# Patient Record
Sex: FEMALE | Race: WHITE | NOT HISPANIC OR LATINO | Employment: OTHER | URBAN - METROPOLITAN AREA
[De-identification: names, ages, dates, MRNs, and addresses within clinical notes are randomized per-mention and may not be internally consistent; named-entity substitution may affect disease eponyms.]

---

## 2019-08-01 ENCOUNTER — PATIENT OUTREACH (OUTPATIENT)
Dept: ADMINISTRATIVE | Facility: HOSPITAL | Age: 79
End: 2019-08-01

## 2019-08-01 NOTE — LETTER
August 1, 2019    Christa Tate  [address]  [address]  [place] [address]             [facility]  [address]  [facility] [address]  Phone: [phone] Dear Mrs. Tate:    [facility] is committed to your overall health.  To help you get the most out of each of your visits, we will review your information to make sure you are up to date on all of your recommended tests and/or procedures.      Primary Doctor No  has found that your chart shows you may be due for the following:    Health Maintenance Due   Topic    DEXA SCAN     Pneumococcal Vaccine (65+ Low/Medium Risk) (1 of 2 - PCV13)       If you have had any of the above done at another facility, please bring the records with you or FAX them to [phone] so that your record at [facility] will be complete.  If you have not had any of these tests or procedures done recently and would like to complete this testing, please call [phone] or send a message through your [facility] portal to your provider's office.    If you have an upcoming scheduled appointment for the above test and/or procedures, please disregard this letter.      If you have any questions or concerns, please don't hesitate to call.    Sincerely,        Radha Palacios MA

## 2019-08-30 ENCOUNTER — HOSPITAL ENCOUNTER (OUTPATIENT)
Dept: RADIOLOGY | Facility: HOSPITAL | Age: 79
Discharge: HOME | End: 2019-08-30
Attending: FAMILY MEDICINE
Payer: MEDICARE

## 2019-08-30 ENCOUNTER — OFFICE VISIT (OUTPATIENT)
Dept: FAMILY MEDICINE | Facility: CLINIC | Age: 79
End: 2019-08-30
Payer: MEDICARE

## 2019-08-30 VITALS
TEMPERATURE: 98 F | OXYGEN SATURATION: 97 % | SYSTOLIC BLOOD PRESSURE: 120 MMHG | HEART RATE: 65 BPM | BODY MASS INDEX: 30.69 KG/M2 | WEIGHT: 173.19 LBS | HEIGHT: 63 IN | DIASTOLIC BLOOD PRESSURE: 70 MMHG

## 2019-08-30 DIAGNOSIS — M54.50 LOW BACK PAIN, NON-SPECIFIC: ICD-10-CM

## 2019-08-30 DIAGNOSIS — R30.0 DYSURIA: Primary | ICD-10-CM

## 2019-08-30 DIAGNOSIS — J32.1 CHRONIC FRONTAL SINUSITIS: ICD-10-CM

## 2019-08-30 DIAGNOSIS — N30.00 ACUTE CYSTITIS WITHOUT HEMATURIA: ICD-10-CM

## 2019-08-30 DIAGNOSIS — K59.09 CHRONIC CONSTIPATION: ICD-10-CM

## 2019-08-30 LAB
BILIRUB SERPL-MCNC: NEGATIVE MG/DL
BLOOD URINE, POC: NEGATIVE
COLOR, POC UA: ABNORMAL
GLUCOSE UR QL STRIP: NORMAL
KETONES UR QL STRIP: NEGATIVE
LEUKOCYTE ESTERASE URINE, POC: ABNORMAL
NITRITE, POC UA: NEGATIVE
PH, POC UA: 7
PROTEIN, POC: NEGATIVE
SPECIFIC GRAVITY, POC UA: 1
UROBILINOGEN, POC UA: NORMAL

## 2019-08-30 PROCEDURE — 99999 PR PBB SHADOW E&M-EST. PATIENT-LVL III: CPT | Mod: PBBFAC,,, | Performed by: FAMILY MEDICINE

## 2019-08-30 PROCEDURE — 72110 XR LUMBAR SPINE COMPLETE 5 VIEW: ICD-10-PCS | Mod: 26,,, | Performed by: RADIOLOGY

## 2019-08-30 PROCEDURE — 99999 PR PBB SHADOW E&M-EST. PATIENT-LVL III: ICD-10-PCS | Mod: PBBFAC,,, | Performed by: FAMILY MEDICINE

## 2019-08-30 PROCEDURE — 99204 PR OFFICE/OUTPT VISIT, NEW, LEVL IV, 45-59 MIN: ICD-10-PCS | Mod: S$PBB,,, | Performed by: FAMILY MEDICINE

## 2019-08-30 PROCEDURE — 81002 URINALYSIS NONAUTO W/O SCOPE: CPT | Mod: PBBFAC,PN | Performed by: FAMILY MEDICINE

## 2019-08-30 PROCEDURE — 72110 X-RAY EXAM L-2 SPINE 4/>VWS: CPT | Mod: TC,PN

## 2019-08-30 PROCEDURE — 99213 OFFICE O/P EST LOW 20 MIN: CPT | Mod: PBBFAC,25,PN | Performed by: FAMILY MEDICINE

## 2019-08-30 PROCEDURE — 99204 OFFICE O/P NEW MOD 45 MIN: CPT | Mod: S$PBB,,, | Performed by: FAMILY MEDICINE

## 2019-08-30 PROCEDURE — 72110 X-RAY EXAM L-2 SPINE 4/>VWS: CPT | Mod: 26,,, | Performed by: RADIOLOGY

## 2019-08-30 RX ORDER — FLUTICASONE PROPIONATE 50 MCG
2 SPRAY, SUSPENSION (ML) NASAL DAILY
Qty: 1 BOTTLE | Refills: 5 | Status: SHIPPED | OUTPATIENT
Start: 2019-08-30 | End: 2020-07-09

## 2019-08-30 RX ORDER — CEPHALEXIN 500 MG/1
500 CAPSULE ORAL EVERY 12 HOURS
Qty: 14 CAPSULE | Refills: 0 | Status: SHIPPED | OUTPATIENT
Start: 2019-08-30 | End: 2020-03-06

## 2019-08-30 RX ORDER — LUBIPROSTONE 24 UG/1
24 CAPSULE ORAL 2 TIMES DAILY WITH MEALS
Qty: 60 CAPSULE | Refills: 11 | Status: SHIPPED | OUTPATIENT
Start: 2019-08-30 | End: 2020-05-26

## 2019-08-30 RX ORDER — LATANOPROST 50 UG/ML
SOLUTION/ DROPS OPHTHALMIC
Refills: 6 | COMMUNITY
Start: 2019-07-31 | End: 2020-10-22

## 2019-08-30 NOTE — PROGRESS NOTES
Assessment and Plan:    1. Dysuria  - POCT urine dipstick without microscope  - Urine culture; Future    2. Low back pain, non-specific  - X-Ray Lumbar Spine Complete 5 View; Future    3. Chronic constipation  - lubiprostone (AMITIZA) 24 MCG Cap; Take 1 capsule (24 mcg total) by mouth 2 (two) times daily with meals.  Dispense: 60 capsule; Refill: 11    4. Acute cystitis without hematuria  - cephALEXin (KEFLEX) 500 MG capsule; Take 1 capsule (500 mg total) by mouth every 12 (twelve) hours.  Dispense: 14 capsule; Refill: 0    5. Chronic frontal sinusitis  - fluticasone propionate (FLONASE) 50 mcg/actuation nasal spray; 2 sprays (100 mcg total) by Each Nostril route once daily.  Dispense: 1 Bottle; Refill: 5          ______________________________________________________________________  Subjective:    Chief Complaint:  Chief Complaint   Patient presents with    Establish Care    Urinary Tract Infection     ocurring x2 weeks, patient is having lower back pain on both sides, worse on the left side, and pain/burning upon urination        HPI:  Christa is a 79 y.o. year old     Lower back pain  Associated with lower urinary tract symptoms including dysuria.  Denies fever, nausea, diarrhea. Reports having had fall about two weeks ago and that her lower back has been hurting since then. Was seen in ED, lower back not imaged.     Chronic Sinusitis  Has post nasal drips and sinus HA. Not taking medication at this time for symptoms.     Chronic Constipation   Has tried OTC medicaiton including senna and miralax w/o relief.     Medications:  Current Outpatient Medications on File Prior to Visit   Medication Sig Dispense Refill    alprazolam (XANAX) 0.5 MG tablet Take 1 tablet (0.5 mg total) by mouth 2 (two) times daily as needed for Anxiety. 60 tablet 0    latanoprost 0.005 % ophthalmic solution INT 1 GTT INTO OU ONCE A DAY  6     No current facility-administered medications on file prior to visit.        Review of  "Systems:  Review of Systems   Constitutional: Negative for fever.   Respiratory: Negative for cough and shortness of breath.    Cardiovascular: Negative for chest pain.   Gastrointestinal: Positive for constipation. Negative for abdominal pain, diarrhea, nausea and vomiting.   Genitourinary: Positive for dysuria.   Musculoskeletal: Positive for back pain.   Skin: Negative for rash.   Psychiatric/Behavioral: Negative for dysphoric mood.       Past Medical History:  Past Medical History:   Diagnosis Date    Cataract     OU       Objective:    Vitals:  Vitals:    08/30/19 0901   BP: 120/70   Pulse: 65   Temp: 98.4 °F (36.9 °C)   TempSrc: Oral   SpO2: 97%   Weight: 78.5 kg (173 lb 2.7 oz)   Height: 5' 3" (1.6 m)   PainSc:   7   PainLoc: Back       Physical Exam   Constitutional: No distress.   HENT:   Head: Normocephalic and atraumatic.   Nose: Mucosal edema and rhinorrhea present.   Eyes: Pupils are equal, round, and reactive to light. EOM are normal.   Neck: Neck supple.   Cardiovascular: Normal rate and regular rhythm. Exam reveals no friction rub.   No murmur heard.  Pulmonary/Chest: Effort normal and breath sounds normal.   Abdominal: Soft. Bowel sounds are normal. She exhibits no distension. There is no tenderness. There is no CVA tenderness.   Musculoskeletal:        Back:    Skin: Skin is warm and dry. No rash noted.   Psychiatric: She has a normal mood and affect. Her behavior is normal.           Jonah Stone MD  Family Medicine  "

## 2019-09-03 ENCOUNTER — TELEPHONE (OUTPATIENT)
Dept: FAMILY MEDICINE | Facility: CLINIC | Age: 79
End: 2019-09-03

## 2019-09-03 NOTE — TELEPHONE ENCOUNTER
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 The fall likely aggravated that. .   Dr Stone

## 2019-09-13 ENCOUNTER — PATIENT OUTREACH (OUTPATIENT)
Dept: ADMINISTRATIVE | Facility: HOSPITAL | Age: 79
End: 2019-09-13

## 2019-09-13 DIAGNOSIS — M94.9 DISORDER OF BONE AND ARTICULAR CARTILAGE: Primary | ICD-10-CM

## 2019-09-13 DIAGNOSIS — M89.9 DISORDER OF BONE AND ARTICULAR CARTILAGE: Primary | ICD-10-CM

## 2019-10-28 ENCOUNTER — TELEPHONE (OUTPATIENT)
Dept: OPTOMETRY | Facility: CLINIC | Age: 79
End: 2019-10-28

## 2019-10-28 NOTE — TELEPHONE ENCOUNTER
Pt scheduled with Dr. Colón for 2nd opinion on cataracts.  If pt has already seen an outside optometrist, then appointment should be rescheduled to see Dr. Klein instead of Dr. Colón.   L/M for pt to call back to discuss options / appointment.

## 2020-03-06 ENCOUNTER — OFFICE VISIT (OUTPATIENT)
Dept: FAMILY MEDICINE | Facility: CLINIC | Age: 80
End: 2020-03-06
Payer: MEDICARE

## 2020-03-06 VITALS
OXYGEN SATURATION: 97 % | WEIGHT: 173.63 LBS | BODY MASS INDEX: 30.77 KG/M2 | SYSTOLIC BLOOD PRESSURE: 138 MMHG | HEART RATE: 99 BPM | TEMPERATURE: 99 F | DIASTOLIC BLOOD PRESSURE: 74 MMHG | HEIGHT: 63 IN

## 2020-03-06 DIAGNOSIS — R50.9 LOW GRADE FEVER: ICD-10-CM

## 2020-03-06 DIAGNOSIS — B96.89 ACUTE BACTERIAL BRONCHITIS: ICD-10-CM

## 2020-03-06 DIAGNOSIS — B96.89 ACUTE BACTERIAL SINUSITIS: ICD-10-CM

## 2020-03-06 DIAGNOSIS — G93.31 POST-INFLUENZA SYNDROME: ICD-10-CM

## 2020-03-06 DIAGNOSIS — B96.89 ACUTE BACTERIAL PHARYNGITIS: Primary | ICD-10-CM

## 2020-03-06 DIAGNOSIS — Z20.828 EXPOSURE TO INFLUENZA: ICD-10-CM

## 2020-03-06 DIAGNOSIS — J30.2 SEASONAL ALLERGIES: ICD-10-CM

## 2020-03-06 DIAGNOSIS — J20.8 ACUTE BACTERIAL BRONCHITIS: ICD-10-CM

## 2020-03-06 DIAGNOSIS — J02.8 ACUTE BACTERIAL PHARYNGITIS: Primary | ICD-10-CM

## 2020-03-06 DIAGNOSIS — J01.90 ACUTE BACTERIAL SINUSITIS: ICD-10-CM

## 2020-03-06 DIAGNOSIS — J98.01 BRONCHOSPASM: ICD-10-CM

## 2020-03-06 LAB
CTP QC/QA: YES
CTP QC/QA: YES
POC MOLECULAR INFLUENZA A AGN: NEGATIVE
POC MOLECULAR INFLUENZA B AGN: NEGATIVE
S PYO RRNA THROAT QL PROBE: NEGATIVE

## 2020-03-06 PROCEDURE — 99214 PR OFFICE/OUTPT VISIT, EST, LEVL IV, 30-39 MIN: ICD-10-PCS | Mod: S$PBB,,, | Performed by: INTERNAL MEDICINE

## 2020-03-06 PROCEDURE — 99999 PR PBB SHADOW E&M-EST. PATIENT-LVL III: CPT | Mod: PBBFAC,,, | Performed by: INTERNAL MEDICINE

## 2020-03-06 PROCEDURE — 99213 OFFICE O/P EST LOW 20 MIN: CPT | Mod: PBBFAC,PN | Performed by: INTERNAL MEDICINE

## 2020-03-06 PROCEDURE — 87081 CULTURE SCREEN ONLY: CPT

## 2020-03-06 PROCEDURE — 87502 INFLUENZA DNA AMP PROBE: CPT | Mod: PBBFAC,PN | Performed by: INTERNAL MEDICINE

## 2020-03-06 PROCEDURE — 96372 THER/PROPH/DIAG INJ SC/IM: CPT | Mod: PBBFAC,PN

## 2020-03-06 PROCEDURE — 87880 STREP A ASSAY W/OPTIC: CPT | Mod: PBBFAC,PN | Performed by: INTERNAL MEDICINE

## 2020-03-06 PROCEDURE — 99999 PR PBB SHADOW E&M-EST. PATIENT-LVL III: ICD-10-PCS | Mod: PBBFAC,,, | Performed by: INTERNAL MEDICINE

## 2020-03-06 PROCEDURE — 99214 OFFICE O/P EST MOD 30 MIN: CPT | Mod: S$PBB,,, | Performed by: INTERNAL MEDICINE

## 2020-03-06 RX ORDER — ALBUTEROL SULFATE 90 UG/1
2 AEROSOL, METERED RESPIRATORY (INHALATION) EVERY 6 HOURS PRN
Qty: 18 G | Refills: 1 | Status: SHIPPED | OUTPATIENT
Start: 2020-03-06 | End: 2020-04-22

## 2020-03-06 RX ORDER — BETAMETHASONE SODIUM PHOSPHATE AND BETAMETHASONE ACETATE 3; 3 MG/ML; MG/ML
6 INJECTION, SUSPENSION INTRA-ARTICULAR; INTRALESIONAL; INTRAMUSCULAR; SOFT TISSUE ONCE
Status: COMPLETED | OUTPATIENT
Start: 2020-03-06 | End: 2020-03-06

## 2020-03-06 RX ORDER — AZITHROMYCIN 250 MG/1
TABLET, FILM COATED ORAL
Qty: 6 TABLET | Refills: 0 | Status: SHIPPED | OUTPATIENT
Start: 2020-03-06 | End: 2020-05-26 | Stop reason: ALTCHOICE

## 2020-03-06 RX ADMIN — BETAMETHASONE ACETATE AND BETAMETHASONE SODIUM PHOSPHATE 6 MG: 3; 3 INJECTION, SUSPENSION INTRA-ARTICULAR; INTRALESIONAL; INTRAMUSCULAR; SOFT TISSUE at 12:03

## 2020-03-06 NOTE — PATIENT INSTRUCTIONS
Acute bacterial pharyngitis; chloraseptic spray for sore throat. Warm salt water gargle as needed.   -     POCT Influenza A/B Molecular  -     POCT Rapid Strep A  -     azithromycin (ZITHROMAX Z-JOSHUA) 250 MG tablet; 2 po today then tomorrow begin one a day for 4 additional days; to complete a 5 day course.  Dispense: 6 tablet; Refill: 0    Acute bacterial sinusitis; zyrtec 10 mg a day for congestion; flonase 1-2 sprays a day for congestion. Warm salt water gargle as needed.   Betamethasone acetate/phosphate 6 mg IM x1 dose in office.   -     POCT Influenza A/B Molecular  -     POCT Rapid Strep A  -     azithromycin (ZITHROMAX Z-JOSHUA) 250 MG tablet; 2 po today then tomorrow begin one a day for 4 additional days; to complete a 5 day course.  Dispense: 6 tablet; Refill: 0    Acute bacterial bronchitis; zyrtec 10 mg a day for congestion; flonase 1-2 sprays a day for congestion. Mucinex DM for cough and congestion  Betamethasone acetate/phosphate 6 mg IM x1 dose in office.   -     POCT Influenza A/B Molecular  -     POCT Rapid Strep A  -     azithromycin (ZITHROMAX Z-JOSHUA) 250 MG tablet; 2 po today then tomorrow begin one a day for 4 additional days; to complete a 5 day course.  Dispense: 6 tablet; Refill: 0    Bronchospasm; Betamethasone acetate/phosphate 6 mg IM x1 dose in office. Ventolin 2 puffs every 6 hrs as needed for wheezing.     Low grade fever; tylenol for fever.     Post-influenza syndrome; Betamethasone acetate/phosphate 6 mg IM x1 dose in office.  Rest w plenty of fluids.   -     POCT Influenza A/B Molecular  -     POCT Rapid Strep A    Exposure to influenza    Seasonal allergies; zyrtec 10 mg a day for congestion; flonase 1-2 sprays a day for congestion.   Betamethasone acetate/phosphate 6 mg IM x1 dose in office.

## 2020-03-06 NOTE — PROGRESS NOTES
Subjective:       Patient ID: Christa Tate is a 79 y.o. female.    Chief Complaint: Fever; Cough; Chills; Sore Throat; ear pops, left; Nasal Congestion; Fatigue; and exposed to flu 2/3 weeks ago    HPI  Seeing patient today for her PCP Dr. Jonah Stone  Upper respiratory symptoms:  Patient complains of fever that was 2 weeks ago that lasted for about 1 day and chills at that time which lasted a few days.  Both physical cleared.  Cough more than 2 weeks ago persistent was initially with colored phlegm for a few days now clear mucus.  Slight nausea 2 weeks ago has cleared.  No abdominal pain noted. Flu exposure around 2-3 weeks ago with patient's daughter.  Sore throat 2 and half weeks ago an ongoing which has been progressing lately.  Patient also with nasal congestion chronically but has been progressive lately.  Frontal headaches and facial congestion or noted. Nasal phlegm now clear roughly 2 weeks ago for 1 day was yellow- green in color.  Some audible wheezing which is intermittent for the last few years has had some intermittent the last few weeks.  Tell since syrup helps her cough.  Low-grade fever here in the office 99.4.    Review of Systems   Constitutional: Positive for fatigue. Negative for appetite change, fever and unexpected weight change.   HENT: Positive for congestion and sore throat. Negative for postnasal drip, rhinorrhea and sneezing.    Respiratory: Positive for cough and wheezing. Negative for chest tightness and shortness of breath.    Cardiovascular: Negative for chest pain, palpitations and leg swelling.   Gastrointestinal: Negative for abdominal pain, blood in stool, constipation, diarrhea, nausea and vomiting.   Genitourinary: Negative for dysuria and hematuria.   Musculoskeletal: Negative for arthralgias and myalgias.   Skin: Negative for rash.   Neurological: Positive for headaches.   Psychiatric/Behavioral: Negative for dysphoric mood. The patient is not nervous/anxious.       "  Objective:      Vitals:    03/06/20 1058   BP: 138/74   BP Location: Right arm   Patient Position: Sitting   BP Method: Medium (Manual)   Pulse: 99   Temp: 99.4 °F (37.4 °C)   TempSrc: Oral   SpO2: 97%   Weight: 78.8 kg (173 lb 9.8 oz)   Height: 5' 3" (1.6 m)     Body mass index is 30.75 kg/m².  Wt Readings from Last 3 Encounters:   03/06/20 78.8 kg (173 lb 9.8 oz)   08/30/19 78.5 kg (173 lb 2.7 oz)   08/15/19 79.4 kg (175 lb 0.7 oz)        Physical Exam   Constitutional: She is oriented to person, place, and time. She appears well-developed and well-nourished.   HENT:   Head: Normocephalic and atraumatic.   Eyes: EOM are normal.   Neck: Normal range of motion. Neck supple. No thyromegaly present.   Right TM w wax obstructing. L TM pink w partial debris wax.  Throat inflamed. NM swollen slightly inflamed w clear to yel-white mucus. Sinus tenderness to palp worse right frontal.    Cardiovascular: Normal rate, regular rhythm and normal heart sounds. Exam reveals no gallop.   No murmur heard.  Pulmonary/Chest: Effort normal and breath sounds normal. No respiratory distress. She has no wheezes. She has no rales.   CTA; post-tussive wheeze w decrease BS's.    Abdominal: Soft. Bowel sounds are normal. She exhibits no distension. There is no tenderness. There is no rebound and no guarding.   Musculoskeletal: Normal range of motion. She exhibits no edema.   Lymphadenopathy:     She has no cervical adenopathy.   Neurological: She is alert and oriented to person, place, and time.   Moves all 4 extremities fine.   Skin: No rash noted.   Psychiatric: She has a normal mood and affect. Her behavior is normal. Thought content normal.   Vitals reviewed.      Assessment:       1. Acute bacterial pharyngitis    2. Acute bacterial sinusitis    3. Acute bacterial bronchitis    4. Bronchospasm    5. Low grade fever    6. Post-influenza syndrome    7. Exposure to influenza    8. Seasonal allergies        Plan:       Acute bacterial " pharyngitis; chloraseptic spray for sore throat. Warm salt water gargle as needed.   -     POCT Influenza A/B Molecular; neg for both  -     POCT Rapid Strep A; neg. Will obtain throat cult for Strep A.  -     azithromycin (ZITHROMAX Z-JOSHUA) 250 MG tablet; 2 po today then tomorrow begin one a day for 4 additional days; to complete a 5 day course.  Dispense: 6 tablet; Refill: 0    Acute bacterial sinusitis; zyrtec 10 mg a day for congestion; flonase 1-2 sprays a day for congestion. Warm salt water gargle as needed.   Betamethasone acetate/phosphate 6 mg IM x1 dose in office.   -     POCT Influenza A/B Molecular  -     POCT Rapid Strep A  -     azithromycin (ZITHROMAX Z-JOSHUA) 250 MG tablet; 2 po today then tomorrow begin one a day for 4 additional days; to complete a 5 day course.  Dispense: 6 tablet; Refill: 0    Acute bacterial bronchitis; zyrtec 10 mg a day for congestion; flonase 1-2 sprays a day for congestion. Mucinex DM for cough and congestion  Betamethasone acetate/phosphate 6 mg IM x1 dose in office.   -     POCT Influenza A/B Molecular  -     POCT Rapid Strep A  -     azithromycin (ZITHROMAX Z-JOSHUA) 250 MG tablet; 2 po today then tomorrow begin one a day for 4 additional days; to complete a 5 day course.  Dispense: 6 tablet; Refill: 0    Bronchospasm; Betamethasone acetate/phosphate 6 mg IM x1 dose in office. Ventolin 2 puffs every 6 hrs as needed for wheezing.     Low grade fever; tylenol for fever.     Post-influenza syndrome; Betamethasone acetate/phosphate 6 mg IM x1 dose in office.  Rest w plenty of fluids.   -     POCT Influenza A/B Molecular  -     POCT Rapid Strep A    Exposure to influenza    Seasonal allergies; zyrtec 10 mg a day for congestion; flonase 1-2 sprays a day for congestion.   Betamethasone acetate/phosphate 6 mg IM x1 dose in office.

## 2020-03-08 LAB — BACTERIA THROAT CULT: NORMAL

## 2020-03-11 ENCOUNTER — TELEPHONE (OUTPATIENT)
Dept: FAMILY MEDICINE | Facility: CLINIC | Age: 80
End: 2020-03-11

## 2020-03-11 NOTE — TELEPHONE ENCOUNTER
"Called patient in regards to doing the travel screen with her. Patient was asked the appropriate questions. Patient does not want to do a phone visit. Patient stated "I am actually feeling much better since he gave me the zpak but I can still feel it lingering." I voiced understanding.   "

## 2020-03-20 NOTE — TELEPHONE ENCOUNTER
"Called and spoke with patient in regards to scheduing her appointment out 30 days or to changed to a virtual MyChart visit due to the COVID-19 concerns. Patient wants to do the virtual visit, but does not have the patient portal set up. Patient stated "I am going to have my daughter set it up for me and when that's done we will give you a call to arrange the visit." I voiced understanding.   "

## 2020-04-21 ENCOUNTER — TELEPHONE (OUTPATIENT)
Dept: FAMILY MEDICINE | Facility: CLINIC | Age: 80
End: 2020-04-21

## 2020-04-21 DIAGNOSIS — J98.01 BRONCHOSPASM: ICD-10-CM

## 2020-04-21 NOTE — TELEPHONE ENCOUNTER
Spoke with pt about setting up a virtual visit. Pt states that she is going to download the anil and then going to call back to set up appt.  Will keep same date and time of original appt.  Offer audio or virtual visit when she calls back.

## 2020-04-22 RX ORDER — ALBUTEROL SULFATE 90 UG/1
AEROSOL, METERED RESPIRATORY (INHALATION)
Qty: 18 G | Refills: 1 | Status: SHIPPED | OUTPATIENT
Start: 2020-04-22 | End: 2021-04-14

## 2020-05-26 ENCOUNTER — OFFICE VISIT (OUTPATIENT)
Dept: FAMILY MEDICINE | Facility: CLINIC | Age: 80
End: 2020-05-26
Payer: MEDICARE

## 2020-05-26 ENCOUNTER — LAB VISIT (OUTPATIENT)
Dept: LAB | Facility: HOSPITAL | Age: 80
End: 2020-05-26
Attending: FAMILY MEDICINE
Payer: MEDICARE

## 2020-05-26 VITALS
HEART RATE: 88 BPM | WEIGHT: 175.25 LBS | OXYGEN SATURATION: 97 % | BODY MASS INDEX: 31.05 KG/M2 | SYSTOLIC BLOOD PRESSURE: 118 MMHG | TEMPERATURE: 98 F | HEIGHT: 63 IN | DIASTOLIC BLOOD PRESSURE: 70 MMHG

## 2020-05-26 DIAGNOSIS — Z00.00 GENERAL MEDICAL EXAM: Primary | ICD-10-CM

## 2020-05-26 DIAGNOSIS — Z78.0 POSTMENOPAUSAL: ICD-10-CM

## 2020-05-26 DIAGNOSIS — Z11.59 SCREENING FOR VIRAL DISEASE: ICD-10-CM

## 2020-05-26 DIAGNOSIS — M79.641 RIGHT HAND PAIN: ICD-10-CM

## 2020-05-26 DIAGNOSIS — K59.09 CHRONIC CONSTIPATION: ICD-10-CM

## 2020-05-26 DIAGNOSIS — Z13.21 ENCOUNTER FOR VITAMIN DEFICIENCY SCREENING: ICD-10-CM

## 2020-05-26 DIAGNOSIS — Z00.00 GENERAL MEDICAL EXAM: ICD-10-CM

## 2020-05-26 LAB
ALBUMIN SERPL BCP-MCNC: 4 G/DL (ref 3.5–5.2)
ALP SERPL-CCNC: 80 U/L (ref 55–135)
ALT SERPL W/O P-5'-P-CCNC: 9 U/L (ref 10–44)
ANION GAP SERPL CALC-SCNC: 8 MMOL/L (ref 8–16)
AST SERPL-CCNC: 22 U/L (ref 10–40)
BASOPHILS # BLD AUTO: 0.04 K/UL (ref 0–0.2)
BASOPHILS NFR BLD: 0.6 % (ref 0–1.9)
BILIRUB SERPL-MCNC: 0.4 MG/DL (ref 0.1–1)
BUN SERPL-MCNC: 12 MG/DL (ref 8–23)
CALCIUM SERPL-MCNC: 9.7 MG/DL (ref 8.7–10.5)
CHLORIDE SERPL-SCNC: 101 MMOL/L (ref 95–110)
CO2 SERPL-SCNC: 29 MMOL/L (ref 23–29)
CREAT SERPL-MCNC: 0.7 MG/DL (ref 0.5–1.4)
DIFFERENTIAL METHOD: ABNORMAL
EOSINOPHIL # BLD AUTO: 0.2 K/UL (ref 0–0.5)
EOSINOPHIL NFR BLD: 3.2 % (ref 0–8)
ERYTHROCYTE [DISTWIDTH] IN BLOOD BY AUTOMATED COUNT: 14.7 % (ref 11.5–14.5)
EST. GFR  (AFRICAN AMERICAN): >60 ML/MIN/1.73 M^2
EST. GFR  (NON AFRICAN AMERICAN): >60 ML/MIN/1.73 M^2
GLUCOSE SERPL-MCNC: 134 MG/DL (ref 70–110)
HCT VFR BLD AUTO: 41.4 % (ref 37–48.5)
HGB BLD-MCNC: 13 G/DL (ref 12–16)
IMM GRANULOCYTES # BLD AUTO: 0.01 K/UL (ref 0–0.04)
IMM GRANULOCYTES NFR BLD AUTO: 0.1 % (ref 0–0.5)
LYMPHOCYTES # BLD AUTO: 2.6 K/UL (ref 1–4.8)
LYMPHOCYTES NFR BLD: 37.9 % (ref 18–48)
MCH RBC QN AUTO: 27.8 PG (ref 27–31)
MCHC RBC AUTO-ENTMCNC: 31.4 G/DL (ref 32–36)
MCV RBC AUTO: 89 FL (ref 82–98)
MONOCYTES # BLD AUTO: 0.4 K/UL (ref 0.3–1)
MONOCYTES NFR BLD: 5.6 % (ref 4–15)
NEUTROPHILS # BLD AUTO: 3.6 K/UL (ref 1.8–7.7)
NEUTROPHILS NFR BLD: 52.6 % (ref 38–73)
NRBC BLD-RTO: 0 /100 WBC
PLATELET # BLD AUTO: 306 K/UL (ref 150–350)
PMV BLD AUTO: 11.6 FL (ref 9.2–12.9)
POTASSIUM SERPL-SCNC: 3.7 MMOL/L (ref 3.5–5.1)
PROT SERPL-MCNC: 7.8 G/DL (ref 6–8.4)
RBC # BLD AUTO: 4.68 M/UL (ref 4–5.4)
SARS-COV-2 IGG SERPLBLD QL IA.RAPID: NEGATIVE
SODIUM SERPL-SCNC: 138 MMOL/L (ref 136–145)
TSH SERPL DL<=0.005 MIU/L-ACNC: 1.73 UIU/ML (ref 0.4–4)
WBC # BLD AUTO: 6.81 K/UL (ref 3.9–12.7)

## 2020-05-26 PROCEDURE — 99999 PR PBB SHADOW E&M-EST. PATIENT-LVL IV: CPT | Mod: PBBFAC,,, | Performed by: FAMILY MEDICINE

## 2020-05-26 PROCEDURE — 99214 OFFICE O/P EST MOD 30 MIN: CPT | Mod: S$PBB,,, | Performed by: FAMILY MEDICINE

## 2020-05-26 PROCEDURE — 84443 ASSAY THYROID STIM HORMONE: CPT

## 2020-05-26 PROCEDURE — 99214 PR OFFICE/OUTPT VISIT, EST, LEVL IV, 30-39 MIN: ICD-10-PCS | Mod: S$PBB,,, | Performed by: FAMILY MEDICINE

## 2020-05-26 PROCEDURE — 99999 PR PBB SHADOW E&M-EST. PATIENT-LVL IV: ICD-10-PCS | Mod: PBBFAC,,, | Performed by: FAMILY MEDICINE

## 2020-05-26 PROCEDURE — 36415 COLL VENOUS BLD VENIPUNCTURE: CPT | Mod: PN

## 2020-05-26 PROCEDURE — 86769 SARS-COV-2 COVID-19 ANTIBODY: CPT

## 2020-05-26 PROCEDURE — 99214 OFFICE O/P EST MOD 30 MIN: CPT | Mod: PBBFAC,PN | Performed by: FAMILY MEDICINE

## 2020-05-26 PROCEDURE — 82306 VITAMIN D 25 HYDROXY: CPT

## 2020-05-26 PROCEDURE — 80053 COMPREHEN METABOLIC PANEL: CPT

## 2020-05-26 PROCEDURE — 85025 COMPLETE CBC W/AUTO DIFF WBC: CPT

## 2020-05-26 NOTE — PROGRESS NOTES
THIS DOCUMENT WAS MADE IN PART WITH VOICE RECOGNITION SOFTWARE.  OCCASIONALLY THIS SOFTWARE WILL MISINTERPRET WORDS OR PHRASES.    Assessment and Plan:    1. General medical exam  - CBC auto differential; Future  - Comprehensive metabolic panel; Future    2. Chronic constipation  - CBC auto differential; Future  - TSH; Future    3. Screening for viral disease  - COVID-19 (SARS CoV-2) IgG Antibody; Future    4. Right hand pain  Multiple trigger fingers appreciated, osteoarthritis likely, referral to hand specialist for further evaluation  - Ambulatory referral/consult to Orthopedics; Future    5. Postmenopause  - DXA Bone Density Spine And Hip; Future    6. Encounter for vitamin deficiency screening  - Vitamin D; Future    7. Body mass index (bmi) 31.0-31.9, adult   - Vitamin D; Future        ______________________________________________________________________  Subjective:    Chief Complaint:  Chief Complaint   Patient presents with    Back Pain     Pt twisted her back this morning, lower right side     Labs Only     Pt would like to be tested for covid antibody         HPI:  Christa is a 80 y.o. year old     HAND PAIN  Location right hand  Multiple fingers  Duration several months  No inciting event  Reports having bending index finger    Chronic constipation  Rx on Amitiza 24 mcg twice daily at previous visit, never took  Taking prune juice and MiraLax which works well  Denies any melena or hematochezia    Chronic sinusitis  Rx Flonase, only taking once or twice a week along with Children's Zyrtec  Reports runny nose nasal congestion sneezing.    Health maintenance  Pneumonia vaccine, shingles vaccine, DEXA scan  \defers pneumonia vaccine today    Requesting COVID serology test, asymptomatic currently but did have prolonged course of respiratory illness a few months ago    Past Medical History:  Past Medical History:   Diagnosis Date    Cataract     OD       Past Surgical History:  Past Surgical History:    Procedure Laterality Date    CATARACT EXTRACTION Left        Family History:  History reviewed. No pertinent family history.    Social History:  Social History     Socioeconomic History    Marital status:      Spouse name: Not on file    Number of children: Not on file    Years of education: Not on file    Highest education level: Not on file   Occupational History    Not on file   Social Needs    Financial resource strain: Not on file    Food insecurity:     Worry: Not on file     Inability: Not on file    Transportation needs:     Medical: Not on file     Non-medical: Not on file   Tobacco Use    Smoking status: Former Smoker     Last attempt to quit: 2004     Years since quittin.4   Substance and Sexual Activity    Alcohol use: Not on file    Drug use: Not on file    Sexual activity: Not on file   Lifestyle    Physical activity:     Days per week: Not on file     Minutes per session: Not on file    Stress: Not on file   Relationships    Social connections:     Talks on phone: Not on file     Gets together: Not on file     Attends [employer] service: Not on file     Active member of club or organization: Not on file     Attends meetings of clubs or organizations: Not on file     Relationship status: Not on file   Other Topics Concern    Not on file   Social History Narrative    Not on file       Medications:  Current Outpatient Medications on File Prior to Visit   Medication Sig Dispense Refill    albuterol (PROVENTIL/VENTOLIN HFA) 90 mcg/actuation inhaler UNHALE TWO PUFFS BY MOUTH EVERY 6 HOURS AS NEEDED FOR WHEEZING 18 g 1    fluticasone propionate (FLONASE) 50 mcg/actuation nasal spray 2 sprays (100 mcg total) by Each Nostril route once daily. 1 Bottle 5    latanoprost 0.005 % ophthalmic solution INT 1 GTT INTO OU ONCE A DAY  6    [DISCONTINUED] azithromycin (ZITHROMAX Z-JOSHUA) 250 MG tablet 2 po today then tomorrow begin one a day for 4 additional days; to complete a 5 day  "course. 6 tablet 0    alprazolam (XANAX) 0.5 MG tablet Take 1 tablet (0.5 mg total) by mouth 2 (two) times daily as needed for Anxiety. 60 tablet 0    [DISCONTINUED] lubiprostone (AMITIZA) 24 MCG Cap Take 1 capsule (24 mcg total) by mouth 2 (two) times daily with meals. (Patient not taking: Reported on 5/26/2020) 60 capsule 11     No current facility-administered medications on file prior to visit.        Allergies:  Metal staples [surgical stainless steel] and Penicillins    Immunizations:  Immunization History   Administered Date(s) Administered    Tdap 05/15/2019       Review of Systems:  Review of Systems   Musculoskeletal: Positive for back pain.   All other systems reviewed and are negative.      Objective:    Vitals:  Vitals:    05/26/20 1144   Pulse: 88   Temp: 98.1 °F (36.7 °C)   TempSrc: Oral   SpO2: 97%   Weight: 79.5 kg (175 lb 4.3 oz)   Height: 5' 3" (1.6 m)   PainSc:   5   PainLoc: Back       Physical Exam   Constitutional: She appears well-developed. No distress.   HENT:   Head: Normocephalic and atraumatic.   Eyes: EOM are normal.   Neck: Normal range of motion.   Pulmonary/Chest: Effort normal. No respiratory distress.   Musculoskeletal:        Arms:  Psychiatric: She has a normal mood and affect. Her behavior is normal. Judgment and thought content normal.   Vitals reviewed.      Data:  No previous labs, imaging, or notes available.        Jonah Stone MD  Family Medicine    "

## 2020-05-27 ENCOUNTER — TELEPHONE (OUTPATIENT)
Dept: FAMILY MEDICINE | Facility: CLINIC | Age: 80
End: 2020-05-27

## 2020-05-27 DIAGNOSIS — E55.9 VITAMIN D INSUFFICIENCY: Primary | ICD-10-CM

## 2020-05-27 LAB — 25(OH)D3+25(OH)D2 SERPL-MCNC: 21 NG/ML (ref 30–96)

## 2020-05-27 RX ORDER — ACETAMINOPHEN 500 MG
5000 TABLET ORAL DAILY
Qty: 90 TABLET | Refills: 3 | Status: SHIPPED | OUTPATIENT
Start: 2020-05-27

## 2020-05-27 NOTE — TELEPHONE ENCOUNTER
----- Message from Silvia Stubbs sent at 5/27/2020 12:59 PM CDT -----  Contact: pt  Name of Who is Calling: pt    What is the request in detail: calling in regards to her test results. Please contact to further discuss and advise      Can the clinic reply by [facility]: no    [phone]

## 2020-07-15 ENCOUNTER — LAB VISIT (OUTPATIENT)
Dept: PRIMARY CARE CLINIC | Facility: OTHER | Age: 80
End: 2020-07-15
Attending: INTERNAL MEDICINE
Payer: MEDICARE

## 2020-07-15 DIAGNOSIS — Z11.59 SPECIAL SCREENING EXAMINATION FOR UNSPECIFIED VIRAL DISEASE: ICD-10-CM

## 2020-07-15 DIAGNOSIS — Z71.89 COMPLEX CARE COORDINATION: ICD-10-CM

## 2020-07-15 PROCEDURE — U0003 INFECTIOUS AGENT DETECTION BY NUCLEIC ACID (DNA OR RNA); SEVERE ACUTE RESPIRATORY SYNDROME CORONAVIRUS 2 (SARS-COV-2) (CORONAVIRUS DISEASE [COVID-19]), AMPLIFIED PROBE TECHNIQUE, MAKING USE OF HIGH THROUGHPUT TECHNOLOGIES AS DESCRIBED BY CMS-2020-01-R: HCPCS

## 2020-07-18 LAB — SARS-COV-2 RNA RESP QL NAA+PROBE: NOT DETECTED

## 2020-09-02 ENCOUNTER — TELEPHONE (OUTPATIENT)
Dept: FAMILY MEDICINE | Facility: CLINIC | Age: 80
End: 2020-09-02

## 2020-09-08 ENCOUNTER — OFFICE VISIT (OUTPATIENT)
Dept: FAMILY MEDICINE | Facility: CLINIC | Age: 80
End: 2020-09-08
Payer: MEDICARE

## 2020-09-08 VITALS
TEMPERATURE: 98 F | BODY MASS INDEX: 30.88 KG/M2 | WEIGHT: 174.25 LBS | DIASTOLIC BLOOD PRESSURE: 70 MMHG | HEART RATE: 70 BPM | SYSTOLIC BLOOD PRESSURE: 122 MMHG | HEIGHT: 63 IN

## 2020-09-08 DIAGNOSIS — R05.8 DRY COUGH: ICD-10-CM

## 2020-09-08 DIAGNOSIS — Z78.0 POSTMENOPAUSAL: ICD-10-CM

## 2020-09-08 DIAGNOSIS — R60.0 BILATERAL LOWER EXTREMITY EDEMA: ICD-10-CM

## 2020-09-08 DIAGNOSIS — Z23 IMMUNIZATION DUE: ICD-10-CM

## 2020-09-08 DIAGNOSIS — K59.09 CHRONIC CONSTIPATION: ICD-10-CM

## 2020-09-08 DIAGNOSIS — Z00.00 GENERAL MEDICAL EXAM: Primary | ICD-10-CM

## 2020-09-08 DIAGNOSIS — J32.9 CHRONIC SINUSITIS, UNSPECIFIED LOCATION: ICD-10-CM

## 2020-09-08 PROCEDURE — 99999 PR PBB SHADOW E&M-EST. PATIENT-LVL IV: CPT | Mod: PBBFAC,,, | Performed by: FAMILY MEDICINE

## 2020-09-08 PROCEDURE — 99999 PR PBB SHADOW E&M-EST. PATIENT-LVL IV: ICD-10-PCS | Mod: PBBFAC,,, | Performed by: FAMILY MEDICINE

## 2020-09-08 PROCEDURE — 99214 OFFICE O/P EST MOD 30 MIN: CPT | Mod: S$PBB,,, | Performed by: FAMILY MEDICINE

## 2020-09-08 PROCEDURE — 99214 OFFICE O/P EST MOD 30 MIN: CPT | Mod: PBBFAC,PN | Performed by: FAMILY MEDICINE

## 2020-09-08 PROCEDURE — 99214 PR OFFICE/OUTPT VISIT, EST, LEVL IV, 30-39 MIN: ICD-10-PCS | Mod: S$PBB,,, | Performed by: FAMILY MEDICINE

## 2020-09-08 RX ORDER — LORATADINE 10 MG/1
10 TABLET ORAL DAILY
Qty: 30 TABLET | Refills: 2 | Status: SHIPPED | OUTPATIENT
Start: 2020-09-08 | End: 2023-08-01

## 2020-09-08 RX ORDER — LUBIPROSTONE 24 UG/1
24 CAPSULE ORAL 2 TIMES DAILY WITH MEALS
Qty: 60 CAPSULE | Refills: 3 | Status: SHIPPED | OUTPATIENT
Start: 2020-09-08 | End: 2020-10-22

## 2020-09-08 NOTE — PROGRESS NOTES
THIS DOCUMENT WAS MADE IN PART WITH VOICE RECOGNITION SOFTWARE.  OCCASIONALLY THIS SOFTWARE WILL MISINTERPRET WORDS OR PHRASES.    Assessment and Plan:    1. General medical exam    2. Immunization due  Update vaccines at next visit    3. Postmenopausal  Due for DEXA scan, will help schedule    4. Chronic sinusitis, unspecified location  Flonase, Claritin    5. Chronic constipation  Continue MiraLax, fiber supplement  - lubiprostone (AMITIZA) 24 MCG Cap; Take 1 capsule (24 mcg total) by mouth 2 (two) times daily with meals.  Dispense: 60 capsule; Refill: 3    6. Bilateral lower extremity edema  Likely dependent edema secondary to vascular insufficiency.  With constellation of shortness of breath, cough, leg swelling will rule out heart failure with echo  - Echo Color Flow Doppler? Yes; Future    7. Dry cough  Likely secondary to allergic rhinitis, rule out heart failure as above  - Echo Color Flow Doppler? Yes; Future  - loratadine (CLARITIN) 10 mg tablet; Take 1 tablet (10 mg total) by mouth once daily.  Dispense: 30 tablet; Refill: 2        ______________________________________________________________________  Subjective:    Chief Complaint:  Chief Complaint   Patient presents with    Follow-up     3 month follow up         HPI:  Christa is a 80 y.o. year old    Foot swelling + SOB with exertion  Bilateral Swelling  Not so bad today, worse at end of day  Tries to limit sodium intake   Having associated dry cough / treating with cough drop   Rhinitis, ear congestion, sneezing as well    Chronic sinusitis  Rx Flonase (non compliant)  Taking child Zyrtec every now and then     Chronic constipation  Rx : miralax   Not improved , BM daily but low volume  Using activia and prune juice daily     Urinary incont.   Sounds like mixed urge / stress   Tries to do Kegel exercises   Just started scheduling bathroom time which helps      Past Medical History:  Past Medical History:   Diagnosis Date    Cataract     OD        Past Surgical History:  Past Surgical History:   Procedure Laterality Date    CATARACT EXTRACTION Left        Family History:  History reviewed. No pertinent family history.    Social History:  Social History     Socioeconomic History    Marital status:      Spouse name: Not on file    Number of children: Not on file    Years of education: Not on file    Highest education level: Not on file   Occupational History    Not on file   Social Needs    Financial resource strain: Not on file    Food insecurity     Worry: Not on file     Inability: Not on file    Transportation needs     Medical: Not on file     Non-medical: Not on file   Tobacco Use    Smoking status: Former Smoker     Quit date: 2004     Years since quittin.6   Substance and Sexual Activity    Alcohol use: Not on file    Drug use: Not on file    Sexual activity: Not on file   Lifestyle    Physical activity     Days per week: Not on file     Minutes per session: Not on file    Stress: Not on file   Relationships    Social connections     Talks on phone: Not on file     Gets together: Not on file     Attends [employer] service: Not on file     Active member of club or organization: Not on file     Attends meetings of clubs or organizations: Not on file     Relationship status: Not on file   Other Topics Concern    Not on file   Social History Narrative    Not on file       Medications:  Current Outpatient Medications on File Prior to Visit   Medication Sig Dispense Refill    albuterol (PROVENTIL/VENTOLIN HFA) 90 mcg/actuation inhaler UNHALE TWO PUFFS BY MOUTH EVERY 6 HOURS AS NEEDED FOR WHEEZING 18 g 1    cholecalciferol, vitamin D3, (VITAMIN D3) 125 mcg (5,000 unit) Tab Take 1 tablet (5,000 Units total) by mouth once daily. 90 tablet 3    fluticasone propionate (FLONASE) 50 mcg/actuation nasal spray SHAKE LIQUID AND USE 2 SPRAYS(100 MCG) IN EACH NOSTRIL EVERY DAY 16 g 11    latanoprost 0.005 % ophthalmic solution  "INT 1 GTT INTO OU ONCE A DAY  6    alprazolam (XANAX) 0.5 MG tablet Take 1 tablet (0.5 mg total) by mouth 2 (two) times daily as needed for Anxiety. 60 tablet 0     No current facility-administered medications on file prior to visit.        Allergies:  Metal staples [surgical stainless steel] and Penicillins    Immunizations:  Immunization History   Administered Date(s) Administered    Tdap 05/15/2019       Review of Systems:  Review of Systems   Respiratory: Positive for cough and shortness of breath.    Cardiovascular: Positive for leg swelling.   All other systems reviewed and are negative.      Objective:    Vitals:  Vitals:    09/08/20 0735   BP: 122/70   Pulse: 70   Temp: 97.7 °F (36.5 °C)   TempSrc: Temporal   Weight: 79.1 kg (174 lb 4.4 oz)   Height: 5' 3" (1.6 m)   PainSc: 0-No pain       Physical Exam  Constitutional:       General: She is not in acute distress.  HENT:      Head: Normocephalic and atraumatic.   Eyes:      Pupils: Pupils are equal, round, and reactive to light.   Neck:      Musculoskeletal: Neck supple.   Cardiovascular:      Rate and Rhythm: Normal rate and regular rhythm.      Heart sounds: No murmur. No friction rub.      Comments: Minimal leg swelling  Pulmonary:      Effort: Pulmonary effort is normal.      Breath sounds: Normal breath sounds.   Abdominal:      General: Bowel sounds are normal. There is no distension.      Palpations: Abdomen is soft.      Tenderness: There is no abdominal tenderness.   Skin:     General: Skin is warm and dry.      Findings: No rash.   Psychiatric:         Behavior: Behavior normal.         Data:  No previous labs, imaging, or notes available.        Jonah Stone MD  Family Medicine      "

## 2020-09-11 ENCOUNTER — TELEPHONE (OUTPATIENT)
Dept: FAMILY MEDICINE | Facility: CLINIC | Age: 80
End: 2020-09-11

## 2020-09-11 DIAGNOSIS — J06.9 UPPER RESPIRATORY TRACT INFECTION, UNSPECIFIED TYPE: Primary | ICD-10-CM

## 2020-09-11 RX ORDER — LEVOFLOXACIN 750 MG/1
750 TABLET ORAL DAILY
Qty: 5 TABLET | Refills: 0 | Status: SHIPPED | OUTPATIENT
Start: 2020-09-11 | End: 2020-10-22

## 2020-09-11 NOTE — TELEPHONE ENCOUNTER
----- Message from Bibi Iniguez sent at 9/11/2020  8:13 AM CDT -----  Contact: self  Type: Needs Medical Advice  Who Called:  patient  Symptoms (please be specific):  possible right ear infection hurting really bad  How long has patient had these symptoms:  9/8 she saw the doctor and someone flushed it out and now it is hurting worse  Pharmacy name and phone #:    [id number] [place] [address] AT [facility] & [address] [address]  Phone: [phone] Fax: [phone]  Best Call Back Number: [phone] (home) Please call back   Additional Information: She thinks she might need an antibiotic and her gland under her neck is starting to swell and she now has a sore throat.  Feels like she is in a tunnel.

## 2020-09-11 NOTE — TELEPHONE ENCOUNTER
Pt states ear pain has worsened since 9/8 visit, lymph node is swollen in her neck and her throat hurts. Asking if she needs antibiotics.

## 2020-10-15 ENCOUNTER — TELEPHONE (OUTPATIENT)
Dept: FAMILY MEDICINE | Facility: CLINIC | Age: 80
End: 2020-10-15

## 2020-10-15 NOTE — TELEPHONE ENCOUNTER
----- Message from Claribel Mason sent at 10/15/2020  9:46 AM CDT -----  Regarding: Advice  Contact: patient  Type: Needs Medical Advice    Who Called:  patient    Best Call Back Number:[phone] (home)       Additional Information: pt states she may be a few minutes late for her echo tomorrow morning at 7am.

## 2020-10-16 ENCOUNTER — CLINICAL SUPPORT (OUTPATIENT)
Dept: CARDIOLOGY | Facility: CLINIC | Age: 80
End: 2020-10-16
Attending: FAMILY MEDICINE
Payer: MEDICARE

## 2020-10-16 ENCOUNTER — TELEPHONE (OUTPATIENT)
Dept: FAMILY MEDICINE | Facility: CLINIC | Age: 80
End: 2020-10-16

## 2020-10-16 ENCOUNTER — HOSPITAL ENCOUNTER (OUTPATIENT)
Dept: RADIOLOGY | Facility: HOSPITAL | Age: 80
Discharge: HOME | End: 2020-10-16
Attending: FAMILY MEDICINE
Payer: MEDICARE

## 2020-10-16 VITALS — BODY MASS INDEX: 30.83 KG/M2 | WEIGHT: 174 LBS | HEIGHT: 63 IN | HEART RATE: 77 BPM

## 2020-10-16 DIAGNOSIS — R05.8 DRY COUGH: ICD-10-CM

## 2020-10-16 DIAGNOSIS — M85.80 OSTEOPENIA, UNSPECIFIED LOCATION: Primary | ICD-10-CM

## 2020-10-16 DIAGNOSIS — Z78.0 POSTMENOPAUSAL: ICD-10-CM

## 2020-10-16 DIAGNOSIS — R60.0 BILATERAL LOWER EXTREMITY EDEMA: ICD-10-CM

## 2020-10-16 LAB
ASCENDING AORTA: 2.98 CM
AV INDEX (PROSTH): 0.89
AV MEAN GRADIENT: 3 MMHG
AV PEAK GRADIENT: 6 MMHG
AV VALVE AREA: 2.7 CM2
AV VELOCITY RATIO: 0.79
BSA FOR ECHO PROCEDURE: 1.87 M2
CV ECHO LV RWT: 0.45 CM
DOP CALC AO PEAK VEL: 1.26 M/S
DOP CALC AO VTI: 28.23 CM
DOP CALC LVOT AREA: 3 CM2
DOP CALC LVOT DIAMETER: 1.97 CM
DOP CALC LVOT PEAK VEL: 0.99 M/S
DOP CALC LVOT STROKE VOLUME: 76.16 CM3
DOP CALCLVOT PEAK VEL VTI: 25 CM
E WAVE DECELERATION TIME: 208.71 MSEC
E/A RATIO: 0.75
E/E' RATIO: 6.56 M/S
ECHO LV POSTERIOR WALL: 0.96 CM (ref 0.6–1.1)
FRACTIONAL SHORTENING: 37 % (ref 28–44)
INTERVENTRICULAR SEPTUM: 0.78 CM (ref 0.6–1.1)
IVRT: 119.89 MSEC
LA MAJOR: 4.75 CM
LA MINOR: 4.73 CM
LA WIDTH: 3.07 CM
LEFT ATRIUM SIZE: 2.92 CM
LEFT ATRIUM VOLUME INDEX: 19.8 ML/M2
LEFT ATRIUM VOLUME: 36.12 CM3
LEFT INTERNAL DIMENSION IN SYSTOLE: 2.71 CM (ref 2.1–4)
LEFT VENTRICLE DIASTOLIC VOLUME INDEX: 45.63 ML/M2
LEFT VENTRICLE DIASTOLIC VOLUME: 83.16 ML
LEFT VENTRICLE MASS INDEX: 65 G/M2
LEFT VENTRICLE SYSTOLIC VOLUME INDEX: 14.9 ML/M2
LEFT VENTRICLE SYSTOLIC VOLUME: 27.17 ML
LEFT VENTRICULAR INTERNAL DIMENSION IN DIASTOLE: 4.3 CM (ref 3.5–6)
LEFT VENTRICULAR MASS: 117.78 G
LV LATERAL E/E' RATIO: 5.9 M/S
LV SEPTAL E/E' RATIO: 7.38 M/S
MV PEAK A VEL: 0.79 M/S
MV PEAK E VEL: 0.59 M/S
MV STENOSIS PRESSURE HALF TIME: 60.53 MS
MV VALVE AREA P 1/2 METHOD: 3.63 CM2
PISA MRMAX VEL: 0.04 M/S
PISA TR MAX VEL: 2.5 M/S
PULM VEIN S/D RATIO: 1.84
PV PEAK D VEL: 0.38 M/S
PV PEAK S VEL: 0.7 M/S
RA MAJOR: 5.53 CM
RA PRESSURE: 3 MMHG
RA WIDTH: 2.31 CM
RIGHT VENTRICULAR END-DIASTOLIC DIMENSION: 3.53 CM
SINUS: 3.01 CM
STJ: 2.65 CM
TDI LATERAL: 0.1 M/S
TDI SEPTAL: 0.08 M/S
TDI: 0.09 M/S
TR MAX PG: 25 MMHG
TRICUSPID ANNULAR PLANE SYSTOLIC EXCURSION: 3.43 CM
TV REST PULMONARY ARTERY PRESSURE: 28 MMHG

## 2020-10-16 PROCEDURE — 99212 OFFICE O/P EST SF 10 MIN: CPT | Mod: PBBFAC,25,PO

## 2020-10-16 PROCEDURE — 99999 PR PBB SHADOW E&M-EST. PATIENT-LVL II: CPT | Mod: PBBFAC,,,

## 2020-10-16 PROCEDURE — 99999 PR PBB SHADOW E&M-EST. PATIENT-LVL II: ICD-10-PCS | Mod: PBBFAC,,,

## 2020-10-16 PROCEDURE — 77080 DEXA BONE DENSITY SPINE HIP: ICD-10-PCS | Mod: 26,,, | Performed by: RADIOLOGY

## 2020-10-16 PROCEDURE — 77080 DXA BONE DENSITY AXIAL: CPT | Mod: TC,PO

## 2020-10-16 PROCEDURE — 93306 TTE W/DOPPLER COMPLETE: CPT | Mod: PBBFAC,PO | Performed by: INTERNAL MEDICINE

## 2020-10-16 PROCEDURE — 93306 ECHO (CUPID ONLY): ICD-10-PCS | Mod: 26,S$PBB,, | Performed by: INTERNAL MEDICINE

## 2020-10-16 PROCEDURE — 77080 DXA BONE DENSITY AXIAL: CPT | Mod: 26,,, | Performed by: RADIOLOGY

## 2020-10-16 RX ORDER — ALENDRONATE SODIUM 70 MG/1
70 TABLET ORAL
Qty: 12 TABLET | Refills: 3 | Status: SHIPPED | OUTPATIENT
Start: 2020-10-16 | End: 2021-07-07

## 2020-10-16 NOTE — TELEPHONE ENCOUNTER
Please call patient about bone scan  Let her know that she has osteopenia, severe osteopenia  This does increase her risk for fracture  Based on these numbers, she does qualify to have treatment with alendronate, a once weekly medication to strengthen her bones.  I sent the prescription in to her pharmacy.  Please instruct her to take medicine with full glass of water, needs to sit upright for 30 min after taking  We can recheck her bone scan in 3 years

## 2020-10-19 NOTE — TELEPHONE ENCOUNTER
Called pt, notified of results per Dr. Stone, she verbally understood. She wanted to come in and speak with Dr. Stone about it, appt scheduled.

## 2020-10-22 ENCOUNTER — OFFICE VISIT (OUTPATIENT)
Dept: FAMILY MEDICINE | Facility: CLINIC | Age: 80
End: 2020-10-22
Payer: MEDICARE

## 2020-10-22 VITALS
HEIGHT: 63 IN | SYSTOLIC BLOOD PRESSURE: 126 MMHG | BODY MASS INDEX: 31.03 KG/M2 | HEART RATE: 96 BPM | WEIGHT: 175.13 LBS | DIASTOLIC BLOOD PRESSURE: 72 MMHG | OXYGEN SATURATION: 96 % | TEMPERATURE: 97 F

## 2020-10-22 DIAGNOSIS — J30.9 ALLERGIC RHINITIS, UNSPECIFIED SEASONALITY, UNSPECIFIED TRIGGER: ICD-10-CM

## 2020-10-22 DIAGNOSIS — Z23 IMMUNIZATION DUE: ICD-10-CM

## 2020-10-22 DIAGNOSIS — R22.42 LOCALIZED SWELLING OF LEFT LOWER EXTREMITY: ICD-10-CM

## 2020-10-22 DIAGNOSIS — M85.80 OSTEOPENIA, UNSPECIFIED LOCATION: Primary | ICD-10-CM

## 2020-10-22 DIAGNOSIS — K59.09 CHRONIC CONSTIPATION: ICD-10-CM

## 2020-10-22 DIAGNOSIS — M65.331 TRIGGER MIDDLE FINGER OF RIGHT HAND: ICD-10-CM

## 2020-10-22 DIAGNOSIS — S16.1XXA STRAIN OF STERNOCLEIDOMASTOID MUSCLE, INITIAL ENCOUNTER: ICD-10-CM

## 2020-10-22 PROCEDURE — 99999 PR PBB SHADOW E&M-EST. PATIENT-LVL III: ICD-10-PCS | Mod: PBBFAC,,, | Performed by: FAMILY MEDICINE

## 2020-10-22 PROCEDURE — 99213 OFFICE O/P EST LOW 20 MIN: CPT | Mod: PBBFAC,PN | Performed by: FAMILY MEDICINE

## 2020-10-22 PROCEDURE — 99214 OFFICE O/P EST MOD 30 MIN: CPT | Mod: S$PBB,,, | Performed by: FAMILY MEDICINE

## 2020-10-22 PROCEDURE — 99999 PR PBB SHADOW E&M-EST. PATIENT-LVL III: CPT | Mod: PBBFAC,,, | Performed by: FAMILY MEDICINE

## 2020-10-22 PROCEDURE — 99214 PR OFFICE/OUTPT VISIT, EST, LEVL IV, 30-39 MIN: ICD-10-PCS | Mod: S$PBB,,, | Performed by: FAMILY MEDICINE

## 2020-10-22 RX ORDER — POLYETHYLENE GLYCOL 3350 17 G/17G
17 POWDER, FOR SOLUTION ORAL DAILY
COMMUNITY

## 2020-10-22 NOTE — PROGRESS NOTES
THIS DOCUMENT WAS MADE IN PART WITH VOICE RECOGNITION SOFTWARE.  OCCASIONALLY THIS SOFTWARE WILL MISINTERPRET WORDS OR PHRASES.    Assessment and Plan:    1. Osteopenia, unspecified location  Alendronate, follow-up imaging 3 years    2. Allergic rhinitis, unspecified seasonality, unspecified trigger  Greatly improved, continue Claritin, Flonase    3. Localized swelling of left lower extremity  Vascular insufficiency, normal echocardiogram  Continue leg elevation, recommended compression hose though she does have trouble putting those on, low-salt diet    4. Chronic constipation  Resolved, continue MiraLax, Amitiza if needed    5. Immunization due  Deferred influenza and pneumonia vaccine    6. Strain of sternocleidomastoid muscle, initial encounter  Monitor, treat if worsens    7. Trigger middle finger of right hand  Offered injection, patient will think about      ______________________________________________________________________  Subjective:    Chief Complaint:  Chief Complaint   Patient presents with    Results     dexa scan and echo results    Neck Pain     right side of neck     Leg Swelling     bilateral leg swelling         HPI:  Christa is a 80 y.o. year old     Right-sided neck pain  Will have the occasional sternocleidomastoid muscle spasm on the right side.  Mild in nature.  Patient was concerned she may have some carotid pathology.  No carotid bruits on exam    Right trigger finger   Classic history of snapping finger, pain located over the MCP joint.  No recent injury.  Prior history of trigger fingers    Immunizations due  Influenza vaccine, pneumococcal/Prevnar    Chronic constipation  Recommended continuation of MiraLax, fiber supplement as well as Amitiza twice daily    Lower extremity edema, dry cough follow-up  Prior visit  Foot swelling + SOB with exertion  Bilateral Swelling  Not so bad today, worse at end of day  Tries to limit sodium intake   Having associated dry cough / treating with  cough drop   Rhinitis, ear congestion, sneezing as well  Today  Rx-Claritin, Flonase for dry cough  Reports cough is much better   Lower extremity edema suspected to be vascular insufficiency, dependent edema  Recommended leg elevation, low-salt diet, compression hose  Echocardiogram results below      DEXA bone scan  Osteopenia on recent result, see below  Prescribed alendronate 70 mg to take once weekly to improve bone strength      Past Medical History:  Past Medical History:   Diagnosis Date    Cataract     OD       Past Surgical History:  Past Surgical History:   Procedure Laterality Date    CATARACT EXTRACTION Left        Family History:  History reviewed. No pertinent family history.    Social History:  Social History     Socioeconomic History    Marital status:      Spouse name: Not on file    Number of children: Not on file    Years of education: Not on file    Highest education level: Not on file   Occupational History    Not on file   Social Needs    Financial resource strain: Not on file    Food insecurity     Worry: Not on file     Inability: Not on file    Transportation needs     Medical: Not on file     Non-medical: Not on file   Tobacco Use    Smoking status: Former Smoker     Quit date: 2004     Years since quittin.8   Substance and Sexual Activity    Alcohol use: Not on file    Drug use: Not on file    Sexual activity: Not on file   Lifestyle    Physical activity     Days per week: Not on file     Minutes per session: Not on file    Stress: Not on file   Relationships    Social connections     Talks on phone: Not on file     Gets together: Not on file     Attends Anabaptism service: Not on file     Active member of club or organization: Not on file     Attends meetings of clubs or organizations: Not on file     Relationship status: Not on file   Other Topics Concern    Not on file   Social History Narrative    Not on file       Medications:  Current Outpatient  "Medications on File Prior to Visit   Medication Sig Dispense Refill    albuterol (PROVENTIL/VENTOLIN HFA) 90 mcg/actuation inhaler UNHALE TWO PUFFS BY MOUTH EVERY 6 HOURS AS NEEDED FOR WHEEZING 18 g 1    alendronate (FOSAMAX) 70 MG tablet Take 1 tablet (70 mg total) by mouth every 7 days. 12 tablet 3    cholecalciferol, vitamin D3, (VITAMIN D3) 125 mcg (5,000 unit) Tab Take 1 tablet (5,000 Units total) by mouth once daily. 90 tablet 3    fluticasone propionate (FLONASE) 50 mcg/actuation nasal spray SHAKE LIQUID AND USE 2 SPRAYS(100 MCG) IN EACH NOSTRIL EVERY DAY 16 g 11    loratadine (CLARITIN) 10 mg tablet Take 1 tablet (10 mg total) by mouth once daily. 30 tablet 2    polyethylene glycol (GLYCOLAX) 17 gram PwPk Take by mouth.      alprazolam (XANAX) 0.5 MG tablet Take 1 tablet (0.5 mg total) by mouth 2 (two) times daily as needed for Anxiety. 60 tablet 0    [DISCONTINUED] latanoprost 0.005 % ophthalmic solution INT 1 GTT INTO OU ONCE A DAY  6    [DISCONTINUED] levoFLOXacin (LEVAQUIN) 750 MG tablet Take 1 tablet (750 mg total) by mouth once daily. 5 tablet 0    [DISCONTINUED] lubiprostone (AMITIZA) 24 MCG Cap Take 1 capsule (24 mcg total) by mouth 2 (two) times daily with meals. 60 capsule 3     No current facility-administered medications on file prior to visit.        Allergies:  Metal staples [surgical stainless steel] and Penicillins    Immunizations:  Immunization History   Administered Date(s) Administered    Tdap 05/15/2019       Review of Systems:  Review of Systems   Respiratory: Positive for cough.    Cardiovascular: Positive for leg swelling.   Musculoskeletal: Positive for neck pain.   All other systems reviewed and are negative.      Objective:    Vitals:  Vitals:    10/22/20 1010   BP: 126/72   Pulse: 96   Temp: 97.3 °F (36.3 °C)   TempSrc: Temporal   SpO2: 96%   Weight: 79.5 kg (175 lb 2.5 oz)   Height: 5' 3" (1.6 m)   PainSc: 0-No pain       Physical Exam  Vitals signs reviewed. "   Constitutional:       General: She is not in acute distress.     Appearance: She is well-developed.   HENT:      Head: Normocephalic and atraumatic.   Neck:      Musculoskeletal: Normal range of motion.      Comments: Unremarkable exam, no carotid bruits  Pulmonary:      Effort: Pulmonary effort is normal. No respiratory distress.   Musculoskeletal:        Arms:       Right lower le+ Pitting Edema present.      Left lower le+ Pitting Edema present.   Psychiatric:         Behavior: Behavior normal.         Thought Content: Thought content normal.         Judgment: Judgment normal.         Data:  No previous labs, imaging, or notes available.        Jonah Stone MD  Family Medicine

## 2020-10-23 ENCOUNTER — LAB VISIT (OUTPATIENT)
Dept: LAB | Facility: OTHER | Age: 80
End: 2020-10-23
Payer: MEDICARE

## 2020-10-23 DIAGNOSIS — Z03.818 ENCOUNTER FOR OBSERVATION FOR SUSPECTED EXPOSURE TO OTHER BIOLOGICAL AGENTS RULED OUT: ICD-10-CM

## 2020-10-23 PROCEDURE — U0003 INFECTIOUS AGENT DETECTION BY NUCLEIC ACID (DNA OR RNA); SEVERE ACUTE RESPIRATORY SYNDROME CORONAVIRUS 2 (SARS-COV-2) (CORONAVIRUS DISEASE [COVID-19]), AMPLIFIED PROBE TECHNIQUE, MAKING USE OF HIGH THROUGHPUT TECHNOLOGIES AS DESCRIBED BY CMS-2020-01-R: HCPCS

## 2020-10-25 LAB — SARS-COV-2 RNA RESP QL NAA+PROBE: NOT DETECTED

## 2021-02-01 ENCOUNTER — LAB VISIT (OUTPATIENT)
Dept: LAB | Facility: OTHER | Age: 81
End: 2021-02-01
Payer: MEDICARE

## 2021-02-01 DIAGNOSIS — Z20.822 ENCOUNTER FOR LABORATORY TESTING FOR COVID-19 VIRUS: ICD-10-CM

## 2021-02-01 PROCEDURE — U0003 INFECTIOUS AGENT DETECTION BY NUCLEIC ACID (DNA OR RNA); SEVERE ACUTE RESPIRATORY SYNDROME CORONAVIRUS 2 (SARS-COV-2) (CORONAVIRUS DISEASE [COVID-19]), AMPLIFIED PROBE TECHNIQUE, MAKING USE OF HIGH THROUGHPUT TECHNOLOGIES AS DESCRIBED BY CMS-2020-01-R: HCPCS

## 2021-02-02 LAB — SARS-COV-2 RNA RESP QL NAA+PROBE: NOT DETECTED

## 2021-02-25 ENCOUNTER — OFFICE VISIT (OUTPATIENT)
Dept: FAMILY MEDICINE | Facility: CLINIC | Age: 81
End: 2021-02-25
Payer: MEDICARE

## 2021-02-25 VITALS
OXYGEN SATURATION: 98 % | DIASTOLIC BLOOD PRESSURE: 72 MMHG | WEIGHT: 177.25 LBS | BODY MASS INDEX: 31.41 KG/M2 | RESPIRATION RATE: 14 BRPM | HEART RATE: 76 BPM | HEIGHT: 63 IN | TEMPERATURE: 98 F | SYSTOLIC BLOOD PRESSURE: 126 MMHG

## 2021-02-25 DIAGNOSIS — E55.9 VITAMIN D INSUFFICIENCY: ICD-10-CM

## 2021-02-25 DIAGNOSIS — R26.89 BALANCE PROBLEM: ICD-10-CM

## 2021-02-25 DIAGNOSIS — M85.80 OSTEOPENIA, UNSPECIFIED LOCATION: Primary | ICD-10-CM

## 2021-02-25 DIAGNOSIS — L98.9 SKIN LESION: ICD-10-CM

## 2021-02-25 DIAGNOSIS — I99.8 VASCULAR INSUFFICIENCY: ICD-10-CM

## 2021-02-25 DIAGNOSIS — Z79.899 DRUG THERAPY: ICD-10-CM

## 2021-02-25 DIAGNOSIS — R53.81 PHYSICAL DECONDITIONING: ICD-10-CM

## 2021-02-25 PROCEDURE — 99999 PR PBB SHADOW E&M-EST. PATIENT-LVL V: CPT | Mod: PBBFAC,,, | Performed by: FAMILY MEDICINE

## 2021-02-25 PROCEDURE — 99999 PR PBB SHADOW E&M-EST. PATIENT-LVL V: ICD-10-PCS | Mod: PBBFAC,,, | Performed by: FAMILY MEDICINE

## 2021-02-25 PROCEDURE — 99214 PR OFFICE/OUTPT VISIT, EST, LEVL IV, 30-39 MIN: ICD-10-PCS | Mod: S$PBB,,, | Performed by: FAMILY MEDICINE

## 2021-02-25 PROCEDURE — 99215 OFFICE O/P EST HI 40 MIN: CPT | Mod: PBBFAC,PN | Performed by: FAMILY MEDICINE

## 2021-02-25 PROCEDURE — 99214 OFFICE O/P EST MOD 30 MIN: CPT | Mod: S$PBB,,, | Performed by: FAMILY MEDICINE

## 2021-04-13 ENCOUNTER — LAB VISIT (OUTPATIENT)
Dept: LAB | Facility: HOSPITAL | Age: 81
End: 2021-04-13
Attending: FAMILY MEDICINE
Payer: MEDICARE

## 2021-04-13 DIAGNOSIS — Z79.899 DRUG THERAPY: ICD-10-CM

## 2021-04-13 LAB
ALBUMIN SERPL BCP-MCNC: 3.9 G/DL (ref 3.5–5.2)
ALP SERPL-CCNC: 80 U/L (ref 55–135)
ALT SERPL W/O P-5'-P-CCNC: 6 U/L (ref 10–44)
ANION GAP SERPL CALC-SCNC: 6 MMOL/L (ref 8–16)
AST SERPL-CCNC: 21 U/L (ref 10–40)
BASOPHILS # BLD AUTO: 0.04 K/UL (ref 0–0.2)
BASOPHILS NFR BLD: 0.6 % (ref 0–1.9)
BILIRUB SERPL-MCNC: 0.6 MG/DL (ref 0.1–1)
BUN SERPL-MCNC: 12 MG/DL (ref 8–23)
CALCIUM SERPL-MCNC: 9.4 MG/DL (ref 8.7–10.5)
CHLORIDE SERPL-SCNC: 106 MMOL/L (ref 95–110)
CHOLEST SERPL-MCNC: 169 MG/DL (ref 120–199)
CHOLEST/HDLC SERPL: 2.8 {RATIO} (ref 2–5)
CO2 SERPL-SCNC: 28 MMOL/L (ref 23–29)
CREAT SERPL-MCNC: 0.6 MG/DL (ref 0.5–1.4)
DIFFERENTIAL METHOD: ABNORMAL
EOSINOPHIL # BLD AUTO: 0.2 K/UL (ref 0–0.5)
EOSINOPHIL NFR BLD: 3.9 % (ref 0–8)
ERYTHROCYTE [DISTWIDTH] IN BLOOD BY AUTOMATED COUNT: 14.6 % (ref 11.5–14.5)
EST. GFR  (AFRICAN AMERICAN): >60 ML/MIN/1.73 M^2
EST. GFR  (NON AFRICAN AMERICAN): >60 ML/MIN/1.73 M^2
GLUCOSE SERPL-MCNC: 98 MG/DL (ref 70–110)
HCT VFR BLD AUTO: 39.2 % (ref 37–48.5)
HDLC SERPL-MCNC: 60 MG/DL (ref 40–75)
HDLC SERPL: 35.5 % (ref 20–50)
HGB BLD-MCNC: 12.7 G/DL (ref 12–16)
IMM GRANULOCYTES # BLD AUTO: 0.02 K/UL (ref 0–0.04)
IMM GRANULOCYTES NFR BLD AUTO: 0.3 % (ref 0–0.5)
LDLC SERPL CALC-MCNC: 99 MG/DL (ref 63–159)
LYMPHOCYTES # BLD AUTO: 2.9 K/UL (ref 1–4.8)
LYMPHOCYTES NFR BLD: 46.2 % (ref 18–48)
MCH RBC QN AUTO: 28.4 PG (ref 27–31)
MCHC RBC AUTO-ENTMCNC: 32.4 G/DL (ref 32–36)
MCV RBC AUTO: 88 FL (ref 82–98)
MONOCYTES # BLD AUTO: 0.4 K/UL (ref 0.3–1)
MONOCYTES NFR BLD: 6.8 % (ref 4–15)
NEUTROPHILS # BLD AUTO: 2.6 K/UL (ref 1.8–7.7)
NEUTROPHILS NFR BLD: 42.2 % (ref 38–73)
NONHDLC SERPL-MCNC: 109 MG/DL
NRBC BLD-RTO: 0 /100 WBC
PLATELET # BLD AUTO: 294 K/UL (ref 150–450)
PMV BLD AUTO: 11 FL (ref 9.2–12.9)
POTASSIUM SERPL-SCNC: 4.1 MMOL/L (ref 3.5–5.1)
PROT SERPL-MCNC: 7.1 G/DL (ref 6–8.4)
RBC # BLD AUTO: 4.47 M/UL (ref 4–5.4)
SODIUM SERPL-SCNC: 140 MMOL/L (ref 136–145)
TRIGL SERPL-MCNC: 50 MG/DL (ref 30–150)
WBC # BLD AUTO: 6.19 K/UL (ref 3.9–12.7)

## 2021-04-13 PROCEDURE — 85025 COMPLETE CBC W/AUTO DIFF WBC: CPT | Performed by: FAMILY MEDICINE

## 2021-04-13 PROCEDURE — 36415 COLL VENOUS BLD VENIPUNCTURE: CPT | Mod: PN | Performed by: FAMILY MEDICINE

## 2021-04-13 PROCEDURE — 80061 LIPID PANEL: CPT | Performed by: FAMILY MEDICINE

## 2021-04-13 PROCEDURE — 80053 COMPREHEN METABOLIC PANEL: CPT | Performed by: FAMILY MEDICINE

## 2021-04-13 PROCEDURE — 82306 VITAMIN D 25 HYDROXY: CPT | Performed by: FAMILY MEDICINE

## 2021-04-14 ENCOUNTER — PATIENT MESSAGE (OUTPATIENT)
Dept: FAMILY MEDICINE | Facility: CLINIC | Age: 81
End: 2021-04-14

## 2021-04-14 DIAGNOSIS — E55.9 VITAMIN D DEFICIENCY: Primary | ICD-10-CM

## 2021-04-14 LAB — 25(OH)D3+25(OH)D2 SERPL-MCNC: 23 NG/ML (ref 30–96)

## 2021-04-14 RX ORDER — ERGOCALCIFEROL 1.25 MG/1
50000 CAPSULE ORAL
Qty: 12 CAPSULE | Refills: 4 | Status: SHIPPED | OUTPATIENT
Start: 2021-04-14 | End: 2022-04-01

## 2021-04-29 ENCOUNTER — PATIENT MESSAGE (OUTPATIENT)
Dept: RESEARCH | Facility: HOSPITAL | Age: 81
End: 2021-04-29

## 2021-05-05 ENCOUNTER — TELEPHONE (OUTPATIENT)
Dept: DERMATOLOGY | Facility: CLINIC | Age: 81
End: 2021-05-05

## 2021-06-01 ENCOUNTER — TELEPHONE (OUTPATIENT)
Dept: FAMILY MEDICINE | Facility: CLINIC | Age: 81
End: 2021-06-01

## 2021-06-02 ENCOUNTER — TELEPHONE (OUTPATIENT)
Dept: FAMILY MEDICINE | Facility: CLINIC | Age: 81
End: 2021-06-02

## 2021-07-07 ENCOUNTER — HOSPITAL ENCOUNTER (OUTPATIENT)
Dept: RADIOLOGY | Facility: HOSPITAL | Age: 81
Discharge: HOME | End: 2021-07-07
Attending: FAMILY MEDICINE
Payer: MEDICARE

## 2021-07-07 ENCOUNTER — OFFICE VISIT (OUTPATIENT)
Dept: FAMILY MEDICINE | Facility: CLINIC | Age: 81
End: 2021-07-07
Payer: MEDICARE

## 2021-07-07 VITALS
OXYGEN SATURATION: 95 % | DIASTOLIC BLOOD PRESSURE: 78 MMHG | BODY MASS INDEX: 30.94 KG/M2 | SYSTOLIC BLOOD PRESSURE: 138 MMHG | HEART RATE: 84 BPM | TEMPERATURE: 99 F | HEIGHT: 63 IN | WEIGHT: 174.63 LBS

## 2021-07-07 DIAGNOSIS — R13.10 DYSPHAGIA, UNSPECIFIED TYPE: ICD-10-CM

## 2021-07-07 DIAGNOSIS — R05.3 CHRONIC COUGH: ICD-10-CM

## 2021-07-07 DIAGNOSIS — R05.3 CHRONIC COUGH: Primary | ICD-10-CM

## 2021-07-07 PROCEDURE — 99214 OFFICE O/P EST MOD 30 MIN: CPT | Mod: PBBFAC,PN | Performed by: FAMILY MEDICINE

## 2021-07-07 PROCEDURE — 71046 X-RAY EXAM CHEST 2 VIEWS: CPT | Mod: 26,,, | Performed by: RADIOLOGY

## 2021-07-07 PROCEDURE — 71046 X-RAY EXAM CHEST 2 VIEWS: CPT | Mod: TC,PN

## 2021-07-07 PROCEDURE — 99999 PR PBB SHADOW E&M-EST. PATIENT-LVL IV: ICD-10-PCS | Mod: PBBFAC,,, | Performed by: FAMILY MEDICINE

## 2021-07-07 PROCEDURE — 71046 XR CHEST PA AND LATERAL: ICD-10-PCS | Mod: 26,,, | Performed by: RADIOLOGY

## 2021-07-07 PROCEDURE — 99214 PR OFFICE/OUTPT VISIT, EST, LEVL IV, 30-39 MIN: ICD-10-PCS | Mod: S$PBB,,, | Performed by: FAMILY MEDICINE

## 2021-07-07 PROCEDURE — 99999 PR PBB SHADOW E&M-EST. PATIENT-LVL IV: CPT | Mod: PBBFAC,,, | Performed by: FAMILY MEDICINE

## 2021-07-07 PROCEDURE — 99214 OFFICE O/P EST MOD 30 MIN: CPT | Mod: S$PBB,,, | Performed by: FAMILY MEDICINE

## 2021-07-07 RX ORDER — PREDNISOLONE ACETATE 10 MG/ML
SUSPENSION/ DROPS OPHTHALMIC
COMMUNITY
Start: 2021-07-01 | End: 2022-11-15

## 2021-07-07 RX ORDER — KETOROLAC TROMETHAMINE 5 MG/ML
1 SOLUTION OPHTHALMIC
COMMUNITY
Start: 2021-07-01 | End: 2022-04-01

## 2021-07-07 RX ORDER — PANTOPRAZOLE SODIUM 40 MG/1
40 TABLET, DELAYED RELEASE ORAL DAILY
Qty: 30 TABLET | Refills: 11 | Status: SHIPPED | OUTPATIENT
Start: 2021-07-07 | End: 2022-08-26

## 2021-07-07 RX ORDER — FLUTICASONE PROPIONATE 50 MCG
2 SPRAY, SUSPENSION (ML) NASAL DAILY
Qty: 16 G | Refills: 11 | Status: SHIPPED | OUTPATIENT
Start: 2021-07-07 | End: 2022-04-01 | Stop reason: SDUPTHER

## 2021-07-07 RX ORDER — MOXIFLOXACIN 5 MG/ML
SOLUTION/ DROPS OPHTHALMIC
COMMUNITY
Start: 2021-07-01 | End: 2022-04-01

## 2021-11-09 ENCOUNTER — TELEPHONE (OUTPATIENT)
Dept: FAMILY MEDICINE | Facility: CLINIC | Age: 81
End: 2021-11-09
Payer: MEDICARE

## 2022-01-14 ENCOUNTER — LAB VISIT (OUTPATIENT)
Dept: LAB | Facility: HOSPITAL | Age: 82
End: 2022-01-14
Attending: FAMILY MEDICINE
Payer: MEDICARE

## 2022-01-14 ENCOUNTER — OFFICE VISIT (OUTPATIENT)
Dept: FAMILY MEDICINE | Facility: CLINIC | Age: 82
End: 2022-01-14
Payer: MEDICARE

## 2022-01-14 VITALS — HEART RATE: 97 BPM | SYSTOLIC BLOOD PRESSURE: 132 MMHG | OXYGEN SATURATION: 97 % | DIASTOLIC BLOOD PRESSURE: 86 MMHG

## 2022-01-14 DIAGNOSIS — G44.52 NEW DAILY PERSISTENT HEADACHE: Primary | ICD-10-CM

## 2022-01-14 DIAGNOSIS — Z23 IMMUNIZATION DUE: ICD-10-CM

## 2022-01-14 DIAGNOSIS — I99.8 VASCULAR INSUFFICIENCY: ICD-10-CM

## 2022-01-14 DIAGNOSIS — M85.80 OSTEOPENIA, UNSPECIFIED LOCATION: ICD-10-CM
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
## 2022-01-14 DIAGNOSIS — J32.9 CHRONIC SINUSITIS, UNSPECIFIED LOCATION: ICD-10-CM

## 2022-01-14 DIAGNOSIS — S06.0X1A CONCUSSION WITH LOSS OF CONSCIOUSNESS OF 30 MINUTES OR LESS, INITIAL ENCOUNTER: ICD-10-CM

## 2022-01-14 DIAGNOSIS — Z79.899 DRUG THERAPY: ICD-10-CM

## 2022-01-14 DIAGNOSIS — K59.09 CHRONIC CONSTIPATION: ICD-10-CM

## 2022-01-14 DIAGNOSIS — E55.9 VITAMIN D INSUFFICIENCY: ICD-10-CM

## 2022-01-14 DIAGNOSIS — R42 DIZZY: ICD-10-CM

## 2022-01-14 LAB
ALBUMIN SERPL BCP-MCNC: 4.3 G/DL (ref 3.5–5.2)
ALP SERPL-CCNC: 80 U/L (ref 55–135)
ALT SERPL W/O P-5'-P-CCNC: 9 U/L (ref 10–44)
ANION GAP SERPL CALC-SCNC: 12 MMOL/L (ref 8–16)
AST SERPL-CCNC: 26 U/L (ref 10–40)
BASOPHILS # BLD AUTO: 0.04 K/UL (ref 0–0.2)
BASOPHILS NFR BLD: 0.6 % (ref 0–1.9)
BILIRUB SERPL-MCNC: 0.5 MG/DL (ref 0.1–1)
BUN SERPL-MCNC: 12 MG/DL (ref 8–23)
CALCIUM SERPL-MCNC: 10 MG/DL (ref 8.7–10.5)
CHLORIDE SERPL-SCNC: 98 MMOL/L (ref 95–110)
CHOLEST SERPL-MCNC: 178 MG/DL (ref 120–199)
CHOLEST/HDLC SERPL: 2.7 {RATIO} (ref 2–5)
CO2 SERPL-SCNC: 26 MMOL/L (ref 23–29)
CREAT SERPL-MCNC: 0.6 MG/DL (ref 0.5–1.4)
DIFFERENTIAL METHOD: NORMAL
EOSINOPHIL # BLD AUTO: 0.1 K/UL (ref 0–0.5)
EOSINOPHIL NFR BLD: 2.1 % (ref 0–8)
ERYTHROCYTE [DISTWIDTH] IN BLOOD BY AUTOMATED COUNT: 14.5 % (ref 11.5–14.5)
EST. GFR  (AFRICAN AMERICAN): >60 ML/MIN/1.73 M^2
EST. GFR  (NON AFRICAN AMERICAN): >60 ML/MIN/1.73 M^2
GLUCOSE SERPL-MCNC: 96 MG/DL (ref 70–110)
HCT VFR BLD AUTO: 41.2 % (ref 37–48.5)
HDLC SERPL-MCNC: 67 MG/DL (ref 40–75)
HDLC SERPL: 37.6 % (ref 20–50)
HGB BLD-MCNC: 13.3 G/DL (ref 12–16)
IMM GRANULOCYTES # BLD AUTO: 0.01 K/UL (ref 0–0.04)
IMM GRANULOCYTES NFR BLD AUTO: 0.2 % (ref 0–0.5)
LDLC SERPL CALC-MCNC: 99.4 MG/DL (ref 63–159)
LYMPHOCYTES # BLD AUTO: 2.8 K/UL (ref 1–4.8)
LYMPHOCYTES NFR BLD: 42.3 % (ref 18–48)
MCH RBC QN AUTO: 28.2 PG (ref 27–31)
MCHC RBC AUTO-ENTMCNC: 32.3 G/DL (ref 32–36)
MCV RBC AUTO: 88 FL (ref 82–98)
MONOCYTES # BLD AUTO: 0.5 K/UL (ref 0.3–1)
MONOCYTES NFR BLD: 7.5 % (ref 4–15)
NEUTROPHILS # BLD AUTO: 3.1 K/UL (ref 1.8–7.7)
NEUTROPHILS NFR BLD: 47.3 % (ref 38–73)
NONHDLC SERPL-MCNC: 111 MG/DL
NRBC BLD-RTO: 0 /100 WBC
PLATELET # BLD AUTO: 316 K/UL (ref 150–450)
PMV BLD AUTO: 10.9 FL (ref 9.2–12.9)
POTASSIUM SERPL-SCNC: 4 MMOL/L (ref 3.5–5.1)
PROT SERPL-MCNC: 7.7 G/DL (ref 6–8.4)
RBC # BLD AUTO: 4.71 M/UL (ref 4–5.4)
SODIUM SERPL-SCNC: 136 MMOL/L (ref 136–145)
TRIGL SERPL-MCNC: 58 MG/DL (ref 30–150)
WBC # BLD AUTO: 6.64 K/UL (ref 3.9–12.7)

## 2022-01-14 PROCEDURE — 99214 PR OFFICE/OUTPT VISIT, EST, LEVL IV, 30-39 MIN: ICD-10-PCS | Mod: S$PBB,,, | Performed by: FAMILY MEDICINE

## 2022-01-14 PROCEDURE — 99999 PR PBB SHADOW E&M-EST. PATIENT-LVL IV: CPT | Mod: PBBFAC,,, | Performed by: FAMILY MEDICINE

## 2022-01-14 PROCEDURE — 85025 COMPLETE CBC W/AUTO DIFF WBC: CPT | Performed by: FAMILY MEDICINE

## 2022-01-14 PROCEDURE — 99999 PR PBB SHADOW E&M-EST. PATIENT-LVL IV: ICD-10-PCS | Mod: PBBFAC,,, | Performed by: FAMILY MEDICINE

## 2022-01-14 PROCEDURE — 99214 OFFICE O/P EST MOD 30 MIN: CPT | Mod: PBBFAC,PN | Performed by: FAMILY MEDICINE

## 2022-01-14 PROCEDURE — 36415 COLL VENOUS BLD VENIPUNCTURE: CPT | Mod: PN | Performed by: FAMILY MEDICINE

## 2022-01-14 PROCEDURE — 80061 LIPID PANEL: CPT | Performed by: FAMILY MEDICINE

## 2022-01-14 PROCEDURE — 99214 OFFICE O/P EST MOD 30 MIN: CPT | Mod: S$PBB,,, | Performed by: FAMILY MEDICINE

## 2022-01-14 PROCEDURE — 80053 COMPREHEN METABOLIC PANEL: CPT | Performed by: FAMILY MEDICINE

## 2022-01-14 NOTE — PROGRESS NOTES
THIS DOCUMENT WAS MADE IN PART WITH VOICE RECOGNITION SOFTWARE.  OCCASIONALLY THIS SOFTWARE WILL MISINTERPRET WORDS OR PHRASES.    Assessment and Plan:    1. New daily persistent headache  Likely secondary to concussion with loss of consciousness  Considering her age and risk factors I would like to do an MRI brain to rule out any structural damage or brain bleed  Recommended Tylenol as needed for pain, anticipate improvement over the next few weeks  - MRI Brain Without Contrast; Future    2. Fall, initial encounter  Will sign patient up for physical therapy secondary to unsteady gait, lower extremity weakness, recent concussion  High fall risk    3. Dizzy  Secondary to concussion and possible structural brain damage  Check MRI as noted above  Ambulate with caution  Physical therapy as ordered    4. Vitamin D insufficiency  Continue vitamin-D supplement, unable to perform lab check today    5. Osteopenia  No fractures, continue vitamin-D supplementation    6. Vascular insufficiency  1+ pitting edema today, low-salt diet and compression stockings and leg elevation when possible    7. Drug therapy  - CBC Auto Differential; Future  - Comprehensive Metabolic Panel; Future  - Lipid Panel; Future  - Urinalysis, Reflex to Urine Culture Urine, Clean Catch; Future    8. Immunization due  Upcoming COVID booster, defers other vaccinations    9. Chronic constipation  Stable    10. Chronic sinusitis  Asymptomatic today    11. Concussion with loss of consciousness of 30 minutes or less, initial encounter  Follow-up in 1 month  - Ambulatory referral/consult to Home Health; Future        ______________________________________________________________________  Subjective:    Chief Complaint:  Chief Complaint   Patient presents with    Follow-up    Dizziness     Fell back on 01/07/22 still having dizziness/headache        HPI:  Christa is a 81 y.o. year old     Fall, dizziness  01/07/2022-fell on back, sent to emergency  "department  Reports a pallet carrying boxes was being moved and some boxes fell off, struck the patient and caused a fall   Fell while in [employer], struck her head and did lose consciousness for several minutes per daughter who was present  Evaluated at [facility] and discharged, "CT Head was normal"   Today, reports still having headache and dizziness  Also reports decreased visual acuity in right eye and neck stiffness   Increased neuropathy type tingling to bilateral lower extremities, more so that before   Though process a little foggy after the fall per patient's kids   + Emotional lability       Osteopenia, Vitamin-D deficiency  No recent fractures, taking vitamin-D replacement weekly    History of vascular insufficiency  2+ swelling today     Gastric reflux, heartburn  Rx-Protonix 40 mg daily  Denies any heartburn, dysphagia    Chronic sinusitis  Rx-Claritin, Flonase  Symptoms well controlled    Past Medical History:  Past Medical History:   Diagnosis Date    Cataract     OD       Past Surgical History:  Past Surgical History:   Procedure Laterality Date    CATARACT EXTRACTION Left        Family History:  No family history on file.    Social History:  Social History     Socioeconomic History    Marital status:    Tobacco Use    Smoking status: Former Smoker     Quit date: 2004     Years since quittin.0    Smokeless tobacco: Never Used       Medications:  Current Outpatient Medications on File Prior to Visit   Medication Sig Dispense Refill    cholecalciferol, vitamin D3, (VITAMIN D3) 125 mcg (5,000 unit) Tab Take 1 tablet (5,000 Units total) by mouth once daily. 90 tablet 3    ergocalciferol (ERGOCALCIFEROL) 50,000 unit Cap Take 1 capsule (50,000 Units total) by mouth every 7 days. 12 capsule 4    fluticasone propionate (FLONASE) 50 mcg/actuation nasal spray 2 sprays (100 mcg total) by Each Nostril route once daily. 16 g 11    ketorolac 0.5% (ACULAR) 0.5 % Drop       " moxifloxacin (VIGAMOX) 0.5 % ophthalmic solution       pantoprazole (PROTONIX) 40 MG tablet Take 1 tablet (40 mg total) by mouth once daily. 30 tablet 11    polyethylene glycol (GLYCOLAX) 17 gram PwPk Take by mouth.      prednisoLONE acetate (PRED FORTE) 1 % DrpS       alprazolam (XANAX) 0.5 MG tablet Take 1 tablet (0.5 mg total) by mouth 2 (two) times daily as needed for Anxiety. 60 tablet 0    loratadine (CLARITIN) 10 mg tablet Take 1 tablet (10 mg total) by mouth once daily. 30 tablet 2     No current facility-administered medications on file prior to visit.       Allergies:  Metal staples [surgical stainless steel], Penicillins, and Levofloxacin    Immunizations:  Immunization History   Administered Date(s) Administered    COVID-19, MRNA, LN-S, PF (MODERNA FULL 0.5 ML DOSE) 02/18/2021, 03/18/2021    Tdap 05/15/2019       Review of Systems:  Review of Systems   All other systems reviewed and are negative.      Objective:    Vitals:  Vitals:    01/14/22 1007   BP: 132/86   Pulse: 97   SpO2: 97%       Physical Exam  Vitals reviewed.   Constitutional:       General: She is not in acute distress.  HENT:      Head: Normocephalic and atraumatic.   Eyes:      Extraocular Movements: EOM normal.      Pupils: Pupils are equal, round, and reactive to light.   Cardiovascular:      Rate and Rhythm: Normal rate and regular rhythm.      Heart sounds: No murmur heard.  No friction rub.   Pulmonary:      Effort: Pulmonary effort is normal.      Breath sounds: Normal breath sounds.   Abdominal:      General: Bowel sounds are normal. There is no distension.      Palpations: Abdomen is soft.      Tenderness: There is no abdominal tenderness.   Musculoskeletal:      Cervical back: Neck supple.   Skin:     General: Skin is warm and dry.      Findings: No rash.   Neurological:      Mental Status: She is alert and oriented to person, place, and time.      GCS: GCS eye subscore is 4. GCS verbal subscore is 5. GCS motor subscore is  6.      Cranial Nerves: Cranial nerves are intact.      Sensory: Sensation is intact.      Motor: Motor function is intact.      Coordination: Romberg sign positive. Finger-Nose-Finger Test normal.      Deep Tendon Reflexes:      Reflex Scores:       Patellar reflexes are 2+ on the right side and 2+ on the left side.  Psychiatric:         Mood and Affect: Mood and affect normal.         Behavior: Behavior normal.             Jonah Stone MD  Family Medicine

## 2022-01-15 PROCEDURE — G0180 MD CERTIFICATION HHA PATIENT: HCPCS | Mod: ,,, | Performed by: FAMILY MEDICINE

## 2022-01-15 PROCEDURE — G0180 PR HOME HEALTH MD CERTIFICATION: ICD-10-PCS | Mod: ,,, | Performed by: FAMILY MEDICINE

## 2022-01-24 ENCOUNTER — EXTERNAL HOME HEALTH (OUTPATIENT)
Dept: HOME HEALTH SERVICES | Facility: HOSPITAL | Age: 82
End: 2022-01-24
Payer: MEDICARE

## 2022-02-03 ENCOUNTER — DOCUMENT SCAN (OUTPATIENT)
Dept: HOME HEALTH SERVICES | Facility: HOSPITAL | Age: 82
End: 2022-02-03
Payer: MEDICARE

## 2022-02-04 ENCOUNTER — HOSPITAL ENCOUNTER (OUTPATIENT)
Dept: RADIOLOGY | Facility: HOSPITAL | Age: 82
Discharge: HOME | End: 2022-02-04
Attending: FAMILY MEDICINE
Payer: MEDICARE

## 2022-02-04 DIAGNOSIS — G44.52 NEW DAILY PERSISTENT HEADACHE: ICD-10-CM

## 2022-02-04 PROCEDURE — 70551 MRI BRAIN WITHOUT CONTRAST: ICD-10-PCS | Mod: 26,,, | Performed by: RADIOLOGY

## 2022-02-04 PROCEDURE — 70551 MRI BRAIN STEM W/O DYE: CPT | Mod: 26,,, | Performed by: RADIOLOGY

## 2022-02-04 PROCEDURE — 70551 MRI BRAIN STEM W/O DYE: CPT | Mod: TC,PO

## 2022-03-04 ENCOUNTER — DOCUMENT SCAN (OUTPATIENT)
Dept: HOME HEALTH SERVICES | Facility: HOSPITAL | Age: 82
End: 2022-03-04
Payer: MEDICARE

## 2022-03-09 ENCOUNTER — DOCUMENT SCAN (OUTPATIENT)
Dept: HOME HEALTH SERVICES | Facility: HOSPITAL | Age: 82
End: 2022-03-09
Payer: MEDICARE

## 2022-03-14 ENCOUNTER — TELEPHONE (OUTPATIENT)
Dept: FAMILY MEDICINE | Facility: CLINIC | Age: 82
End: 2022-03-14
Payer: MEDICARE

## 2022-03-14 NOTE — TELEPHONE ENCOUNTER
----- Message from Jessica Oliver sent at 3/14/2022  9:34 AM CDT -----  Regarding: pt called  Name of Who is Calling: ALIYAH KEY [[id number]]      What is the request in detail: pt is requesting an appt for her MRI results. Please advise       Can the clinic reply by [facility]: No      What Number to Call Back if not in [facility]: [phone]

## 2022-04-01 ENCOUNTER — OFFICE VISIT (OUTPATIENT)
Dept: FAMILY MEDICINE | Facility: CLINIC | Age: 82
End: 2022-04-01
Payer: MEDICARE

## 2022-04-01 ENCOUNTER — TELEPHONE (OUTPATIENT)
Dept: FAMILY MEDICINE | Facility: CLINIC | Age: 82
End: 2022-04-01

## 2022-04-01 VITALS
TEMPERATURE: 99 F | DIASTOLIC BLOOD PRESSURE: 62 MMHG | SYSTOLIC BLOOD PRESSURE: 132 MMHG | BODY MASS INDEX: 31.41 KG/M2 | HEART RATE: 84 BPM | RESPIRATION RATE: 14 BRPM | HEIGHT: 63 IN | WEIGHT: 177.25 LBS

## 2022-04-01 DIAGNOSIS — M25.551 RIGHT HIP PAIN: ICD-10-CM

## 2022-04-01 DIAGNOSIS — Z23 IMMUNIZATION DUE: ICD-10-CM

## 2022-04-01 DIAGNOSIS — H53.8 BLURRED VISION: ICD-10-CM

## 2022-04-01 DIAGNOSIS — R45.89 DEPRESSED MOOD: ICD-10-CM
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
## 2022-04-01 DIAGNOSIS — R26.81 GAIT INSTABILITY: ICD-10-CM

## 2022-04-01 DIAGNOSIS — R13.10 DYSPHAGIA, UNSPECIFIED TYPE: ICD-10-CM

## 2022-04-01 DIAGNOSIS — R60.0 PERIPHERAL EDEMA: ICD-10-CM

## 2022-04-01 DIAGNOSIS — G44.52 NEW DAILY PERSISTENT HEADACHE: Primary | ICD-10-CM

## 2022-04-01 DIAGNOSIS — N32.81 OAB (OVERACTIVE BLADDER): ICD-10-CM

## 2022-04-01 DIAGNOSIS — R05.3 CHRONIC COUGH: ICD-10-CM

## 2022-04-01 DIAGNOSIS — R42 DIZZY: ICD-10-CM

## 2022-04-01 PROCEDURE — 99214 PR OFFICE/OUTPT VISIT, EST, LEVL IV, 30-39 MIN: ICD-10-PCS | Mod: S$PBB,,, | Performed by: FAMILY MEDICINE

## 2022-04-01 PROCEDURE — 99999 PR PBB SHADOW E&M-EST. PATIENT-LVL V: ICD-10-PCS | Mod: PBBFAC,,, | Performed by: FAMILY MEDICINE

## 2022-04-01 PROCEDURE — 99214 OFFICE O/P EST MOD 30 MIN: CPT | Mod: S$PBB,,, | Performed by: FAMILY MEDICINE

## 2022-04-01 PROCEDURE — 99999 PR PBB SHADOW E&M-EST. PATIENT-LVL V: CPT | Mod: PBBFAC,,, | Performed by: FAMILY MEDICINE

## 2022-04-01 PROCEDURE — 99215 OFFICE O/P EST HI 40 MIN: CPT | Mod: PBBFAC,PN | Performed by: FAMILY MEDICINE

## 2022-04-01 RX ORDER — FLUTICASONE PROPIONATE 50 MCG
2 SPRAY, SUSPENSION (ML) NASAL DAILY
Qty: 54 G | Refills: 3 | Status: SHIPPED | OUTPATIENT
Start: 2022-04-01 | End: 2023-05-01

## 2022-04-01 RX ORDER — MIRABEGRON 25 MG/1
25 TABLET, FILM COATED, EXTENDED RELEASE ORAL DAILY
Qty: 30 TABLET | Refills: 11 | Status: SHIPPED | OUTPATIENT
Start: 2022-04-01 | End: 2022-11-15

## 2022-04-01 NOTE — PROGRESS NOTES
" THIS DOCUMENT WAS MADE IN PART WITH VOICE RECOGNITION SOFTWARE.  OCCASIONALLY THIS SOFTWARE WILL MISINTERPRET WORDS OR PHRASES.    Assessment and Plan:    1. New daily persistent headache     2. Fall, initial encounter     3. Dizzy     4. Immunization due     5. Right hip pain  Ambulatory referral/consult to Orthopedics   6. Chronic cough  fluticasone propionate (FLONASE) 50 mcg/actuation nasal spray   7. Dysphagia, unspecified type  fluticasone propionate (FLONASE) 50 mcg/actuation nasal spray   8. Blurred vision  Ambulatory referral/consult to Ophthalmology   9. Gait instability  Ambulatory referral/consult to Physical/Occupational Therapy   10. Peripheral edema  SUBSEQUENT HOME HEALTH ORDERS   11. OAB (overactive bladder)  mirabegron (MYRBETRIQ) 25 mg Tb24 ER tablet   12. Depressed mood         Patient with several uncontrolled problems  Overactive bladder-initiate Myrbetriq, anti cholinergic drugs would be a little too high risk given her other health issues  Ordered lymphedema therapy through home health  Refer to ophthalmology for visual acuity deficit  Prescribed fluticasone for what sounds like sinus headaches, postnasal drip  Refer to orthopedist of choice for evaluation of hip pain status post hip arthroplasty  Follow-up in 4 weeks for re-evaluation of these issues    ______________________________________________________________________  Subjective:    Chief Complaint:  Chief Complaint   Patient presents with    Follow-up     Dicuss MRI results         HPI:  Christa is a 82 y.o. year old     Previous visit  Fall, dizziness  01/07/2022-fell on back, sent to emergency department  Reports a pallet carrying boxes was being moved and some boxes fell off, struck the patient and caused a fall   Fell while in [place], struck her head and did lose consciousness for several minutes per daughter who was present  Evaluated at [facility] and discharged, "CT Head was normal"   Today, reports still having " headache and dizziness  Also reports decreased visual acuity in right eye and neck stiffness   Increased neuropathy type tingling to bilateral lower extremities, more so that before   Though process a little foggy after the fall per patient's kids   + Emotional lability     Today  Ordered MRI in response to above complaint, MRI was unremarkable, no anatomical pathology appreciated  Suspected cause of symptoms was severe concussion  We did recommend Tylenol to take 3 times daily as needed  We also ordered home health physical therapy to aid with gait instability, concussion recovery  Reports improvement in lower ext strength, modified home      Sinus complains   +cough, sore throat , sinus HA  Ran out of Flonase       Decreased visual acuity   Hitting more curbs while driving   Previously seeing [facility]       Hip Pain   + history of BHAVIK about 15 years ago  Pt concerned about hardware loosening, feels like hip coming out of socket when putting seat belt on           Past Medical History:  Past Medical History:   Diagnosis Date    Cataract     OD       Past Surgical History:  Past Surgical History:   Procedure Laterality Date    CATARACT EXTRACTION Left        Family History:  No family history on file.    Social History:  Social History     Socioeconomic History    Marital status:    Tobacco Use    Smoking status: Former Smoker     Quit date: 2004     Years since quittin.2    Smokeless tobacco: Never Used       Medications:  Current Outpatient Medications on File Prior to Visit   Medication Sig Dispense Refill    cholecalciferol, vitamin D3, (VITAMIN D3) 125 mcg (5,000 unit) Tab Take 1 tablet (5,000 Units total) by mouth once daily. 90 tablet 3    fluticasone propionate (FLONASE) 50 mcg/actuation nasal spray 2 sprays (100 mcg total) by Each Nostril route once daily. 16 g 11    loratadine (CLARITIN) 10 mg tablet Take 1 tablet (10 mg total) by mouth once daily. 30 tablet 2    polyethylene  "glycol (GLYCOLAX) 17 gram PwPk Take 17 g by mouth once daily at 6am.      ergocalciferol (ERGOCALCIFEROL) 50,000 unit Cap Take 1 capsule (50,000 Units total) by mouth every 7 days. 12 capsule 4    ketorolac 0.5% (ACULAR) 0.5 % Drop 1 drop.      moxifloxacin (VIGAMOX) 0.5 % ophthalmic solution       pantoprazole (PROTONIX) 40 MG tablet Take 1 tablet (40 mg total) by mouth once daily. (Patient not taking: Reported on 2022) 30 tablet 11    prednisoLONE acetate (PRED FORTE) 1 % DrpS       [DISCONTINUED] alprazolam (XANAX) 0.5 MG tablet Take 1 tablet (0.5 mg total) by mouth 2 (two) times daily as needed for Anxiety. (Patient not taking: Reported on 2022) 60 tablet 0     No current facility-administered medications on file prior to visit.       Allergies:  Metal staples [surgical stainless steel], Penicillins, and Levofloxacin    Immunizations:  Immunization History   Administered Date(s) Administered    COVID-19, MRNA, LN-S, PF (MODERNA FULL 0.5 ML DOSE) 2021, 2021    Tdap 05/15/2019       Review of Systems:  Review of Systems   All other systems reviewed and are negative.      Objective:    Vitals:  Vitals:    22 1151   BP: 132/62   Pulse: 84   Resp: 14   Temp: 98.8 °F (37.1 °C)   TempSrc: Oral   Weight: 80.4 kg (177 lb 4 oz)   Height: 5' 3" (1.6 m)   PainSc: 0-No pain       Physical Exam  Vitals reviewed.   Constitutional:       General: She is not in acute distress.     Appearance: She is well-developed.   HENT:      Head: Normocephalic and atraumatic.   Pulmonary:      Effort: Pulmonary effort is normal. No respiratory distress.   Musculoskeletal:      Cervical back: Normal range of motion.      Right lower le+ Edema present.      Left lower le+ Edema present.   Psychiatric:         Behavior: Behavior normal.         Thought Content: Thought content normal.         Judgment: Judgment normal.             Jonah Stone MD  Family Medicine      "

## 2022-04-01 NOTE — TELEPHONE ENCOUNTER
----- Message from Gabe Zarate sent at 4/1/2022  2:46 PM CDT -----  Contact: Teresa  Who Called: PT  Regarding: Teresa with [facility] in regards to the order for Lymphedema, stating they don't have that specialty, but they do have OT. They are requesting for an additional order for OT to be added so the pt can be admitted. Please contact Teresa for additional information.   Would the patient rather a call back or a response via [facility]? Call back  Best Call Back Number: [phone]  Additional Information:

## 2022-04-04 ENCOUNTER — TELEPHONE (OUTPATIENT)
Dept: FAMILY MEDICINE | Facility: CLINIC | Age: 82
End: 2022-04-04
Payer: MEDICARE

## 2022-04-04 DIAGNOSIS — R60.0 PERIPHERAL EDEMA: Primary | ICD-10-CM

## 2022-04-04 NOTE — TELEPHONE ENCOUNTER
----- Message from Jessica Oliver sent at 4/1/2022  4:15 PM CDT -----  Regarding: nurse called  Name of Who is Calling: ALIYAH KEY [[id number]] Teresa([facility])      What is the request in detail: they need you to add occupational therapy for lymphedema to the patents order because they do not just specialize in lymphedema. Please advise       Can the clinic reply by [facility]: NO      What [facility]: [phone] fax # [phone]

## 2022-04-07 PROCEDURE — G0180 MD CERTIFICATION HHA PATIENT: HCPCS | Mod: ,,, | Performed by: FAMILY MEDICINE

## 2022-04-07 PROCEDURE — G0180 PR HOME HEALTH MD CERTIFICATION: ICD-10-PCS | Mod: ,,, | Performed by: FAMILY MEDICINE

## 2022-04-19 ENCOUNTER — EXTERNAL HOME HEALTH (OUTPATIENT)
Dept: HOME HEALTH SERVICES | Facility: HOSPITAL | Age: 82
End: 2022-04-19
Payer: MEDICARE

## 2022-04-25 ENCOUNTER — DOCUMENT SCAN (OUTPATIENT)
Dept: HOME HEALTH SERVICES | Facility: HOSPITAL | Age: 82
End: 2022-04-25
Payer: MEDICARE

## 2022-05-17 ENCOUNTER — OFFICE VISIT (OUTPATIENT)
Dept: FAMILY MEDICINE | Facility: CLINIC | Age: 82
End: 2022-05-17
Payer: MEDICARE

## 2022-05-17 VITALS
SYSTOLIC BLOOD PRESSURE: 118 MMHG | HEIGHT: 63 IN | BODY MASS INDEX: 31.25 KG/M2 | HEART RATE: 80 BPM | OXYGEN SATURATION: 95 % | DIASTOLIC BLOOD PRESSURE: 76 MMHG | WEIGHT: 176.38 LBS | RESPIRATION RATE: 17 BRPM

## 2022-05-17 DIAGNOSIS — M54.41 CHRONIC BILATERAL LOW BACK PAIN WITH BILATERAL SCIATICA: ICD-10-CM

## 2022-05-17 DIAGNOSIS — R51.9 FRONTAL HEADACHE: ICD-10-CM

## 2022-05-17 DIAGNOSIS — M54.42 CHRONIC BILATERAL LOW BACK PAIN WITH BILATERAL SCIATICA: ICD-10-CM

## 2022-05-17 DIAGNOSIS — R60.0 PERIPHERAL EDEMA: Primary | ICD-10-CM

## 2022-05-17 DIAGNOSIS — G89.29 CHRONIC BILATERAL LOW BACK PAIN WITH BILATERAL SCIATICA: ICD-10-CM

## 2022-05-17 PROCEDURE — 99214 OFFICE O/P EST MOD 30 MIN: CPT | Mod: S$PBB,,, | Performed by: FAMILY MEDICINE

## 2022-05-17 PROCEDURE — 99214 PR OFFICE/OUTPT VISIT, EST, LEVL IV, 30-39 MIN: ICD-10-PCS | Mod: S$PBB,,, | Performed by: FAMILY MEDICINE

## 2022-05-17 PROCEDURE — 99215 OFFICE O/P EST HI 40 MIN: CPT | Mod: PBBFAC,PN | Performed by: FAMILY MEDICINE

## 2022-05-17 PROCEDURE — 99999 PR PBB SHADOW E&M-EST. PATIENT-LVL V: ICD-10-PCS | Mod: PBBFAC,,, | Performed by: FAMILY MEDICINE

## 2022-05-17 PROCEDURE — 99999 PR PBB SHADOW E&M-EST. PATIENT-LVL V: CPT | Mod: PBBFAC,,, | Performed by: FAMILY MEDICINE

## 2022-05-17 NOTE — PROGRESS NOTES
THIS DOCUMENT WAS MADE IN PART WITH VOICE RECOGNITION SOFTWARE.  OCCASIONALLY THIS SOFTWARE WILL MISINTERPRET WORDS OR PHRASES.    Assessment and Plan:    1. Peripheral edema  Ambulatory referral/consult to Physical/Occupational Therapy   2. Frontal headache     3. Chronic bilateral low back pain with bilateral sciatica  Ambulatory referral/consult to Pain Clinic       PLAN    Frontal headache improved, suspect sinus headache.  Continue Flonase, and nasal saline spray  MRI negative    Refer to outpatient [facility]ymphedema clinic with [facility]    Refer to pain clinic per patient request for chronic back pain    Patient will start overactive bladder medication if she feels like she needs to      ______________________________________________________________________  Subjective:    Chief Complaint:  Chief Complaint   Patient presents with    Follow-up    Leg Pain     Bilateral ankle swelling. Slight pain that comes and goes.    Headache     Still present but not as bad. Thinks it may be sinus related        HPI:  Christa is a 82 y.o. year old     Daughter Present at visit       OAB   Prescribed Myrbetriq at prior visit, never started 2/2 concerns of polypharmacy   Pt content with changing pads for now     Chronic lower extremity edema  Ordered lymphedema therapy via home health at prior visit, patient did like the person she spoke with about home health so she declined further evaluation and treatment  Has tried elevating legs w/o much success     Headaches  Suspected sinus headaches  Prescribed Flonase at prior visit  MRI brain Feb 2022 unremarkable (done after fall in [employer] with concussion)     Lower back / Sciatica   Request pain mgt referral   X-ray from 2019 shows facet arthrosis, degenerative disc          Past Medical History:  Past Medical History:   Diagnosis Date    Cataract     OD       Past Surgical History:  Past Surgical History:   Procedure Laterality Date    CATARACT EXTRACTION Left        Family  "History:  No family history on file.    Social History:  Social History     Socioeconomic History    Marital status:    Tobacco Use    Smoking status: Former Smoker     Quit date: 2004     Years since quittin.3    Smokeless tobacco: Never Used       Medications:  Current Outpatient Medications on File Prior to Visit   Medication Sig Dispense Refill    cholecalciferol, vitamin D3, (VITAMIN D3) 125 mcg (5,000 unit) Tab Take 1 tablet (5,000 Units total) by mouth once daily. 90 tablet 3    fluticasone propionate (FLONASE) 50 mcg/actuation nasal spray 2 sprays (100 mcg total) by Each Nostril route once daily. 54 g 3    loratadine (CLARITIN) 10 mg tablet Take 1 tablet (10 mg total) by mouth once daily. 30 tablet 2    mirabegron (MYRBETRIQ) 25 mg Tb24 ER tablet Take 1 tablet (25 mg total) by mouth once daily. 30 tablet 11    pantoprazole (PROTONIX) 40 MG tablet Take 1 tablet (40 mg total) by mouth once daily. 30 tablet 11    polyethylene glycol (GLYCOLAX) 17 gram PwPk Take 17 g by mouth once daily at 6am.      prednisoLONE acetate (PRED FORTE) 1 % DrpS        No current facility-administered medications on file prior to visit.       Allergies:  Metal staples [surgical stainless steel], Penicillins, and Levofloxacin    Immunizations:  Immunization History   Administered Date(s) Administered    COVID-19, MRNA, LN-S, PF (MODERNA FULL 0.5 ML DOSE) 2021, 2021    Tdap 05/15/2019       Review of Systems:  Review of Systems   All other systems reviewed and are negative.      Objective:    Vitals:  Vitals:    22 1036   Resp: 17   Weight: 80 kg (176 lb 5.9 oz)   Height: 5' 3" (1.6 m)   PainSc:   2   PainLoc: Hip       Physical Exam  Vitals reviewed.   Constitutional:       General: She is not in acute distress.     Appearance: She is well-developed.   HENT:      Head: Normocephalic and atraumatic.   Pulmonary:      Effort: Pulmonary effort is normal. No respiratory distress. "   Musculoskeletal:      Cervical back: Normal range of motion.      Right lower le+ Pitting Edema present.      Left lower le+ Pitting Edema present.   Psychiatric:         Behavior: Behavior normal.         Thought Content: Thought content normal.         Judgment: Judgment normal.             Jonah Stone MD  Family Medicine

## 2022-06-14 ENCOUNTER — PES CALL (OUTPATIENT)
Dept: ADMINISTRATIVE | Facility: CLINIC | Age: 82
End: 2022-06-14
Payer: MEDICARE

## 2022-06-20 ENCOUNTER — DOCUMENT SCAN (OUTPATIENT)
Dept: HOME HEALTH SERVICES | Facility: HOSPITAL | Age: 82
End: 2022-06-20
Payer: MEDICARE

## 2022-06-21 ENCOUNTER — OFFICE VISIT (OUTPATIENT)
Dept: OPHTHALMOLOGY | Facility: CLINIC | Age: 82
End: 2022-06-21
Payer: MEDICARE

## 2022-06-21 DIAGNOSIS — Z96.1 PSEUDOPHAKIA OF LEFT EYE: ICD-10-CM

## 2022-06-21 DIAGNOSIS — H53.8 BLURRED VISION: ICD-10-CM

## 2022-06-21 DIAGNOSIS — H25.11 AGE-RELATED NUCLEAR CATARACT OF RIGHT EYE: Primary | ICD-10-CM

## 2022-06-21 DIAGNOSIS — H40.023 OPEN ANGLE WITH BORDERLINE FINDINGS AND HIGH GLAUCOMA RISK IN BOTH EYES: ICD-10-CM

## 2022-06-21 PROCEDURE — 99204 PR OFFICE/OUTPT VISIT, NEW, LEVL IV, 45-59 MIN: ICD-10-PCS | Mod: S$PBB,,, | Performed by: OPHTHALMOLOGY

## 2022-06-21 PROCEDURE — 99999 PR PBB SHADOW E&M-EST. PATIENT-LVL II: CPT | Mod: PBBFAC,,, | Performed by: OPHTHALMOLOGY

## 2022-06-21 PROCEDURE — 99204 OFFICE O/P NEW MOD 45 MIN: CPT | Mod: S$PBB,,, | Performed by: OPHTHALMOLOGY

## 2022-06-21 PROCEDURE — 99212 OFFICE O/P EST SF 10 MIN: CPT | Mod: PBBFAC,PO | Performed by: OPHTHALMOLOGY

## 2022-06-21 PROCEDURE — 99999 PR PBB SHADOW E&M-EST. PATIENT-LVL II: ICD-10-PCS | Mod: PBBFAC,,, | Performed by: OPHTHALMOLOGY

## 2022-06-21 NOTE — PROGRESS NOTES
HPI     New pt here for blurry VA. LDE- 1 year    Pt sts VA OU is blurry. Pt was told she has a cataract OD. Pt sts she has   a hard time focusing. Pt sts she has had flashes of light and floaters OU   x years. Pt denies pain. Pt denies use of gtt.     Last edited by Marcella Toth on 6/21/2022  2:12 PM. (History)        ROS     Negative for: Constitutional, Gastrointestinal, Neurological, Skin,   Genitourinary, Musculoskeletal, HENT, Endocrine, Cardiovascular, Eyes,   Respiratory, Psychiatric, Allergic/Imm, Heme/Lymph    Last edited by Dhruv Vasquez Jr., MD on 6/21/2022  2:48 PM. (History)        Assessment /Plan     For exam results, see Encounter Report.    Age-related nuclear cataract of right eye    Open angle with borderline findings and high glaucoma risk in both eyes    Pseudophakia of left eye    Blurred vision  -     Ambulatory referral/consult to Ophthalmology      -schedule cataract surgery, right  -RBA discussed with patient  -Risks of worse vision, loss of vision, infection, bleeding, re-surgery,and may need to have surgery done by a different physician was explained to the patient  -patient was also counseled on premium lens options, laser cataract, and astigmatic correction  -patient was also counseled that they may still need glasses after surgery to help improve their vision, especially the need for reading glasses for reading small print texts after surgery  -patient understood the risks involved with cataract surgery and wanted to move forward with surgery  -schedule HVF, OCT ON, gonio, and pacy  Follow up in about 1 month (around 7/21/2022) for Measurements, H&P, and consents.

## 2022-07-11 ENCOUNTER — TELEPHONE (OUTPATIENT)
Dept: OPHTHALMOLOGY | Facility: CLINIC | Age: 82
End: 2022-07-11
Payer: MEDICARE

## 2022-07-11 NOTE — TELEPHONE ENCOUNTER
Left message for pt to return call to schedule her cataract sx with Dr Vasquez. Left message on cell ph, home ph not in service.

## 2022-07-14 ENCOUNTER — TELEPHONE (OUTPATIENT)
Dept: OPHTHALMOLOGY | Facility: CLINIC | Age: 82
End: 2022-07-14
Payer: MEDICARE

## 2022-07-21 ENCOUNTER — DOCUMENT SCAN (OUTPATIENT)
Dept: HOME HEALTH SERVICES | Facility: HOSPITAL | Age: 82
End: 2022-07-21
Payer: MEDICARE

## 2022-07-29 ENCOUNTER — TELEPHONE (OUTPATIENT)
Dept: FAMILY MEDICINE | Facility: CLINIC | Age: 82
End: 2022-07-29
Payer: MEDICARE

## 2022-07-29 NOTE — TELEPHONE ENCOUNTER
----- Message from Fang Garcia sent at 7/29/2022 11:27 AM CDT -----  Type:  Patient Call Back    Who Called: Pt     What is the reqeust in detail: Pt is requesting a call back in regards to reschedule her 4 mon appt to some time in august, no solution was found. Please Advise     Can the clinic reply by [facility]?    Best Call Back Number:[phone]

## 2022-08-16 ENCOUNTER — TELEPHONE (OUTPATIENT)
Dept: FAMILY MEDICINE | Facility: CLINIC | Age: 82
End: 2022-08-16
Payer: MEDICARE

## 2022-08-16 NOTE — TELEPHONE ENCOUNTER
----- Message from Marci Sibley sent at 8/16/2022  8:55 AM CDT -----  Type:  Patient Returning Call    Who Called: pt     Who Left Message for Patient:Kalani Sheikh LPN    Does the patient know what this is regarding?:yes a sooner appointment     Would the patient rather a call back or a response via [facility]? Call back     Best Call Back Number: (mobile) [phone]             Additional Information:  Pt thinks she may have a UTI

## 2022-08-24 ENCOUNTER — TELEPHONE (OUTPATIENT)
Dept: FAMILY MEDICINE | Facility: CLINIC | Age: 82
End: 2022-08-24
Payer: MEDICARE

## 2022-08-24 DIAGNOSIS — M25.552 LEFT HIP PAIN: Primary | ICD-10-CM

## 2022-08-24 NOTE — TELEPHONE ENCOUNTER
Spoke with pt. Requesting orders for L hip x-ray to have done prior to appt due to pain. Please advise.

## 2022-08-24 NOTE — TELEPHONE ENCOUNTER
----- Message from Galina Madi sent at 8/24/2022  9:10 AM CDT -----  .Type:  Patient Call Back    Who Called: PT       Does the patient know what this is regarding?: PT WOULD LIKE TO GET AN XRAY OF HER HIP THE DAY OF HER APPOINTMENT PLEASE REACH OUT TO HER      Would the patient rather a call back YES     Best Call Back Number: [phone]    Additional Information: Thank You

## 2022-08-24 NOTE — TELEPHONE ENCOUNTER
----- Message from Evens May sent at 8/24/2022  3:24 PM CDT -----  Contact: pt at [phone]  Type:  Patient Returning Call    Who Called:  pt  Who Left Message for Patient:  Kalani  Does the patient know what this is regarding?:  yes  Best Call Back Number:  [phone]  Additional Information:  pt is calling the office to return a call back to Kalani. Please call back and advise.  SHE STATES IT'S VERY IMPORTANT SHE TALKS TO YOU REGARDING HER X-RAYS ON FRIDAY

## 2022-08-25 ENCOUNTER — HOSPITAL ENCOUNTER (OUTPATIENT)
Dept: RADIOLOGY | Facility: HOSPITAL | Age: 82
Discharge: HOME | End: 2022-08-25
Attending: FAMILY MEDICINE
Payer: MEDICARE

## 2022-08-25 DIAGNOSIS — M25.552 LEFT HIP PAIN: ICD-10-CM

## 2022-08-25 PROCEDURE — 73502 X-RAY EXAM HIP UNI 2-3 VIEWS: CPT | Mod: 26,RT,, | Performed by: RADIOLOGY

## 2022-08-25 PROCEDURE — 73502 X-RAY EXAM HIP UNI 2-3 VIEWS: CPT | Mod: TC,FY,PO,RT

## 2022-08-25 PROCEDURE — 73502 XR HIP WITH PELVIS WHEN PERFORMED, 2 OR 3  VIEWS RIGHT: ICD-10-PCS | Mod: 26,RT,, | Performed by: RADIOLOGY

## 2022-08-25 NOTE — TELEPHONE ENCOUNTER
Spoke with pt and notified of orders. Pt had xray done this AM.   Advised results will be reviewed at appt. Pt verbalized understanding.

## 2022-08-26 ENCOUNTER — OFFICE VISIT (OUTPATIENT)
Dept: FAMILY MEDICINE | Facility: CLINIC | Age: 82
End: 2022-08-26
Payer: MEDICARE

## 2022-08-26 VITALS
BODY MASS INDEX: 31.36 KG/M2 | HEIGHT: 63 IN | SYSTOLIC BLOOD PRESSURE: 118 MMHG | WEIGHT: 177 LBS | DIASTOLIC BLOOD PRESSURE: 68 MMHG

## 2022-08-26 DIAGNOSIS — M25.559 HIP PAIN: Primary | ICD-10-CM

## 2022-08-26 DIAGNOSIS — Z23 IMMUNIZATION DUE: Primary | ICD-10-CM

## 2022-08-26 DIAGNOSIS — E55.9 VITAMIN D INSUFFICIENCY: ICD-10-CM

## 2022-08-26 DIAGNOSIS — Z79.899 DRUG THERAPY: ICD-10-CM

## 2022-08-26 DIAGNOSIS — I99.8 VASCULAR INSUFFICIENCY: ICD-10-CM

## 2022-08-26 DIAGNOSIS — N32.81 OAB (OVERACTIVE BLADDER): ICD-10-CM

## 2022-08-26 DIAGNOSIS — M85.80 OSTEOPENIA, UNSPECIFIED LOCATION: ICD-10-CM

## 2022-08-26 DIAGNOSIS — J32.9 CHRONIC SINUSITIS, UNSPECIFIED LOCATION: ICD-10-CM

## 2022-08-26 DIAGNOSIS — M25.551 RIGHT HIP PAIN: ICD-10-CM

## 2022-08-26 PROCEDURE — 99213 OFFICE O/P EST LOW 20 MIN: CPT | Mod: PBBFAC,PN | Performed by: FAMILY MEDICINE

## 2022-08-26 PROCEDURE — 99214 OFFICE O/P EST MOD 30 MIN: CPT | Mod: S$PBB,,, | Performed by: FAMILY MEDICINE

## 2022-08-26 PROCEDURE — 99999 PR PBB SHADOW E&M-EST. PATIENT-LVL III: CPT | Mod: PBBFAC,,, | Performed by: FAMILY MEDICINE

## 2022-08-26 PROCEDURE — 99999 PR PBB SHADOW E&M-EST. PATIENT-LVL III: ICD-10-PCS | Mod: PBBFAC,,, | Performed by: FAMILY MEDICINE

## 2022-08-26 PROCEDURE — 99214 PR OFFICE/OUTPT VISIT, EST, LEVL IV, 30-39 MIN: ICD-10-PCS | Mod: S$PBB,,, | Performed by: FAMILY MEDICINE

## 2022-08-26 RX ORDER — CALCIUM CARBONATE 200(500)MG
1 TABLET,CHEWABLE ORAL DAILY
COMMUNITY

## 2022-08-26 NOTE — PROGRESS NOTES
THIS DOCUMENT WAS MADE IN PART WITH VOICE RECOGNITION SOFTWARE.  OCCASIONALLY THIS SOFTWARE WILL MISINTERPRET WORDS OR PHRASES.    Assessment and Plan:    1. Immunization due     2. Chronic sinusitis, unspecified location     3. Osteopenia, unspecified location     4. Vitamin D insufficiency     5. Vascular insufficiency     6. OAB (overactive bladder)     7. Drug therapy  CBC Auto Differential    Comprehensive Metabolic Panel    Hemoglobin A1C    TSH    T4, Free    Urinalysis, Reflex to Urine Culture Urine, Clean Catch   8. Right hip pain       Referral to ortho for evaluation of hip pain with ipsilateral history of total hip arthroplasty, continue physical therapy    Wellness labs as above     Other health conditions appear stable      ______________________________________________________________________  Subjective:    Chief Complaint:  Chief Complaint   Patient presents with    Follow-up        HPI:  Christa is a 82 y.o. year old     Rt Hip pain associated with recent falls   + history of Rt BHAVIK many years ago   Working with PT, pt really enjoys PT   Helping a lot with hip pain and balance             Overactive bladder   Rx- Myrbetriq 25 mg     Allergic rhinitis   Rx-Claritin 10 mg, Flonase     Nicotine dependence   In remission-quit in 2004    Vascular insufficiency     Heartburn   Tums as needed     Osteopenia, vitamin-D deficiency  + Fall risk   Taking 5000 units daily vitamin-D, 500 mg calcium daily  No recent fractures    Past Medical History:  Past Medical History:   Diagnosis Date    Cataract     OD       Past Surgical History:  Past Surgical History:   Procedure Laterality Date    CATARACT EXTRACTION W/  INTRAOCULAR LENS IMPLANT Left 09/27/2017    Dr. Nelson       Family History:  Family History   Problem Relation Age of Onset    Glaucoma Neg Hx     Macular degeneration Neg Hx        Social History:  Social History     Socioeconomic History    Marital status:    Tobacco Use     "Smoking status: Former Smoker     Quit date: 2004     Years since quittin.6    Smokeless tobacco: Never Used       Medications:  Current Outpatient Medications on File Prior to Visit   Medication Sig Dispense Refill    calcium carbonate (TUMS) 200 mg calcium (500 mg) chewable tablet Take 1 tablet by mouth once daily.      cholecalciferol, vitamin D3, (VITAMIN D3) 125 mcg (5,000 unit) Tab Take 1 tablet (5,000 Units total) by mouth once daily. 90 tablet 3    diphenhydrAMINE (BENADRYL) spray Apply topically every 4 (four) hours as needed for Itching. 1 each 0    fluticasone propionate (FLONASE) 50 mcg/actuation nasal spray 2 sprays (100 mcg total) by Each Nostril route once daily. 54 g 3    loratadine (CLARITIN) 10 mg tablet Take 1 tablet (10 mg total) by mouth once daily. 30 tablet 2    polyethylene glycol (GLYCOLAX) 17 gram PwPk Take 17 g by mouth once daily at 6am.      prednisoLONE acetate (PRED FORTE) 1 % DrpS       mirabegron (MYRBETRIQ) 25 mg Tb24 ER tablet Take 1 tablet (25 mg total) by mouth once daily. (Patient not taking: Reported on 2022) 30 tablet 11    [DISCONTINUED] pantoprazole (PROTONIX) 40 MG tablet Take 1 tablet (40 mg total) by mouth once daily. 30 tablet 11     No current facility-administered medications on file prior to visit.       Allergies:  Metal staples [surgical stainless steel], Penicillins, and Levofloxacin    Immunizations:  Immunization History   Administered Date(s) Administered    COVID-19, MRNA, LN-S, PF (MODERNA FULL 0.5 ML DOSE) 2021, 2021    Tdap 05/15/2019       Review of Systems:  Review of Systems   All other systems reviewed and are negative.      Objective:    Vitals:  Vitals:    22 1353   BP: 118/68   Weight: 80.3 kg (177 lb 0.5 oz)   Height: 5' 3" (1.6 m)   PainSc: 0-No pain       Physical Exam  Vitals reviewed.   Constitutional:       General: She is not in acute distress.  HENT:      Head: Normocephalic and atraumatic.   Eyes: "      Pupils: Pupils are equal, round, and reactive to light.   Cardiovascular:      Rate and Rhythm: Normal rate and regular rhythm.      Heart sounds: No murmur heard.    No friction rub.   Pulmonary:      Effort: Pulmonary effort is normal.      Breath sounds: Normal breath sounds.   Abdominal:      General: Bowel sounds are normal. There is no distension.      Palpations: Abdomen is soft.      Tenderness: There is no abdominal tenderness.   Musculoskeletal:      Cervical back: Neck supple.   Skin:     General: Skin is warm and dry.      Findings: No rash.   Psychiatric:         Behavior: Behavior normal.             Jonah Stone MD  Family Medicine

## 2022-08-29 ENCOUNTER — TELEPHONE (OUTPATIENT)
Dept: FAMILY MEDICINE | Facility: CLINIC | Age: 82
End: 2022-08-29
Payer: MEDICARE

## 2022-08-29 DIAGNOSIS — N30.00 ACUTE INFECTIVE CYSTITIS: Primary | ICD-10-CM

## 2022-08-29 RX ORDER — NITROFURANTOIN 25; 75 MG/1; MG/1
100 CAPSULE ORAL 2 TIMES DAILY
Qty: 10 CAPSULE | Refills: 0 | Status: SHIPPED | OUTPATIENT
Start: 2022-08-29 | End: 2022-08-29

## 2022-08-29 RX ORDER — CIPROFLOXACIN 500 MG/1
500 TABLET ORAL 2 TIMES DAILY
Qty: 10 TABLET | Refills: 0 | Status: SHIPPED | OUTPATIENT
Start: 2022-08-29 | End: 2022-11-15

## 2022-08-29 NOTE — TELEPHONE ENCOUNTER
----- Message from Evens Young sent at 8/29/2022  8:00 AM CDT -----  Contact: pt at  [phone]  Type: Needs Medical Advice  Who Called:  pt  Best Call Back Number: [phone]  Additional Information: pt is calling the office to request a call back from the megan dorantesding possible Bladder Infection. Please call back and advise.

## 2022-08-29 NOTE — TELEPHONE ENCOUNTER
"Spoke w/ pt. Advised this has been sent in. Pt does not want to take macrobid. She states "Cipro always works". She confirmed that it has been many years since she took it, but it worked and she is nervous to take anything else due to previous SE's to antibiotics. If Ok, please change rx, unless there's a reason she should try the macrobid.  "

## 2022-08-29 NOTE — TELEPHONE ENCOUNTER
Pt stopped by to find out if hse needs to be on antibiotics for a UTI. Please review results from Fri and advise. Pt aware we will call her w/ recommendation.    [phone] or [phone]

## 2022-09-15 ENCOUNTER — OFFICE VISIT (OUTPATIENT)
Dept: FAMILY MEDICINE | Facility: CLINIC | Age: 82
End: 2022-09-15
Payer: MEDICARE

## 2022-09-15 VITALS
BODY MASS INDEX: 31.14 KG/M2 | OXYGEN SATURATION: 96 % | HEART RATE: 82 BPM | DIASTOLIC BLOOD PRESSURE: 72 MMHG | WEIGHT: 175.81 LBS | SYSTOLIC BLOOD PRESSURE: 132 MMHG | RESPIRATION RATE: 12 BRPM

## 2022-09-15 DIAGNOSIS — F41.9 ANXIETY: ICD-10-CM

## 2022-09-15 DIAGNOSIS — M70.61 GREATER TROCHANTERIC BURSITIS, RIGHT: Primary | ICD-10-CM

## 2022-09-15 DIAGNOSIS — B37.2 CANDIDAL SKIN INFECTION: ICD-10-CM

## 2022-09-15 PROCEDURE — 99999 PR PBB SHADOW E&M-EST. PATIENT-LVL III: CPT | Mod: PBBFAC,,, | Performed by: FAMILY MEDICINE

## 2022-09-15 PROCEDURE — 99214 OFFICE O/P EST MOD 30 MIN: CPT | Mod: 25,S$PBB,, | Performed by: FAMILY MEDICINE

## 2022-09-15 PROCEDURE — 99999 PR PBB SHADOW E&M-EST. PATIENT-LVL III: ICD-10-PCS | Mod: PBBFAC,,, | Performed by: FAMILY MEDICINE

## 2022-09-15 PROCEDURE — 99214 PR OFFICE/OUTPT VISIT, EST, LEVL IV, 30-39 MIN: ICD-10-PCS | Mod: 25,S$PBB,, | Performed by: FAMILY MEDICINE

## 2022-09-15 PROCEDURE — 99213 OFFICE O/P EST LOW 20 MIN: CPT | Mod: PBBFAC,PN | Performed by: FAMILY MEDICINE

## 2022-09-15 PROCEDURE — 20610 LARGE JOINT ASPIRATION/INJECTION: L GREATER TROCHANTERIC BURSA: ICD-10-PCS | Mod: S$PBB,RT,, | Performed by: FAMILY MEDICINE

## 2022-09-15 PROCEDURE — 20610 DRAIN/INJ JOINT/BURSA W/O US: CPT | Mod: PBBFAC,PN | Performed by: FAMILY MEDICINE

## 2022-09-15 RX ORDER — AZELASTINE 1 MG/ML
SPRAY, METERED NASAL
COMMUNITY
Start: 2022-08-10 | End: 2022-11-15

## 2022-09-15 RX ORDER — ALPRAZOLAM 0.25 MG/1
0.25 TABLET ORAL DAILY PRN
Qty: 30 TABLET | Refills: 0 | Status: SHIPPED | OUTPATIENT
Start: 2022-09-15 | End: 2023-01-19

## 2022-09-15 RX ORDER — MELOXICAM 15 MG/1
15 TABLET ORAL DAILY
COMMUNITY
Start: 2022-09-14 | End: 2022-11-15

## 2022-09-15 RX ORDER — NYSTATIN 100000 [USP'U]/G
POWDER TOPICAL 2 TIMES DAILY
Qty: 60 G | Refills: 2 | Status: SHIPPED | OUTPATIENT
Start: 2022-09-15

## 2022-09-15 RX ORDER — BETAMETHASONE SODIUM PHOSPHATE AND BETAMETHASONE ACETATE 3; 3 MG/ML; MG/ML
3 INJECTION, SUSPENSION INTRA-ARTICULAR; INTRALESIONAL; INTRAMUSCULAR; SOFT TISSUE ONCE
Status: COMPLETED | OUTPATIENT
Start: 2022-09-15 | End: 2022-09-15

## 2022-09-15 RX ADMIN — BETAMETHASONE SODIUM PHOSPHATE AND BETAMETHASONE ACETATE 3 MG: 3; 3 INJECTION, SUSPENSION INTRA-ARTICULAR; INTRALESIONAL; INTRAMUSCULAR at 04:09

## 2022-09-15 NOTE — PROGRESS NOTES
THIS DOCUMENT WAS MADE IN PART WITH VOICE RECOGNITION SOFTWARE.  OCCASIONALLY THIS SOFTWARE WILL MISINTERPRET WORDS OR PHRASES.    Assessment and Plan:    1. Greater trochanteric bursitis, right  betamethasone acetate-betamethasone sodium phosphate injection 3 mg    Large Joint Aspiration/Injection: L greater trochanteric bursa      2. Anxiety  ALPRAZolam (XANAX) 0.25 MG tablet      3. Candidal skin infection  nystatin (MYCOSTATIN) powder          PLAN    Procedure done, patient did note significant relief following procedure     Low-dose Xanax to use as needed for anxiety symptoms     Nystatin powder for candidal rash underneath breast tissue      ______________________________________________________________________  Subjective:    Chief Complaint:  Chief Complaint   Patient presents with    Follow-up        HPI:  Christa is a 82 y.o. year old     Right hip pain   Referred to orthopedist for hip pain on the ipsilateral side as total hip replacement   Patient was diagnosed with trochanteric bursitis, offered injection but patient deferred, concerned about possible allergic reaction to steroid despite never having had any allergic reaction to steroid     Patient also complains of fungal rash underneath breast tissue   Previously managed with Lotrisone by urgent care provider     Also requesting low-dose p.r.n. Xanax which is use in the past for intermittent anxiety symptoms      Overactive bladder   Nocturia 2x nightly     Allergic rhinitis   Rx-Claritin 10 mg, Flonase      Nicotine dependence   In remission-quit in 2004     Vascular insufficiency      Heartburn   Tums as needed      Osteopenia, vitamin-D deficiency  + Fall risk   Taking 5000 units daily vitamin-D, 500 mg calcium daily  No recent fractures    Past Medical History:  Past Medical History:   Diagnosis Date    Cataract     OD       Past Surgical History:  Past Surgical History:   Procedure Laterality Date    CATARACT EXTRACTION W/  INTRAOCULAR LENS  IMPLANT Left 2017    Dr. Nelson       Family History:  Family History   Problem Relation Age of Onset    Glaucoma Neg Hx     Macular degeneration Neg Hx        Social History:  Social History     Socioeconomic History    Marital status:    Tobacco Use    Smoking status: Former     Types: Cigarettes     Quit date: 2004     Years since quittin.7    Smokeless tobacco: Never       Medications:  Current Outpatient Medications on File Prior to Visit   Medication Sig Dispense Refill    azelastine (ASTELIN) 137 mcg (0.1 %) nasal spray SMARTSIG:Both Nares      calcium carbonate (TUMS) 200 mg calcium (500 mg) chewable tablet Take 1 tablet by mouth once daily.      cholecalciferol, vitamin D3, (VITAMIN D3) 125 mcg (5,000 unit) Tab Take 1 tablet (5,000 Units total) by mouth once daily. 90 tablet 3    ciprofloxacin HCl (CIPRO) 500 MG tablet Take 1 tablet (500 mg total) by mouth 2 (two) times daily. 10 tablet 0    diphenhydrAMINE (BENADRYL) spray Apply topically every 4 (four) hours as needed for Itching. 1 each 0    fluticasone propionate (FLONASE) 50 mcg/actuation nasal spray 2 sprays (100 mcg total) by Each Nostril route once daily. 54 g 3    loratadine (CLARITIN) 10 mg tablet Take 1 tablet (10 mg total) by mouth once daily. 30 tablet 2    meloxicam (MOBIC) 15 MG tablet Take 15 mg by mouth once daily.      mirabegron (MYRBETRIQ) 25 mg Tb24 ER tablet Take 1 tablet (25 mg total) by mouth once daily. 30 tablet 11    polyethylene glycol (GLYCOLAX) 17 gram PwPk Take 17 g by mouth once daily at 6am.      prednisoLONE acetate (PRED FORTE) 1 % DrpS        No current facility-administered medications on file prior to visit.       Allergies:  Metal staples [surgical stainless steel], Penicillins, and Levofloxacin    Immunizations:  Immunization History   Administered Date(s) Administered    COVID-19, MRNA, LN-S, PF (MODERNA FULL 0.5 ML DOSE) 2021, 2021    Tdap 05/15/2019       Review of  Systems:  Review of Systems   All other systems reviewed and are negative.    Objective:    Vitals:  Vitals:    09/15/22 1647   BP: 132/72   Pulse: 82   Resp: 12   SpO2: 96%   Weight: 79.8 kg (175 lb 13.1 oz)   PainSc:   4       Physical Exam  Vitals reviewed.   Constitutional:       General: She is not in acute distress.     Appearance: She is well-developed.   HENT:      Head: Normocephalic and atraumatic.   Pulmonary:      Effort: Pulmonary effort is normal. No respiratory distress.   Musculoskeletal:      Cervical back: Normal range of motion.      Comments: Right hip exquisitely tender to palpation over greater trochanter   Psychiatric:         Behavior: Behavior normal.         Thought Content: Thought content normal.         Judgment: Judgment normal.           Jonah Stone MD  Family Medicine

## 2022-09-16 NOTE — PROCEDURES
Large Joint Aspiration/Injection: L greater trochanteric bursa    Date/Time: 9/15/2022 4:40 PM  Performed by: Jonah Stone MD  Authorized by: Jonah Stone MD     Consent Done?:  Yes (Verbal)  Indications:  Arthritis and pain  Site marked: the procedure site was marked    Timeout: prior to procedure the correct patient, procedure, and site was verified      Local anesthesia used?: Yes    Local anesthetic:  Lidocaine 2% without epinephrine  Anesthetic total (ml):  1    Ultrasonic Guidance for needle placement?: No    Approach:  Lateral  Location:  Hip  Site:  L greater trochanteric bursa  Medications:  3 mg celestone 3mg  Patient tolerance:  Patient tolerated the procedure well with no immediate complications

## 2022-10-20 DIAGNOSIS — U07.1 COVID-19 VIRUS DETECTED: ICD-10-CM

## 2022-10-26 ENCOUNTER — TELEPHONE (OUTPATIENT)
Dept: FAMILY MEDICINE | Facility: CLINIC | Age: 82
End: 2022-10-26
Payer: MEDICARE

## 2022-10-26 NOTE — TELEPHONE ENCOUNTER
----- Message from Lyndsay Patel MA sent at 10/26/2022 10:42 AM CDT -----  [phone]- PLEASE CALL PT REGARDING RECTAL PAIN- THANK YOU

## 2022-11-07 ENCOUNTER — PES CALL (OUTPATIENT)
Dept: ADMINISTRATIVE | Facility: OTHER | Age: 82
End: 2022-11-07
Payer: MEDICARE

## 2022-11-07 ENCOUNTER — TELEPHONE (OUTPATIENT)
Dept: ADMINISTRATIVE | Facility: CLINIC | Age: 82
End: 2022-11-07
Payer: MEDICARE

## 2022-11-15 ENCOUNTER — LAB VISIT (OUTPATIENT)
Dept: LAB | Facility: HOSPITAL | Age: 82
End: 2022-11-15
Attending: FAMILY MEDICINE
Payer: MEDICARE

## 2022-11-15 ENCOUNTER — PES CALL (OUTPATIENT)
Dept: ADMINISTRATIVE | Facility: OTHER | Age: 82
End: 2022-11-15
Payer: MEDICARE

## 2022-11-15 ENCOUNTER — OFFICE VISIT (OUTPATIENT)
Dept: FAMILY MEDICINE | Facility: CLINIC | Age: 82
End: 2022-11-15
Payer: MEDICARE

## 2022-11-15 VITALS
HEIGHT: 63 IN | WEIGHT: 170.5 LBS | HEART RATE: 89 BPM | DIASTOLIC BLOOD PRESSURE: 72 MMHG | RESPIRATION RATE: 14 BRPM | SYSTOLIC BLOOD PRESSURE: 126 MMHG | BODY MASS INDEX: 30.21 KG/M2

## 2022-11-15 DIAGNOSIS — B37.2 CANDIDAL SKIN INFECTION: ICD-10-CM

## 2022-11-15 DIAGNOSIS — Z23 IMMUNIZATION DUE: Primary | ICD-10-CM

## 2022-11-15 DIAGNOSIS — R53.83 FATIGUE, UNSPECIFIED TYPE: ICD-10-CM

## 2022-11-15 DIAGNOSIS — F41.9 ANXIETY: ICD-10-CM

## 2022-11-15 DIAGNOSIS — R26.89 IMBALANCE: ICD-10-CM

## 2022-11-15 DIAGNOSIS — R54 SENILE DEBILITY: ICD-10-CM

## 2022-11-15 DIAGNOSIS — M70.61 GREATER TROCHANTERIC BURSITIS, RIGHT: ICD-10-CM

## 2022-11-15 LAB
BASOPHILS # BLD AUTO: 0.03 K/UL (ref 0–0.2)
BASOPHILS NFR BLD: 0.4 % (ref 0–1.9)
CRP SERPL-MCNC: 1.4 MG/L (ref 0–8.2)
DIFFERENTIAL METHOD: NORMAL
EOSINOPHIL # BLD AUTO: 0.4 K/UL (ref 0–0.5)
EOSINOPHIL NFR BLD: 5.4 % (ref 0–8)
ERYTHROCYTE [DISTWIDTH] IN BLOOD BY AUTOMATED COUNT: 14 % (ref 11.5–14.5)
ERYTHROCYTE [SEDIMENTATION RATE] IN BLOOD BY PHOTOMETRIC METHOD: 45 MM/HR (ref 0–36)
HCT VFR BLD AUTO: 39.4 % (ref 37–48.5)
HGB BLD-MCNC: 12.8 G/DL (ref 12–16)
IMM GRANULOCYTES # BLD AUTO: 0.01 K/UL (ref 0–0.04)
IMM GRANULOCYTES NFR BLD AUTO: 0.1 % (ref 0–0.5)
LYMPHOCYTES # BLD AUTO: 2.4 K/UL (ref 1–4.8)
LYMPHOCYTES NFR BLD: 35.1 % (ref 18–48)
MCH RBC QN AUTO: 28.3 PG (ref 27–31)
MCHC RBC AUTO-ENTMCNC: 32.5 G/DL (ref 32–36)
MCV RBC AUTO: 87 FL (ref 82–98)
MONOCYTES # BLD AUTO: 0.5 K/UL (ref 0.3–1)
MONOCYTES NFR BLD: 7 % (ref 4–15)
NEUTROPHILS # BLD AUTO: 3.5 K/UL (ref 1.8–7.7)
NEUTROPHILS NFR BLD: 52 % (ref 38–73)
NRBC BLD-RTO: 0 /100 WBC
PLATELET # BLD AUTO: 303 K/UL (ref 150–450)
PMV BLD AUTO: 11.7 FL (ref 9.2–12.9)
RBC # BLD AUTO: 4.52 M/UL (ref 4–5.4)
T4 FREE SERPL-MCNC: 0.92 NG/DL (ref 0.71–1.51)
TSH SERPL DL<=0.005 MIU/L-ACNC: 1.99 UIU/ML (ref 0.4–4)
VIT B12 SERPL-MCNC: 460 PG/ML (ref 210–950)
WBC # BLD AUTO: 6.7 K/UL (ref 3.9–12.7)

## 2022-11-15 PROCEDURE — 36415 COLL VENOUS BLD VENIPUNCTURE: CPT | Mod: PN | Performed by: FAMILY MEDICINE

## 2022-11-15 PROCEDURE — 82607 VITAMIN B-12: CPT | Performed by: FAMILY MEDICINE

## 2022-11-15 PROCEDURE — 84439 ASSAY OF FREE THYROXINE: CPT | Performed by: FAMILY MEDICINE

## 2022-11-15 PROCEDURE — 85652 RBC SED RATE AUTOMATED: CPT | Performed by: FAMILY MEDICINE

## 2022-11-15 PROCEDURE — 99999 PR PBB SHADOW E&M-EST. PATIENT-LVL IV: ICD-10-PCS | Mod: PBBFAC,,, | Performed by: FAMILY MEDICINE

## 2022-11-15 PROCEDURE — 86140 C-REACTIVE PROTEIN: CPT | Performed by: FAMILY MEDICINE

## 2022-11-15 PROCEDURE — 83921 ORGANIC ACID SINGLE QUANT: CPT | Performed by: FAMILY MEDICINE

## 2022-11-15 PROCEDURE — 99214 OFFICE O/P EST MOD 30 MIN: CPT | Mod: PBBFAC,PN | Performed by: FAMILY MEDICINE

## 2022-11-15 PROCEDURE — 84443 ASSAY THYROID STIM HORMONE: CPT | Performed by: FAMILY MEDICINE

## 2022-11-15 PROCEDURE — 85025 COMPLETE CBC W/AUTO DIFF WBC: CPT | Performed by: FAMILY MEDICINE

## 2022-11-15 PROCEDURE — 99214 OFFICE O/P EST MOD 30 MIN: CPT | Mod: S$PBB,,, | Performed by: FAMILY MEDICINE

## 2022-11-15 PROCEDURE — 99999 PR PBB SHADOW E&M-EST. PATIENT-LVL IV: CPT | Mod: PBBFAC,,, | Performed by: FAMILY MEDICINE

## 2022-11-15 PROCEDURE — 99214 PR OFFICE/OUTPT VISIT, EST, LEVL IV, 30-39 MIN: ICD-10-PCS | Mod: S$PBB,,, | Performed by: FAMILY MEDICINE

## 2022-11-15 NOTE — PROGRESS NOTES
THIS DOCUMENT WAS MADE IN PART WITH VOICE RECOGNITION SOFTWARE.  OCCASIONALLY THIS SOFTWARE WILL MISINTERPRET WORDS OR PHRASES.    Assessment and Plan:    1. Immunization due        2. Anxiety        3. Greater trochanteric bursitis, right        4. Candidal skin infection        5. Fatigue, unspecified type  CBC Auto Differential    TSH    T4, Free    Vitamin B12    C-Reactive Protein    Sedimentation rate    Methylmalonic Acid, Serum      6. Senile debility  Ambulatory referral/consult to Physical/Occupational Therapy      7. Imbalance  Ambulatory referral/consult to Physical/Occupational Therapy          Check labs as above for fatigue complaint     Referred to physical therapy for evaluation treatment of senile debility, fatigue    Follow-up in 2 months for re-evaluation, encouraged COVID vaccine around approximately mid December    ______________________________________________________________________  Subjective:    Chief Complaint:  Chief Complaint   Patient presents with    Follow-up        HPI:  Christa is a 82 y.o. year old     Follow-up left hip pain -diagnosed trochanteric bursitis  Injection performed proximally 2 months ago  Hip pain greatly improved     Follow-up rash   Prescribed nystatin powder for suspected yeast rash underneath breast tissue   Much improved     New medication Xanax to use as needed for situational anxiety, low-dose prescribed at prior visit     New problem : complains of post covid fatigue. Also reports prolonged fatigue prior to infection   Reports significant fatigue, requesting physical therapy order    Overactive bladder   Nocturia 2x nightly      Allergic rhinitis   Rx-Claritin 10 mg, Flonase      Nicotine dependence   In remission-quit in 2004     Vascular insufficiency      Heartburn   Tums as needed      Osteopenia, vitamin-D deficiency  + Fall risk   Taking 5000 units daily vitamin-D, 500 mg calcium daily  No recent fractures    Situational anxiety  Med-Xanax 0.25 mg  daily p.r.n.      Past Medical History:  Past Medical History:   Diagnosis Date    Cataract     OD       Past Surgical History:  Past Surgical History:   Procedure Laterality Date    CATARACT EXTRACTION W/  INTRAOCULAR LENS IMPLANT Left 2017    Dr. Nelson       Family History:  Family History   Problem Relation Age of Onset    Glaucoma Neg Hx     Macular degeneration Neg Hx        Social History:  Social History     Socioeconomic History    Marital status:    Tobacco Use    Smoking status: Former     Types: Cigarettes     Quit date: 2004     Years since quittin.8    Smokeless tobacco: Never       Medications:  Current Outpatient Medications on File Prior to Visit   Medication Sig Dispense Refill    benzonatate (TESSALON) 100 MG capsule Take 1 capsule (100 mg total) by mouth 3 (three) times daily as needed for Cough. 10 capsule 0    calcium carbonate (TUMS) 200 mg calcium (500 mg) chewable tablet Take 1 tablet by mouth once daily.      cholecalciferol, vitamin D3, (VITAMIN D3) 125 mcg (5,000 unit) Tab Take 1 tablet (5,000 Units total) by mouth once daily. 90 tablet 3    diphenhydrAMINE (BENADRYL) spray Apply topically every 4 (four) hours as needed for Itching. 1 each 0    fluticasone propionate (FLONASE) 50 mcg/actuation nasal spray 2 sprays (100 mcg total) by Each Nostril route once daily. 54 g 3    nystatin (MYCOSTATIN) powder Apply topically 2 (two) times daily. 60 g 2    ondansetron (ZOFRAN-ODT) 4 MG TbDL Take 1 tablet (4 mg total) by mouth every 8 (eight) hours as needed (nausea). 10 tablet 0    polyethylene glycol (GLYCOLAX) 17 gram PwPk Take 17 g by mouth once daily at 6am.      ALPRAZolam (XANAX) 0.25 MG tablet Take 1 tablet (0.25 mg total) by mouth daily as needed for Anxiety. (Patient not taking: Reported on 11/15/2022) 30 tablet 0    azelastine (ASTELIN) 137 mcg (0.1 %) nasal spray SMARTSIG:Both Nares      ciprofloxacin HCl (CIPRO) 500 MG tablet Take 1 tablet (500 mg total) by  "mouth 2 (two) times daily. 10 tablet 0    loratadine (CLARITIN) 10 mg tablet Take 1 tablet (10 mg total) by mouth once daily. 30 tablet 2    meloxicam (MOBIC) 15 MG tablet Take 15 mg by mouth once daily.      mirabegron (MYRBETRIQ) 25 mg Tb24 ER tablet Take 1 tablet (25 mg total) by mouth once daily. (Patient not taking: Reported on 11/15/2022) 30 tablet 11    prednisoLONE acetate (PRED FORTE) 1 % DrpS        No current facility-administered medications on file prior to visit.       Allergies:  Metal staples [surgical stainless steel], Penicillins, and Levofloxacin    Immunizations:  Immunization History   Administered Date(s) Administered    COVID-19, MRNA, LN-S, PF (MODERNA FULL 0.5 ML DOSE) 02/18/2021, 03/18/2021    Tdap 05/15/2019       Review of Systems:  Review of Systems   All other systems reviewed and are negative.    Objective:    Vitals:  Vitals:    11/15/22 1330   BP: 126/72   Pulse: 89   Resp: 14   Weight: 77.3 kg (170 lb 8.4 oz)   Height: 5' 3" (1.6 m)   PainSc: 0-No pain       Physical Exam  Vitals reviewed.   Constitutional:       General: She is not in acute distress.  HENT:      Head: Normocephalic and atraumatic.   Eyes:      Pupils: Pupils are equal, round, and reactive to light.   Cardiovascular:      Rate and Rhythm: Normal rate and regular rhythm.      Heart sounds: No murmur heard.    No friction rub.   Pulmonary:      Effort: Pulmonary effort is normal.      Breath sounds: Normal breath sounds.   Abdominal:      General: Bowel sounds are normal. There is no distension.      Palpations: Abdomen is soft.      Tenderness: There is no abdominal tenderness.   Musculoskeletal:      Cervical back: Neck supple.   Skin:     General: Skin is warm and dry.      Findings: No rash.   Psychiatric:         Behavior: Behavior normal.           Jonah Stone MD  Family Medicine        "

## 2022-11-19 LAB — METHYLMALONATE SERPL-SCNC: 0.35 UMOL/L

## 2022-11-21 ENCOUNTER — PATIENT MESSAGE (OUTPATIENT)
Dept: FAMILY MEDICINE | Facility: CLINIC | Age: 82
End: 2022-11-21
Payer: MEDICARE

## 2022-12-07 ENCOUNTER — OFFICE VISIT (OUTPATIENT)
Dept: HOME HEALTH SERVICES | Facility: CLINIC | Age: 82
End: 2022-12-07
Payer: MEDICARE

## 2022-12-07 VITALS
HEIGHT: 63 IN | WEIGHT: 170 LBS | SYSTOLIC BLOOD PRESSURE: 130 MMHG | DIASTOLIC BLOOD PRESSURE: 76 MMHG | OXYGEN SATURATION: 96 % | BODY MASS INDEX: 30.12 KG/M2 | HEART RATE: 88 BPM

## 2022-12-07 DIAGNOSIS — K62.89 ANAL PAIN: ICD-10-CM

## 2022-12-07 DIAGNOSIS — Z00.00 ENCOUNTER FOR PREVENTIVE HEALTH EXAMINATION: Primary | ICD-10-CM

## 2022-12-07 DIAGNOSIS — K62.89 RECTAL MASS: ICD-10-CM

## 2022-12-07 DIAGNOSIS — I70.0 ATHEROSCLEROSIS OF AORTA: ICD-10-CM

## 2022-12-07 DIAGNOSIS — I99.8 VASCULAR INSUFFICIENCY: ICD-10-CM

## 2022-12-07 DIAGNOSIS — K64.9 HEMORRHOIDS, UNSPECIFIED HEMORRHOID TYPE: ICD-10-CM

## 2022-12-07 DIAGNOSIS — M81.0 OSTEOPOROSIS, UNSPECIFIED OSTEOPOROSIS TYPE, UNSPECIFIED PATHOLOGICAL FRACTURE PRESENCE: ICD-10-CM

## 2022-12-07 DIAGNOSIS — K59.09 CHRONIC CONSTIPATION: ICD-10-CM

## 2022-12-07 PROCEDURE — G0439 PPPS, SUBSEQ VISIT: HCPCS | Mod: S$GLB,,, | Performed by: NURSE PRACTITIONER

## 2022-12-07 PROCEDURE — G0439 PR MEDICARE ANNUAL WELLNESS SUBSEQUENT VISIT: ICD-10-PCS | Mod: S$GLB,,, | Performed by: NURSE PRACTITIONER

## 2022-12-09 ENCOUNTER — TELEPHONE (OUTPATIENT)
Dept: SURGERY | Facility: CLINIC | Age: 82
End: 2022-12-09
Payer: MEDICARE

## 2022-12-09 NOTE — TELEPHONE ENCOUNTER
----- Message from Joselo Fraga NP sent at 12/8/2022  5:36 PM CST -----  This patient needs to see surgery, referral placed  Will you please call to schedule    joselo

## 2022-12-11 PROBLEM — I70.0 ATHEROSCLEROSIS OF AORTA: Status: ACTIVE | Noted: 2022-12-11

## 2022-12-12 NOTE — PROGRESS NOTES
"  Christa Tate presented for a  Medicare AWV and comprehensive Health Risk Assessment today. The following components were reviewed and updated:    Medical history  Family History  Social history  Allergies and Current Medications  Health Risk Assessment  Health Maintenance  Care Team         ** See Completed Assessments for Annual Wellness Visit within the encounter summary.**         The following assessments were completed:  Living Situation  CAGE  Depression Screening  Timed Get Up and Go  Whisper Test  Cognitive Function Screening  Nutrition Screening  ADL Screening  PAQ Screening        Vitals:    12/07/22 1117   BP: 130/76   Pulse: 88   SpO2: 96%   Weight: 77.1 kg (170 lb)   Height: 5' 3" (1.6 m)     Body mass index is 30.11 kg/m².  Physical Exam  Constitutional:       Appearance: Normal appearance.   HENT:      Head: Normocephalic and atraumatic.      Nose: Nose normal.   Eyes:      Extraocular Movements: Extraocular movements intact.      Pupils: Pupils are equal, round, and reactive to light.   Cardiovascular:      Rate and Rhythm: Normal rate and regular rhythm.      Pulses: Normal pulses.      Heart sounds: Normal heart sounds.   Pulmonary:      Effort: Pulmonary effort is normal.      Breath sounds: Normal breath sounds.   Musculoskeletal:      Cervical back: Normal range of motion and neck supple.   Neurological:      General: No focal deficit present.      Mental Status: She is alert and oriented to person, place, and time.             Diagnoses and health risks identified today and associated recommendations/orders:    1. Encounter for preventive health examination  Awv completed      2. Atherosclerosis of aorta  Chronic and stable. Continue current treatment. Follow with PCP.      3. Vascular insufficiency  Chronic and stable. Continue current treatment. Follow with PCP.      4. Chronic constipation  Chronic and stable. Continue current treatment. Follow with PCP.      5. Osteoporosis, " unspecified osteoporosis type, unspecified pathological fracture presence  Chronic and stable. Continue current treatment. Follow with PCP.      6. Anal pain  - Ambulatory referral/consult to General Surgery; Future    7. Rectal mass    - Ambulatory referral/consult to General Surgery; Future    8. Hemorrhoids, unspecified hemorrhoid type    - Ambulatory referral/consult to General Surgery; Future      Provided Christa with a 5-10 year written screening schedule and personal prevention plan. Recommendations were developed using the USPSTF age appropriate recommendations. Education, counseling, and referrals were provided as needed. After Visit Summary printed and given to patient which includes a list of additional screenings\tests needed.    Fu in 1yr for JUSTUS Fraga, KEE  I offered to discuss advanced care planning, including how to pick a person who would make decisions for you if you were unable to make them for yourself, called a health care power of , and what kind of decisions you might make such as use of life sustaining treatments such as ventilators and tube feeding when faced with a life limiting illness recorded on a living will that they will need to know. (How you want to be cared for as you near the end of your natural life)     X Patient is interested in learning more about how to make advanced directives.  I provided them paperwork and offered to discuss this with them.

## 2022-12-12 NOTE — PATIENT INSTRUCTIONS
Counseling and Referral of Other Preventative  (Italic type indicates deductible and co-insurance are waived)    Patient Name: Christa Tate  Today's Date: 12/11/2022    Health Maintenance       Date Due Completion Date    Shingles Vaccine (1 of 2) Never done ---    Pneumococcal Vaccines (Age 65+) (1 - PCV) Never done ---    COVID-19 Vaccine (3 - Booster for Moderna series) 05/13/2021 3/18/2021    Influenza Vaccine (1) Never done ---    DEXA Scan 10/16/2023 10/16/2020    Lipid Panel 01/14/2027 1/14/2022    TETANUS VACCINE 05/15/2029 5/15/2019        Orders Placed This Encounter   Procedures    Ambulatory referral/consult to General Surgery     The following information is provided to all patients.  This information is to help you find resources for any of the problems found today that may be affecting your health:                Living healthy guide: www.[facility].[place].[facility]      Understanding Diabetes: www.diabetes.org      Eating healthy: www.cdc.gov/healthyweight      [facility] home safety checklist: www.cdc.gov/steadi/patient.html      Agency on Aging: www.goea.[place]      Alcoholics anonymous (AA): www.aa.org      Physical Activity: www.[place]ella.nih.gov/ru3fjsb      Tobacco use: www.quitwithusla.org

## 2022-12-16 ENCOUNTER — PATIENT MESSAGE (OUTPATIENT)
Dept: FAMILY MEDICINE | Facility: CLINIC | Age: 82
End: 2022-12-16
Payer: MEDICARE

## 2022-12-21 ENCOUNTER — PATIENT OUTREACH (OUTPATIENT)
Dept: ADMINISTRATIVE | Facility: HOSPITAL | Age: 82
End: 2022-12-21
Payer: MEDICARE

## 2022-12-21 ENCOUNTER — OFFICE VISIT (OUTPATIENT)
Dept: SURGERY | Facility: CLINIC | Age: 82
End: 2022-12-21
Payer: MEDICARE

## 2022-12-21 ENCOUNTER — TELEPHONE (OUTPATIENT)
Dept: SURGERY | Facility: CLINIC | Age: 82
End: 2022-12-21
Payer: MEDICARE

## 2022-12-21 VITALS
BODY MASS INDEX: 29.8 KG/M2 | DIASTOLIC BLOOD PRESSURE: 65 MMHG | HEART RATE: 71 BPM | WEIGHT: 168.19 LBS | HEIGHT: 63 IN | TEMPERATURE: 98 F | SYSTOLIC BLOOD PRESSURE: 148 MMHG

## 2022-12-21 DIAGNOSIS — K62.89 ANAL PAIN: ICD-10-CM

## 2022-12-21 DIAGNOSIS — K62.89 RECTAL MASS: Primary | ICD-10-CM

## 2022-12-21 DIAGNOSIS — K64.9 HEMORRHOIDS, UNSPECIFIED HEMORRHOID TYPE: ICD-10-CM

## 2022-12-21 PROCEDURE — 99213 OFFICE O/P EST LOW 20 MIN: CPT | Mod: PBBFAC,PO | Performed by: SURGERY

## 2022-12-21 PROCEDURE — 99999 PR PBB SHADOW E&M-EST. PATIENT-LVL III: CPT | Mod: PBBFAC,,, | Performed by: SURGERY

## 2022-12-21 PROCEDURE — 99205 PR OFFICE/OUTPT VISIT, NEW, LEVL V, 60-74 MIN: ICD-10-PCS | Mod: S$PBB,,, | Performed by: SURGERY

## 2022-12-21 PROCEDURE — 99999 PR PBB SHADOW E&M-EST. PATIENT-LVL III: ICD-10-PCS | Mod: PBBFAC,,, | Performed by: SURGERY

## 2022-12-21 PROCEDURE — 99205 OFFICE O/P NEW HI 60 MIN: CPT | Mod: S$PBB,,, | Performed by: SURGERY

## 2022-12-21 RX ORDER — MELOXICAM 15 MG/1
15 TABLET ORAL DAILY
COMMUNITY
Start: 2022-12-12 | End: 2024-03-20

## 2022-12-21 NOTE — TELEPHONE ENCOUNTER
----- Message from Berenice De Los Santos sent at 12/21/2022  9:06 AM CST -----  Regarding: would like to be seen today if possible  Type:  Same Day Appointment Request    Caller is requesting a same day appointment.  Caller declined first available appointment listed below.      Name of Caller:  Christa  When is the first available appointment?  12/22    Best Call Back Number:  [phone]    Additional Information:   Pt called asking if there was anyway possible she could come today to see Dr. Hinkle. I let her know there were no available appts today but would reach out to the office anyway for her just in case. She wanted to be seen before the weather got bad.

## 2022-12-21 NOTE — TELEPHONE ENCOUNTER
I called patient to offer her a 1:45pm appointment today since we had a cancel instead of tomorrow @ 9:45am.  Patient accepted the new appointment.  Tato

## 2022-12-21 NOTE — TELEPHONE ENCOUNTER
I called patient to inform that I do not have any openings today with Dr. Hinkle, but it I get a cancel then I'll call her.  She has an appointment tomorrow @ 9:45am, but didn't want to come out in the cold weather.  I offered to reschedule her for after the New Year, but she stated that she'll keep her appointment for tomorrow.  Tato

## 2022-12-21 NOTE — PROGRESS NOTES
Subjective:       Patient ID: Christa Tate is a 82 y.o. female.    Chief Complaint: Consult (Hemorrhoids)    HPI  this is a pleasant 82-year-old female who was referred to me for evaluation of a perianal lesion.  She notes she is a tender nodule in the perianal area that has gotten progressively larger.  It does cause pain and tenderness when touched on sitting.  Notes occasional mucus leakage and bleeding.  She is concerned that this is hemorrhoid.  She notes her last colonoscopy was 10 years ago.  Past medical history significant for overactive bladder history of UTIs.  No significant past surgical history.  Review of Systems   Constitutional:  Negative for activity change and appetite change.   Respiratory:  Negative for apnea and cough.    Gastrointestinal:  Positive for rectal pain. Negative for blood in stool, change in bowel habit, nausea, vomiting and change in bowel habit.       Objective:      Physical Exam  Vitals reviewed.   Cardiovascular:      Rate and Rhythm: Normal rate.      Pulses: Normal pulses.   Pulmonary:      Effort: Pulmonary effort is normal. No respiratory distress.      Breath sounds: No wheezing.   Abdominal:      General: There is no distension.   Genitourinary:     Comments: External exam demonstrates firm malignant appearing mass the left lateral perianal space.  Digital rectal exam with no palpable abnormality.  Anoscopy not performed  Neurological:      Mental Status: She is alert.       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  Recommend proceeding to the OR for EUA and excision of perianal lesion.  Further recommendations will be based on pathology.  Informed consent has been obtained.  Surgery scheduled for next week

## 2022-12-21 NOTE — TELEPHONE ENCOUNTER
----- Message from Berenice De Los Santos sent at 12/21/2022  9:06 AM CST -----  Regarding: would like to be seen today if possible  Type:  Same Day Appointment Request    Caller is requesting a same day appointment.  Caller declined first available appointment listed below.      Name of Caller:  Christa  When is the first available appointment?  12/22    Best Call Back Number:  [phone]    Additional Information:   Pt called asking if there was anyway possible she could come today to see Dr. Hinkle. I let her know there were no available appts today but would reach out to the office anyway for her just in case. She wanted to be seen before the weather got bad.

## 2022-12-22 ENCOUNTER — PATIENT MESSAGE (OUTPATIENT)
Dept: SURGERY | Facility: CLINIC | Age: 82
End: 2022-12-22
Payer: MEDICARE

## 2022-12-22 RX ORDER — SODIUM CHLORIDE 9 MG/ML
INJECTION, SOLUTION INTRAVENOUS CONTINUOUS
Status: CANCELLED | OUTPATIENT
Start: 2022-12-22

## 2022-12-27 ENCOUNTER — ANESTHESIA EVENT (OUTPATIENT)
Dept: SURGERY | Facility: HOSPITAL | Age: 82
End: 2022-12-27
Payer: MEDICARE

## 2022-12-28 ENCOUNTER — HOSPITAL ENCOUNTER (OUTPATIENT)
Facility: HOSPITAL | Age: 82
Discharge: HOME | End: 2022-12-28
Attending: SURGERY | Admitting: SURGERY
Payer: MEDICARE

## 2022-12-28 ENCOUNTER — ANESTHESIA (OUTPATIENT)
Dept: SURGERY | Facility: HOSPITAL | Age: 82
End: 2022-12-28
Payer: MEDICARE

## 2022-12-28 DIAGNOSIS — K62.89 RECTAL MASS: ICD-10-CM

## 2022-12-28 DIAGNOSIS — K64.9 HEMORRHOIDS, UNSPECIFIED HEMORRHOID TYPE: ICD-10-CM

## 2022-12-28 DIAGNOSIS — K62.89 ANAL PAIN: ICD-10-CM

## 2022-12-28 PROCEDURE — 25000003 PHARM REV CODE 250: Mod: PO | Performed by: NURSE ANESTHETIST, CERTIFIED REGISTERED

## 2022-12-28 PROCEDURE — D9220A PRA ANESTHESIA: Mod: CRNA,,, | Performed by: NURSE ANESTHETIST, CERTIFIED REGISTERED

## 2022-12-28 PROCEDURE — 63600175 PHARM REV CODE 636 W HCPCS: Mod: PO | Performed by: ANESTHESIOLOGY

## 2022-12-28 PROCEDURE — 88305 TISSUE EXAM BY PATHOLOGIST: ICD-10-PCS | Mod: 26,,, | Performed by: STUDENT IN AN ORGANIZED HEALTH CARE EDUCATION/TRAINING PROGRAM

## 2022-12-28 PROCEDURE — D9220A PRA ANESTHESIA: Mod: ANES,,, | Performed by: ANESTHESIOLOGY

## 2022-12-28 PROCEDURE — 88305 TISSUE EXAM BY PATHOLOGIST: CPT | Performed by: STUDENT IN AN ORGANIZED HEALTH CARE EDUCATION/TRAINING PROGRAM

## 2022-12-28 PROCEDURE — 36000706: Mod: PO | Performed by: SURGERY

## 2022-12-28 PROCEDURE — D9220A PRA ANESTHESIA: ICD-10-PCS | Mod: ANES,,, | Performed by: ANESTHESIOLOGY

## 2022-12-28 PROCEDURE — 25000003 PHARM REV CODE 250: Mod: PO | Performed by: SURGERY

## 2022-12-28 PROCEDURE — 88305 TISSUE EXAM BY PATHOLOGIST: CPT | Mod: 26,,, | Performed by: STUDENT IN AN ORGANIZED HEALTH CARE EDUCATION/TRAINING PROGRAM

## 2022-12-28 PROCEDURE — 71000033 HC RECOVERY, INTIAL HOUR: Mod: PO | Performed by: SURGERY

## 2022-12-28 PROCEDURE — 71000015 HC POSTOP RECOV 1ST HR: Mod: PO | Performed by: SURGERY

## 2022-12-28 PROCEDURE — 37000009 HC ANESTHESIA EA ADD 15 MINS: Mod: PO | Performed by: SURGERY

## 2022-12-28 PROCEDURE — 63600175 PHARM REV CODE 636 W HCPCS: Mod: PO | Performed by: NURSE ANESTHETIST, CERTIFIED REGISTERED

## 2022-12-28 PROCEDURE — 46922 PR SURG EXCISION OF ANAL LESION(S): ICD-10-PCS | Mod: ,,, | Performed by: SURGERY

## 2022-12-28 PROCEDURE — D9220A PRA ANESTHESIA: ICD-10-PCS | Mod: CRNA,,, | Performed by: NURSE ANESTHETIST, CERTIFIED REGISTERED

## 2022-12-28 PROCEDURE — 37000008 HC ANESTHESIA 1ST 15 MINUTES: Mod: PO | Performed by: SURGERY

## 2022-12-28 PROCEDURE — 36000707: Mod: PO | Performed by: SURGERY

## 2022-12-28 PROCEDURE — 46922 EXCISION OF ANAL LESION(S): CPT | Mod: ,,, | Performed by: SURGERY

## 2022-12-28 RX ORDER — FENTANYL CITRATE 50 UG/ML
25 INJECTION, SOLUTION INTRAMUSCULAR; INTRAVENOUS EVERY 5 MIN PRN
Status: ACTIVE | OUTPATIENT
Start: 2022-12-28

## 2022-12-28 RX ORDER — OXYCODONE HYDROCHLORIDE 5 MG/1
5 TABLET ORAL
Status: ACTIVE | OUTPATIENT
Start: 2022-12-28

## 2022-12-28 RX ORDER — ONDANSETRON 2 MG/ML
INJECTION INTRAMUSCULAR; INTRAVENOUS
Status: DISCONTINUED | OUTPATIENT
Start: 2022-12-28 | End: 2022-12-28

## 2022-12-28 RX ORDER — MIDAZOLAM HYDROCHLORIDE 1 MG/ML
INJECTION, SOLUTION INTRAMUSCULAR; INTRAVENOUS
Status: DISCONTINUED | OUTPATIENT
Start: 2022-12-28 | End: 2022-12-28

## 2022-12-28 RX ORDER — HYDROCODONE BITARTRATE AND ACETAMINOPHEN 5; 325 MG/1; MG/1
1 TABLET ORAL EVERY 6 HOURS PRN
Qty: 30 TABLET | Refills: 0 | Status: SHIPPED | OUTPATIENT
Start: 2022-12-28 | End: 2023-08-01

## 2022-12-28 RX ORDER — SODIUM CHLORIDE 9 MG/ML
INJECTION, SOLUTION INTRAVENOUS CONTINUOUS
Status: DISCONTINUED | OUTPATIENT
Start: 2022-12-28 | End: 2022-12-28 | Stop reason: HOSPADM

## 2022-12-28 RX ORDER — ONDANSETRON 2 MG/ML
4 INJECTION INTRAMUSCULAR; INTRAVENOUS EVERY 12 HOURS PRN
Status: DISCONTINUED | OUTPATIENT
Start: 2022-12-28 | End: 2022-12-28 | Stop reason: HOSPADM

## 2022-12-28 RX ORDER — LIDOCAINE HYDROCHLORIDE 20 MG/ML
INJECTION INTRAVENOUS
Status: DISCONTINUED | OUTPATIENT
Start: 2022-12-28 | End: 2022-12-28

## 2022-12-28 RX ORDER — SODIUM CHLORIDE, SODIUM LACTATE, POTASSIUM CHLORIDE, CALCIUM CHLORIDE 600; 310; 30; 20 MG/100ML; MG/100ML; MG/100ML; MG/100ML
INJECTION, SOLUTION INTRAVENOUS CONTINUOUS
Status: DISPENSED | OUTPATIENT
Start: 2022-12-28

## 2022-12-28 RX ORDER — FENTANYL CITRATE 50 UG/ML
INJECTION, SOLUTION INTRAMUSCULAR; INTRAVENOUS
Status: DISCONTINUED | OUTPATIENT
Start: 2022-12-28 | End: 2022-12-28

## 2022-12-28 RX ORDER — HYDROMORPHONE HYDROCHLORIDE 2 MG/ML
0.2 INJECTION, SOLUTION INTRAMUSCULAR; INTRAVENOUS; SUBCUTANEOUS EVERY 5 MIN PRN
Status: ACTIVE | OUTPATIENT
Start: 2022-12-28

## 2022-12-28 RX ORDER — DEXAMETHASONE SODIUM PHOSPHATE 4 MG/ML
INJECTION, SOLUTION INTRA-ARTICULAR; INTRALESIONAL; INTRAMUSCULAR; INTRAVENOUS; SOFT TISSUE
Status: DISCONTINUED | OUTPATIENT
Start: 2022-12-28 | End: 2022-12-28

## 2022-12-28 RX ORDER — CLINDAMYCIN PHOSPHATE 900 MG/50ML
900 INJECTION, SOLUTION INTRAVENOUS
Status: DISCONTINUED | OUTPATIENT
Start: 2022-12-28 | End: 2022-12-28 | Stop reason: HOSPADM

## 2022-12-28 RX ORDER — BUPIVACAINE HYDROCHLORIDE AND EPINEPHRINE 5; 5 MG/ML; UG/ML
INJECTION, SOLUTION EPIDURAL; INTRACAUDAL; PERINEURAL
Status: DISCONTINUED | OUTPATIENT
Start: 2022-12-28 | End: 2022-12-28 | Stop reason: HOSPADM

## 2022-12-28 RX ORDER — PROPOFOL 10 MG/ML
VIAL (ML) INTRAVENOUS CONTINUOUS PRN
Status: DISCONTINUED | OUTPATIENT
Start: 2022-12-28 | End: 2022-12-28

## 2022-12-28 RX ORDER — LIDOCAINE HYDROCHLORIDE 10 MG/ML
1 INJECTION, SOLUTION EPIDURAL; INFILTRATION; INTRACAUDAL; PERINEURAL ONCE
Status: ACTIVE | OUTPATIENT
Start: 2022-12-28

## 2022-12-28 RX ORDER — HYDROCODONE BITARTRATE AND ACETAMINOPHEN 5; 325 MG/1; MG/1
1 TABLET ORAL EVERY 4 HOURS PRN
Status: DISCONTINUED | OUTPATIENT
Start: 2022-12-28 | End: 2022-12-28 | Stop reason: HOSPADM

## 2022-12-28 RX ORDER — ACETAMINOPHEN 10 MG/ML
INJECTION, SOLUTION INTRAVENOUS
Status: DISCONTINUED | OUTPATIENT
Start: 2022-12-28 | End: 2022-12-28

## 2022-12-28 RX ORDER — PROPOFOL 10 MG/ML
VIAL (ML) INTRAVENOUS
Status: DISCONTINUED | OUTPATIENT
Start: 2022-12-28 | End: 2022-12-28

## 2022-12-28 RX ADMIN — PROPOFOL 30 MG: 10 INJECTION, EMULSION INTRAVENOUS at 10:12

## 2022-12-28 RX ADMIN — FENTANYL CITRATE 25 MCG: 50 INJECTION, SOLUTION INTRAMUSCULAR; INTRAVENOUS at 10:12

## 2022-12-28 RX ADMIN — SODIUM CHLORIDE, POTASSIUM CHLORIDE, SODIUM LACTATE AND CALCIUM CHLORIDE: 600; 310; 30; 20 INJECTION, SOLUTION INTRAVENOUS at 09:12

## 2022-12-28 RX ADMIN — LIDOCAINE HYDROCHLORIDE 75 MG: 20 INJECTION INTRAVENOUS at 10:12

## 2022-12-28 RX ADMIN — ONDANSETRON 4 MG: 2 INJECTION, SOLUTION INTRAMUSCULAR; INTRAVENOUS at 10:12

## 2022-12-28 RX ADMIN — DEXAMETHASONE SODIUM PHOSPHATE 4 MG: 4 INJECTION, SOLUTION INTRAMUSCULAR; INTRAVENOUS at 10:12

## 2022-12-28 RX ADMIN — MIDAZOLAM HYDROCHLORIDE 0.5 MG: 1 INJECTION, SOLUTION INTRAMUSCULAR; INTRAVENOUS at 10:12

## 2022-12-28 RX ADMIN — ACETAMINOPHEN 1000 MG: 10 INJECTION, SOLUTION INTRAVENOUS at 10:12

## 2022-12-28 RX ADMIN — PROPOFOL 75 MCG/KG/MIN: 10 INJECTION, EMULSION INTRAVENOUS at 10:12

## 2022-12-28 RX ADMIN — PROPOFOL 15 MG: 10 INJECTION, EMULSION INTRAVENOUS at 10:12

## 2022-12-28 RX ADMIN — TACROLIMUS 900 MG: 0.5 CAPSULE ORAL at 09:12

## 2022-12-28 RX ADMIN — MIDAZOLAM HYDROCHLORIDE 1 MG: 1 INJECTION, SOLUTION INTRAMUSCULAR; INTRAVENOUS at 10:12

## 2022-12-28 NOTE — PLAN OF CARE
VSS, all questions answered. Denies recent fever or illness. Daughter brought to bedside. Pt updated on wait time. Pt states ready for procedure.

## 2022-12-28 NOTE — BRIEF OP NOTE
[facility] - Surgery  Brief Operative Note    Surgery Date: 12/28/2022     Surgeon(s) and Role:     * Dutch Hinkle MD - Primary    Assisting Surgeon: None    Pre-op Diagnosis:  Anal pain [K62.89]  Hemorrhoids, unspecified hemorrhoid type [K64.9]  Rectal mass [K62.89]    Post-op Diagnosis:  Post-Op Diagnosis Codes:     * Anal pain [K62.89]     * Hemorrhoids, unspecified hemorrhoid type [K64.9]     * Rectal mass [K62.89]    Procedure: Excision of perianal mass ( L posterolateral position)    Anesthesia: General/MAC    Operative Findings: 1.5 cm lesion in L posterolateral position    Estimated Blood Loss: 5 cc's           Specimens:   Specimen (24h ago, onward)      None              Discharge Note    OUTCOME: Patient tolerated treatment/procedure well without complication and is now ready for discharge.    DISPOSITION: Home or Self Care    FINAL DIAGNOSIS:  Rectal mass    FOLLOWUP: In clinic    DISCHARGE INSTRUCTIONS:    Discharge Procedure Orders   Diet general     Call MD for:  extreme fatigue     Call MD for:  persistent dizziness or light-headedness     Call MD for:  hives     Call MD for:  redness, tenderness, or signs of infection (pain, swelling, redness, odor or green/yellow discharge around incision site)     Call MD for:  difficulty breathing, headache or visual disturbances     Call MD for:  severe uncontrolled pain     Call MD for:  persistent nausea and vomiting     Call MD for:  temperature >100.4     Activity as tolerated

## 2022-12-28 NOTE — TRANSFER OF CARE
Anesthesia Transfer of Care Note    Patient: Christa Tate    Procedure(s) Performed: Procedure(s) (LRB):  Exam under anesthesia (N/A)  BIOPSY, RECTUM (N/A)    Patient location: PACU    Anesthesia Type: MAC    Transport from OR: Transported from OR on room air with adequate spontaneous ventilation    Post pain: adequate analgesia    Post assessment: no apparent anesthetic complications and tolerated procedure well    Post vital signs: stable    Level of consciousness: awake, alert and oriented    Nausea/Vomiting: no nausea/vomiting    Complications: none    Transfer of care protocol was followed      Last vitals:   Visit Vitals  /67 (BP Location: Right arm, Patient Position: Lying)   Pulse 85   Temp 36.7 °C (98 °F)   Resp 18   SpO2 97%   Breastfeeding No

## 2022-12-28 NOTE — ANESTHESIA PREPROCEDURE EVALUATION
12/28/2022  Christa Tate is a 82 y.o., female.      Pre-op Assessment    I have reviewed the Patient Summary Reports.     I have reviewed the Nursing Notes.       Review of Systems  Anesthesia Hx:  No problems with previous Anesthesia    Cardiovascular:  Cardiovascular Normal     Pulmonary:  Pulmonary Normal        Physical Exam  General: Well nourished        Anesthesia Plan  Type of Anesthesia, risks & benefits discussed:    Anesthesia Type: MAC  Intra-op Monitoring Plan: Standard ASA Monitors  Post Op Pain Control Plan: multimodal analgesia and IV/PO Opioids PRN  Induction:  IV  Informed Consent: Informed consent signed with the Patient and all parties understand the risks and agree with anesthesia plan.  All questions answered.   ASA Score: 2  Day of Surgery Review of History & Physical: H&P Update referred to the surgeon/provider.    Ready For Surgery From Anesthesia Perspective.     .

## 2022-12-28 NOTE — OP NOTE
Date of procedure:  December 28, 2022     Staff surgeon:  Dr. Dutch Hinkle     Preoperative diagnosis:  Anal lesion     Postoperative diagnosis:  The same     Procedure:  Excision of perianal mass  (Left Posterolateral position )    Anesthesia: Monitored anesthesia    Indication for procedure:  82-year-old female presented my office with a tender mass in the perianal area.  She noted having getting larger and desired to have it removed.  She was scheduled for surgery on the above-mentioned date.      Description of procedure:   Following signing informed consent she was taken the operating room placed supine position.  She was rolled placed in prone odin-knife position with the buttocks taped apart.  Monitored anesthesia was administered.  Perianal area was prepped and draped in standard fashion.  Time-out procedure was performed.  An appropriate block was performed without event.  The left posterior lateral perianal area there is a 1/2 cm firm mass worrisome for a malignant squamous cell carcinoma.  It was excised with a negative margin.  This does not involve the sphincter muscles.  Having excised it the lesion was marked such that short suture marked perianal margin.  Long suture marked left lateral position.  I irrigate ensure adequate hemostasis.  Wound was closed with a series of interrupted Vicryl sutures.  Patient was awakened taken recovery room stable condition.  There were no immediate complications.  Blood loss was 10 cc

## 2022-12-28 NOTE — DISCHARGE INSTRUCTIONS
Rectal surgery    DO:   Avoid strenuous activity for 2 weeks.   Expect a small amount of bleeding after bowel movements.   Perform sitz baths 2-3 times per day and after bowel movements.   Begin a high fiber diet.   Take a fiber supplement or stool softener daily.   Drink 8-10 cups of water daily to prevent constipation.   Shower in 24 hours.   Remove outer dressing with first bowel movement. The foam packing will come out by itself.   Rest on the side that relieves pressure.   Resume all home medications.    DO NOT:   Do not use laxatives.   Do not lift more than 15 lbs until released by physician.   Do not drive for next 24 hours or while taking narcotic medications.  Do not take additional tylenol/acetaminophen while taking narcotic pain medication that contains tylenol/acetaminophen.     WHEN TO CALL THE DOCTOR:  Signs of infection, such as redness, excessive swelling, or pus drainage.   Increased bleeding  Fever greater than 101.     Schedule a follow-up appointment in 2 weeks.  [phone]

## 2022-12-29 ENCOUNTER — TELEPHONE (OUTPATIENT)
Dept: SURGERY | Facility: HOSPITAL | Age: 82
End: 2022-12-29
Payer: MEDICARE

## 2022-12-29 VITALS
DIASTOLIC BLOOD PRESSURE: 58 MMHG | HEART RATE: 81 BPM | TEMPERATURE: 98 F | OXYGEN SATURATION: 96 % | SYSTOLIC BLOOD PRESSURE: 93 MMHG | RESPIRATION RATE: 18 BRPM

## 2022-12-29 NOTE — TELEPHONE ENCOUNTER
While doing post-op phone call, patient had a plethora of questions regarding post-op care. I went over her discharge instructions again with her over the phone. I told her about the foam packing, but she was concerned as something (which she described as looking like a stitch) had come out while she had a bowel movement this morning. She stated she had no significant bleeding, just a scant/small amount when she wiped after her bowel movement. After answering her questions, she was still concerned. Please reach out to patient to advise. Thank you.

## 2022-12-29 NOTE — TELEPHONE ENCOUNTER
Spoke to pt, informed that showers and wet wipes are perfect for hygiene. She need assurance she was doing the right care post op. Call prn any questions or concerns at any time..

## 2023-01-10 LAB
FINAL PATHOLOGIC DIAGNOSIS: NORMAL
Lab: NORMAL

## 2023-01-11 ENCOUNTER — OFFICE VISIT (OUTPATIENT)
Dept: SURGERY | Facility: CLINIC | Age: 83
End: 2023-01-11
Payer: MEDICARE

## 2023-01-11 VITALS
HEART RATE: 86 BPM | WEIGHT: 168.88 LBS | SYSTOLIC BLOOD PRESSURE: 125 MMHG | TEMPERATURE: 99 F | BODY MASS INDEX: 29.92 KG/M2 | DIASTOLIC BLOOD PRESSURE: 60 MMHG | HEIGHT: 63 IN

## 2023-01-11 DIAGNOSIS — Z09 POSTOP CHECK: Primary | ICD-10-CM

## 2023-01-11 PROCEDURE — 99024 POSTOP FOLLOW-UP VISIT: CPT | Mod: POP,,, | Performed by: SURGERY

## 2023-01-11 PROCEDURE — 99999 PR PBB SHADOW E&M-EST. PATIENT-LVL III: CPT | Mod: PBBFAC,,, | Performed by: SURGERY

## 2023-01-11 PROCEDURE — 99213 OFFICE O/P EST LOW 20 MIN: CPT | Mod: PBBFAC,PO | Performed by: SURGERY

## 2023-01-11 PROCEDURE — 99999 PR PBB SHADOW E&M-EST. PATIENT-LVL III: ICD-10-PCS | Mod: PBBFAC,,, | Performed by: SURGERY

## 2023-01-11 PROCEDURE — 99024 PR POST-OP FOLLOW-UP VISIT: ICD-10-PCS | Mod: POP,,, | Performed by: SURGERY

## 2023-01-11 NOTE — ANESTHESIA POSTPROCEDURE EVALUATION
Anesthesia Post Evaluation    Patient: Christa Tate    Procedure(s) Performed: Procedure(s) (LRB):  Exam under anesthesia (N/A)  BIOPSY, RECTUM (N/A)    Final Anesthesia Type: MAC      Patient location during evaluation: PACU  Patient participation: Yes- Able to Participate  Level of consciousness: awake and alert  Post-procedure vital signs: reviewed and stable  Pain management: adequate  Airway patency: patent    PONV status at discharge: No PONV  Anesthetic complications: no      Cardiovascular status: blood pressure returned to baseline  Respiratory status: unassisted and room air  Hydration status: euvolemic  Follow-up not needed.          Vitals Value Taken Time   BP 93/58 12/28/22 1110   Temp 36.5 °C (97.7 °F) 12/28/22 1043   Pulse 81 12/28/22 1110   Resp 18 12/28/22 1110   SpO2 96 % 12/28/22 1110         Event Time   Out of Recovery 10:45:00         Pain/Hieu Score: No data recorded

## 2023-01-11 NOTE — PROGRESS NOTES
Cc: post op    HPI: 82 y.o.  female  2 weeks s/p Excision of lesion from L postero lateral position..   Pt notes that she is feeling well.  Minimal pain.  No drainage.  No n/v.  No other complaints.      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   - No invasive carcinoma identified.   COMMENT:   *p16 immunostains were performed and show diffuse block positivity,   supporting the above diagnosis.   LESLEY DELA CRUZ M.D.       A/P:   Pt doing well post surgery.   Lengthy discussion with patient regarding pathology findings.  No evidence of carcinoma.  Does have HPV associated dysplasia associated with the lesion.  Unfortunately the margins are positive.  Could consider reexcision in perhaps this would be the most definitive approach.  However there is no visible lesion present nor was there obvious dysplasia present on exam.  Could also consider observation with three-month interval.  This is particularly reasonable given the patient's age of 82.  She notes she would prefer observation.  She will return to clinic in 3 months for observation.  Further recommendations to follow.

## 2023-01-17 ENCOUNTER — OFFICE VISIT (OUTPATIENT)
Dept: FAMILY MEDICINE | Facility: CLINIC | Age: 83
End: 2023-01-17
Payer: MEDICARE

## 2023-01-17 VITALS
WEIGHT: 168.88 LBS | OXYGEN SATURATION: 96 % | SYSTOLIC BLOOD PRESSURE: 122 MMHG | DIASTOLIC BLOOD PRESSURE: 68 MMHG | BODY MASS INDEX: 29.92 KG/M2 | HEART RATE: 84 BPM | HEIGHT: 63 IN

## 2023-01-17 DIAGNOSIS — A63.0 HPV (HUMAN PAPILLOMA VIRUS) ANOGENITAL INFECTION: ICD-10-CM

## 2023-01-17 DIAGNOSIS — I70.0 ATHEROSCLEROSIS OF AORTA: ICD-10-CM

## 2023-01-17 DIAGNOSIS — D09.9 SQUAMOUS CELL CARCINOMA IN SITU: ICD-10-CM

## 2023-01-17 DIAGNOSIS — J02.9 PHARYNGITIS, UNSPECIFIED ETIOLOGY: ICD-10-CM

## 2023-01-17 DIAGNOSIS — Z23 IMMUNIZATION DUE: Primary | ICD-10-CM

## 2023-01-17 DIAGNOSIS — R41.3 MEMORY IMPAIRMENT: ICD-10-CM

## 2023-01-17 DIAGNOSIS — Z79.899 DRUG THERAPY: ICD-10-CM

## 2023-01-17 LAB
CTP QC/QA: YES
S PYO RRNA THROAT QL PROBE: NEGATIVE

## 2023-01-17 PROCEDURE — 99213 OFFICE O/P EST LOW 20 MIN: CPT | Mod: PBBFAC,PN | Performed by: FAMILY MEDICINE

## 2023-01-17 PROCEDURE — 87880 STREP A ASSAY W/OPTIC: CPT | Mod: PBBFAC,PN | Performed by: FAMILY MEDICINE

## 2023-01-17 PROCEDURE — 99214 PR OFFICE/OUTPT VISIT, EST, LEVL IV, 30-39 MIN: ICD-10-PCS | Mod: S$PBB,,, | Performed by: FAMILY MEDICINE

## 2023-01-17 PROCEDURE — 99999 PR PBB SHADOW E&M-EST. PATIENT-LVL III: ICD-10-PCS | Mod: PBBFAC,,, | Performed by: FAMILY MEDICINE

## 2023-01-17 PROCEDURE — 99214 OFFICE O/P EST MOD 30 MIN: CPT | Mod: S$PBB,,, | Performed by: FAMILY MEDICINE

## 2023-01-17 PROCEDURE — 99999 PR PBB SHADOW E&M-EST. PATIENT-LVL III: CPT | Mod: PBBFAC,,, | Performed by: FAMILY MEDICINE

## 2023-01-17 NOTE — PROGRESS NOTES
THIS DOCUMENT WAS MADE IN PART WITH VOICE RECOGNITION SOFTWARE.  OCCASIONALLY THIS SOFTWARE WILL MISINTERPRET WORDS OR PHRASES.    Assessment and Plan:    1. Immunization due  (In Office Administered) Pneumococcal Conjugate Vaccine (20 Valent) (IM)      2. Atherosclerosis of aorta        3. Drug therapy  CBC Auto Differential    Comprehensive Metabolic Panel    Lipid Panel    Urinalysis Microscopic    Urinalysis, Reflex to Urine Culture Urine, Clean Catch      4. Pharyngitis, unspecified etiology  POCT rapid strep A    CULTURE, AEROBIC  (SPECIFY SOURCE)      5. Memory impairment  Vitamin B12    TSH    T4, Free    RPR    POCT urine dipstick without microscope    Methylmalonic Acid, Serum      6. Squamous cell carcinoma in situ        7. HPV (human papilloma virus) anogenital infection            PLAN    Catch up on pneumonia vaccine at next visit     Check labs as above, some pointed at memory impairment     Rapid strep negative, suspect viral pharyngitis.  Supportive therapy   Culture done     Follow-up General surgery for monitoring for squamous cell carcinoma      ______________________________________________________________________  Subjective:    Chief Complaint:  Chief Complaint   Patient presents with    Follow-up     Pt states this appointment was made by us         HPI:  Christa is a 82 y.o. year old     Interval history  Complaint of anal pain, refer to general surgeon who diagnosed a squamous cell carcinoma in situ, HPV related.  Had resection on December 28th.  Plans on monitoring for now with observation/follow-up appointments with surgery    Pharyngitis   Duration 3 days, pain worsening   Self treatment : warm salt water / apple cider vinegar   Denies any new respiratory symptoms     On questioning, patient seems to be having trouble with dates  MMSE today :  30/30  Denies any subjective concerns of memory      HPV related skin cancer, perianal  Resected December 28, 2022  Pathology-squamous cell  carcinoma in situ  Surgeon-Dr. Hinkle -- follow-up every 3 months    Overactive bladder   Nocturia 2x nightly   Prev med : Myrbetriq      Allergic rhinitis   Rx-Claritin 10 mg, Flonase      Nicotine dependence   In remission-quit in      Vascular insufficiency      Heartburn   Tums as needed      Osteopenia, vitamin-D deficiency  + Fall risk   Taking 5000 units daily vitamin-D, 500 mg calcium daily  No recent fractures     Situational anxiety  Med-Xanax 0.25 mg daily p.r.n.    Atherosclerosis of aorta  Previous LDL 99.4         Past Medical History:  Past Medical History:   Diagnosis Date    Cataract     OD       Past Surgical History:  Past Surgical History:   Procedure Laterality Date    CATARACT EXTRACTION W/  INTRAOCULAR LENS IMPLANT Left 2017    Dr. Nelson    EXAMINATION UNDER ANESTHESIA N/A 2022    Procedure: Exam under anesthesia;  Surgeon: Dutch Hinkle MD;  Location: [facility] OR;  Service: General;  Laterality: N/A;    RECTAL BIOPSY N/A 2022    Procedure: BIOPSY, RECTUM;  Surgeon: Dutch Hinkle MD;  Location: [facility] OR;  Service: General;  Laterality: N/A;       Family History:  Family History   Problem Relation Age of Onset    Glaucoma Neg Hx     Macular degeneration Neg Hx        Social History:  Social History     Socioeconomic History    Marital status:    Tobacco Use    Smoking status: Former     Types: Cigarettes     Quit date: 2004     Years since quittin.0    Smokeless tobacco: Never   Substance and Sexual Activity    Alcohol use: Not Currently    Drug use: Never     Social Determinants of Health     Financial Resource Strain: Low Risk     Difficulty of Paying Living Expenses: Not hard at all   Food Insecurity: No Food Insecurity    Worried About Running Out of Food in the Last Year: Never true    Ran Out of Food in the Last Year: Never true   Transportation Needs: No Transportation Needs    Lack of Transportation (Medical): No    Lack of Transportation  (Non-Medical): No   Physical Activity: Insufficiently Active    Days of Exercise per Week: 3 days    Minutes of Exercise per Session: 40 min   Stress: No Stress Concern Present    Feeling of Stress : Only a little   Social Connections: Moderately Isolated    Frequency of Communication with Friends and Family: More than three times a week    Frequency of Social Gatherings with Friends and Family: Once a week    Attends Gnosticism Services: More than 4 times per year    Active Member of Clubs or Organizations: No    Attends Club or Organization Meetings: Never    Marital Status:    Housing Stability: Unknown    Unable to Pay for Housing in the Last Year: No    Unstable Housing in the Last Year: No       Medications:  Current Outpatient Medications on File Prior to Visit   Medication Sig Dispense Refill    ALPRAZolam (XANAX) 0.25 MG tablet Take 1 tablet (0.25 mg total) by mouth daily as needed for Anxiety. 30 tablet 0    cholecalciferol, vitamin D3, (VITAMIN D3) 125 mcg (5,000 unit) Tab Take 1 tablet (5,000 Units total) by mouth once daily. 90 tablet 3    fluticasone propionate (FLONASE) 50 mcg/actuation nasal spray 2 sprays (100 mcg total) by Each Nostril route once daily. 54 g 3    HYDROcodone-acetaminophen (NORCO) 5-325 mg per tablet Take 1 tablet by mouth every 6 (six) hours as needed for Pain. 30 tablet 0    loratadine (CLARITIN) 10 mg tablet Take 1 tablet (10 mg total) by mouth once daily. 30 tablet 2    meloxicam (MOBIC) 15 MG tablet Take 15 mg by mouth once daily.      nystatin (MYCOSTATIN) powder Apply topically 2 (two) times daily. 60 g 2    polyethylene glycol (GLYCOLAX) 17 gram PwPk Take 17 g by mouth once daily at 6am.      calcium carbonate (TUMS) 200 mg calcium (500 mg) chewable tablet Take 1 tablet by mouth once daily.      ondansetron (ZOFRAN-ODT) 4 MG TbDL Take 1 tablet (4 mg total) by mouth every 8 (eight) hours as needed (nausea). (Patient not taking: Reported on 12/21/2022) 10 tablet 0  "    Current Facility-Administered Medications on File Prior to Visit   Medication Dose Route Frequency Provider Last Rate Last Admin    electrolyte-S (ISOLYTE)   Intravenous Continuous Torsten Lao MD        fentaNYL 50 mcg/mL injection 25 mcg  25 mcg Intravenous Q5 Min PRN Torsten Lao MD        HYDROmorphone (PF) injection 0.2 mg  0.2 mg Intravenous Q5 Min PRN Torsten Lao MD        lactated ringers infusion   Intravenous Continuous Torsten Lao MD 10 mL/hr at 12/28/22 0930 Restarted at 12/28/22 1040    LIDOcaine (PF) 10 mg/ml (1%) injection 10 mg  1 mL Intradermal Once Torsten Lao MD        oxyCODONE immediate release tablet 5 mg  5 mg Oral Q3H PRN Torsten Lao MD           Allergies:  Metal staples [surgical stainless steel], Penicillins, and Levofloxacin    Immunizations:  Immunization History   Administered Date(s) Administered    COVID-19, MRNA, LN-S, PF (MODERNA FULL 0.5 ML DOSE) 02/18/2021, 03/18/2021    Tdap 05/15/2019       Review of Systems:  Review of Systems   All other systems reviewed and are negative.    Objective:    Vitals:  Vitals:    01/17/23 1332   BP: 122/68   Pulse: 84   SpO2: 96%   Weight: 76.6 kg (168 lb 14 oz)   Height: 5' 3" (1.6 m)   PainSc:   5   PainLoc: Throat       Physical Exam  Vitals reviewed.   Constitutional:       General: She is not in acute distress.  HENT:      Head: Normocephalic and atraumatic.   Eyes:      Pupils: Pupils are equal, round, and reactive to light.   Cardiovascular:      Rate and Rhythm: Normal rate and regular rhythm.      Heart sounds: No murmur heard.    No friction rub.   Pulmonary:      Effort: Pulmonary effort is normal.      Breath sounds: Normal breath sounds.   Abdominal:      General: Bowel sounds are normal. There is no distension.      Palpations: Abdomen is soft.      Tenderness: There is no abdominal tenderness.   Musculoskeletal:      Cervical back: Neck supple.   Skin:     General: Skin is warm and dry.      Findings: No " rash.   Psychiatric:         Behavior: Behavior normal.           Jonah Stone MD  Family Medicine

## 2023-01-18 ENCOUNTER — LAB VISIT (OUTPATIENT)
Dept: LAB | Facility: HOSPITAL | Age: 83
End: 2023-01-18
Attending: FAMILY MEDICINE
Payer: MEDICARE

## 2023-01-18 DIAGNOSIS — Z79.899 DRUG THERAPY: ICD-10-CM

## 2023-01-18 DIAGNOSIS — R41.3 MEMORY IMPAIRMENT: ICD-10-CM

## 2023-01-18 LAB
ALBUMIN SERPL BCP-MCNC: 4.1 G/DL (ref 3.5–5.2)
ALP SERPL-CCNC: 76 U/L (ref 55–135)
ALT SERPL W/O P-5'-P-CCNC: 8 U/L (ref 10–44)
ANION GAP SERPL CALC-SCNC: 11 MMOL/L (ref 8–16)
AST SERPL-CCNC: 22 U/L (ref 10–40)
BASOPHILS # BLD AUTO: 0.04 K/UL (ref 0–0.2)
BASOPHILS NFR BLD: 0.8 % (ref 0–1.9)
BILIRUB SERPL-MCNC: 0.8 MG/DL (ref 0.1–1)
BUN SERPL-MCNC: 12 MG/DL (ref 8–23)
CALCIUM SERPL-MCNC: 10 MG/DL (ref 8.7–10.5)
CHLORIDE SERPL-SCNC: 104 MMOL/L (ref 95–110)
CHOLEST SERPL-MCNC: 165 MG/DL (ref 120–199)
CHOLEST/HDLC SERPL: 2.6 {RATIO} (ref 2–5)
CO2 SERPL-SCNC: 24 MMOL/L (ref 23–29)
CREAT SERPL-MCNC: 0.7 MG/DL (ref 0.5–1.4)
DIFFERENTIAL METHOD: ABNORMAL
EOSINOPHIL # BLD AUTO: 0.2 K/UL (ref 0–0.5)
EOSINOPHIL NFR BLD: 4.6 % (ref 0–8)
ERYTHROCYTE [DISTWIDTH] IN BLOOD BY AUTOMATED COUNT: 14.6 % (ref 11.5–14.5)
EST. GFR  (NO RACE VARIABLE): >60 ML/MIN/1.73 M^2
GLUCOSE SERPL-MCNC: 98 MG/DL (ref 70–110)
HCT VFR BLD AUTO: 38.7 % (ref 37–48.5)
HDLC SERPL-MCNC: 64 MG/DL (ref 40–75)
HDLC SERPL: 38.8 % (ref 20–50)
HGB BLD-MCNC: 12.3 G/DL (ref 12–16)
IMM GRANULOCYTES # BLD AUTO: 0.01 K/UL (ref 0–0.04)
IMM GRANULOCYTES NFR BLD AUTO: 0.2 % (ref 0–0.5)
LDLC SERPL CALC-MCNC: 90.2 MG/DL (ref 63–159)
LYMPHOCYTES # BLD AUTO: 2.1 K/UL (ref 1–4.8)
LYMPHOCYTES NFR BLD: 41 % (ref 18–48)
MCH RBC QN AUTO: 28.4 PG (ref 27–31)
MCHC RBC AUTO-ENTMCNC: 31.8 G/DL (ref 32–36)
MCV RBC AUTO: 89 FL (ref 82–98)
MONOCYTES # BLD AUTO: 0.3 K/UL (ref 0.3–1)
MONOCYTES NFR BLD: 6.5 % (ref 4–15)
NEUTROPHILS # BLD AUTO: 2.4 K/UL (ref 1.8–7.7)
NEUTROPHILS NFR BLD: 46.9 % (ref 38–73)
NONHDLC SERPL-MCNC: 101 MG/DL
NRBC BLD-RTO: 0 /100 WBC
PLATELET # BLD AUTO: 312 K/UL (ref 150–450)
PMV BLD AUTO: 11.1 FL (ref 9.2–12.9)
POTASSIUM SERPL-SCNC: 4.1 MMOL/L (ref 3.5–5.1)
PROT SERPL-MCNC: 7.3 G/DL (ref 6–8.4)
RBC # BLD AUTO: 4.33 M/UL (ref 4–5.4)
SODIUM SERPL-SCNC: 139 MMOL/L (ref 136–145)
T4 FREE SERPL-MCNC: 1.01 NG/DL (ref 0.71–1.51)
TRIGL SERPL-MCNC: 54 MG/DL (ref 30–150)
TSH SERPL DL<=0.005 MIU/L-ACNC: 1.72 UIU/ML (ref 0.4–4)
WBC # BLD AUTO: 5.05 K/UL (ref 3.9–12.7)

## 2023-01-18 PROCEDURE — 83921 ORGANIC ACID SINGLE QUANT: CPT | Performed by: FAMILY MEDICINE

## 2023-01-18 PROCEDURE — 80061 LIPID PANEL: CPT | Performed by: FAMILY MEDICINE

## 2023-01-18 PROCEDURE — 84443 ASSAY THYROID STIM HORMONE: CPT | Performed by: FAMILY MEDICINE

## 2023-01-18 PROCEDURE — 86592 SYPHILIS TEST NON-TREP QUAL: CPT | Performed by: FAMILY MEDICINE

## 2023-01-18 PROCEDURE — 82607 VITAMIN B-12: CPT | Performed by: FAMILY MEDICINE

## 2023-01-18 PROCEDURE — 80053 COMPREHEN METABOLIC PANEL: CPT | Performed by: FAMILY MEDICINE

## 2023-01-18 PROCEDURE — 85025 COMPLETE CBC W/AUTO DIFF WBC: CPT | Performed by: FAMILY MEDICINE

## 2023-01-18 PROCEDURE — 84439 ASSAY OF FREE THYROXINE: CPT | Performed by: FAMILY MEDICINE

## 2023-01-19 LAB
RPR SER QL: NORMAL
VIT B12 SERPL-MCNC: 509 PG/ML (ref 210–950)

## 2023-01-24 LAB — METHYLMALONATE SERPL-SCNC: 0.19 UMOL/L

## 2023-02-27 ENCOUNTER — TELEPHONE (OUTPATIENT)
Dept: SURGERY | Facility: CLINIC | Age: 83
End: 2023-02-27
Payer: MEDICARE

## 2023-02-27 NOTE — TELEPHONE ENCOUNTER
I called patient and moved her appointment on Thursday, 4/6/23 @ 8:30am to Thursday, 3/30/23 @ 8:45am since Dr. Hinkle will be in procedures on 4/6/23.  Tato

## 2023-03-30 ENCOUNTER — OFFICE VISIT (OUTPATIENT)
Dept: SURGERY | Facility: CLINIC | Age: 83
End: 2023-03-30
Payer: MEDICARE

## 2023-03-30 VITALS
WEIGHT: 165.13 LBS | DIASTOLIC BLOOD PRESSURE: 67 MMHG | TEMPERATURE: 97 F | HEIGHT: 63 IN | SYSTOLIC BLOOD PRESSURE: 144 MMHG | BODY MASS INDEX: 29.26 KG/M2 | HEART RATE: 89 BPM

## 2023-03-30 DIAGNOSIS — D09.9 SQUAMOUS CELL CARCINOMA IN SITU: Primary | ICD-10-CM

## 2023-03-30 PROCEDURE — 99213 OFFICE O/P EST LOW 20 MIN: CPT | Mod: S$PBB,,, | Performed by: SURGERY

## 2023-03-30 PROCEDURE — 99999 PR PBB SHADOW E&M-EST. PATIENT-LVL III: CPT | Mod: PBBFAC,,, | Performed by: SURGERY

## 2023-03-30 PROCEDURE — 99213 PR OFFICE/OUTPT VISIT, EST, LEVL III, 20-29 MIN: ICD-10-PCS | Mod: S$PBB,,, | Performed by: SURGERY

## 2023-03-30 PROCEDURE — 99999 PR PBB SHADOW E&M-EST. PATIENT-LVL III: ICD-10-PCS | Mod: PBBFAC,,, | Performed by: SURGERY

## 2023-03-30 PROCEDURE — 99213 OFFICE O/P EST LOW 20 MIN: CPT | Mod: PBBFAC,PO | Performed by: SURGERY

## 2023-03-30 NOTE — PROGRESS NOTES
Pleasant 83-year-old female presents for follow-up of a perianal lesion.  Patient had a high-grade dysplastic lesion removed the left posterior region of her perianal region.  Margins were positive.  Given her age and medical comorbidities she elected to proceed with observation as opposed to re-excision.  This my opinion was a reasonable decision.  She returns for follow-up.  She denies any leakage.  No pain or pressure no discomfort.  No palpable lesions per her    Afebrile vital signs stable  Awake alert orient x3   Soft nontender nondistended  Rectal:  External exam demonstrates scar in the left posterior region.  No palpable or visible abnormality is noted.  Digital rectal exam with  normal sphincter tone.  Anoscopy is not performed.    Assessment plan:    Patient doing well.    No evidence of disease  Follow-up with me in 3 months

## 2023-04-13 ENCOUNTER — TELEPHONE (OUTPATIENT)
Dept: SURGERY | Facility: CLINIC | Age: 83
End: 2023-04-13
Payer: MEDICARE

## 2023-04-13 NOTE — TELEPHONE ENCOUNTER
Lmor @ 4:43pm to inform patient that I have to change her appointment time on Thursday, 6/29/23 from 8:45am to the afternoon since Dr. Hinkle is in procedures that morning.  Tato

## 2023-04-13 NOTE — TELEPHONE ENCOUNTER
Jarochoor @ 4:436pm on her Daughter's cell phone to inform patient that I have to change her appointment time on Thursday, 6/29/23 from 8:45am to the afternoon since Dr. Hinkle is in procedures that morning.  Tato

## 2023-05-01 DIAGNOSIS — R13.10 DYSPHAGIA, UNSPECIFIED TYPE: ICD-10-CM

## 2023-05-01 DIAGNOSIS — R05.3 CHRONIC COUGH: ICD-10-CM

## 2023-05-01 RX ORDER — FLUTICASONE PROPIONATE 50 MCG
SPRAY, SUSPENSION (ML) NASAL
Qty: 48 G | Refills: 2 | Status: SHIPPED | OUTPATIENT
Start: 2023-05-01

## 2023-05-01 NOTE — TELEPHONE ENCOUNTER
Refill Decision Note      Refill Decision Note   Christa Tate  is requesting a refill authorization.  Brief Assessment and Rationale for Refill:  Approve     Medication Therapy Plan:         Comments:     Note composed:4:43 AM 05/01/2023             Appointments     Last Visit   1/17/2023 Jonah Stone MD   Next Visit   Visit date not found Jonah Stone MD

## 2023-05-01 NOTE — TELEPHONE ENCOUNTER
No care due was identified.  [facility] Embedded Care Due Messages. Reference number: [id number].   5/01/2023 3:31:19 AM CDT

## 2023-06-22 ENCOUNTER — OFFICE VISIT (OUTPATIENT)
Dept: SURGERY | Facility: CLINIC | Age: 83
End: 2023-06-22
Payer: MEDICARE

## 2023-06-22 VITALS
DIASTOLIC BLOOD PRESSURE: 69 MMHG | HEIGHT: 63 IN | TEMPERATURE: 98 F | SYSTOLIC BLOOD PRESSURE: 143 MMHG | HEART RATE: 78 BPM | WEIGHT: 167.13 LBS | BODY MASS INDEX: 29.61 KG/M2

## 2023-06-22 DIAGNOSIS — Z86.007 HISTORY OF SQUAMOUS CELL CARCINOMA IN SITU (SCCIS) OF SKIN: Primary | ICD-10-CM

## 2023-06-22 PROCEDURE — 99213 PR OFFICE/OUTPT VISIT, EST, LEVL III, 20-29 MIN: ICD-10-PCS | Mod: S$PBB,25,, | Performed by: SURGERY

## 2023-06-22 PROCEDURE — 99999 PR PBB SHADOW E&M-EST. PATIENT-LVL III: CPT | Mod: PBBFAC,,, | Performed by: SURGERY

## 2023-06-22 PROCEDURE — 46600 PR DIAG2STIC A2SCOPY: ICD-10-PCS | Mod: S$PBB,,, | Performed by: SURGERY

## 2023-06-22 PROCEDURE — 46600 DIAGNOSTIC ANOSCOPY SPX: CPT | Mod: S$PBB,,, | Performed by: SURGERY

## 2023-06-22 PROCEDURE — 99213 OFFICE O/P EST LOW 20 MIN: CPT | Mod: S$PBB,25,, | Performed by: SURGERY

## 2023-06-22 PROCEDURE — 46600 DIAGNOSTIC ANOSCOPY SPX: CPT | Mod: PBBFAC,PO | Performed by: SURGERY

## 2023-06-22 PROCEDURE — 99999 PR PBB SHADOW E&M-EST. PATIENT-LVL III: ICD-10-PCS | Mod: PBBFAC,,, | Performed by: SURGERY

## 2023-06-22 PROCEDURE — 99213 OFFICE O/P EST LOW 20 MIN: CPT | Mod: PBBFAC,PO | Performed by: SURGERY

## 2023-06-22 NOTE — PROGRESS NOTES
84 yo F whom I am familiar with.  She is s/p excision of perianal lesion from L posterior region in 12/22.   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  No invasive cancer present  Pt elected not to pursue reexcision, choosing to have close f/u.      Presents for evaluation    AFVSS  AAox3  Soft/nt/nd  Rectal:  Examination is performed.  Scarring is noted in the left posterior region.  No visible masses appreciated.  Areas soft and supple with no palpable lesions.  Digital rectal exam with slightly decreased sphincter tone.  No palpable masses.  Anoscopy is performed with no visible lesions noted.    Assessment: History of squamous cell carcinoma in-situ  In the anal margin.    No evidence disease the present time.  Patient will return to clinic in 3 months for evaluation

## 2023-08-01 ENCOUNTER — OFFICE VISIT (OUTPATIENT)
Dept: FAMILY MEDICINE | Facility: CLINIC | Age: 83
End: 2023-08-01
Payer: MEDICARE

## 2023-08-01 VITALS
BODY MASS INDEX: 29.61 KG/M2 | OXYGEN SATURATION: 99 % | DIASTOLIC BLOOD PRESSURE: 70 MMHG | HEART RATE: 80 BPM | WEIGHT: 167.13 LBS | HEIGHT: 63 IN | SYSTOLIC BLOOD PRESSURE: 130 MMHG

## 2023-08-01 DIAGNOSIS — Z79.899 DRUG THERAPY: ICD-10-CM

## 2023-08-01 DIAGNOSIS — Z23 IMMUNIZATION DUE: Primary | ICD-10-CM

## 2023-08-01 DIAGNOSIS — Z82.71 FAMILY HISTORY OF POLYCYSTIC KIDNEY: ICD-10-CM

## 2023-08-01 DIAGNOSIS — Z78.0 POSTMENOPAUSAL: ICD-10-CM

## 2023-08-01 PROCEDURE — 99999 PR PBB SHADOW E&M-EST. PATIENT-LVL III: CPT | Mod: PBBFAC,,, | Performed by: FAMILY MEDICINE

## 2023-08-01 PROCEDURE — 99214 PR OFFICE/OUTPT VISIT, EST, LEVL IV, 30-39 MIN: ICD-10-PCS | Mod: S$PBB,,, | Performed by: FAMILY MEDICINE

## 2023-08-01 PROCEDURE — 99213 OFFICE O/P EST LOW 20 MIN: CPT | Mod: PBBFAC,PN | Performed by: FAMILY MEDICINE

## 2023-08-01 PROCEDURE — 99214 OFFICE O/P EST MOD 30 MIN: CPT | Mod: S$PBB,,, | Performed by: FAMILY MEDICINE

## 2023-08-01 PROCEDURE — 99999 PR PBB SHADOW E&M-EST. PATIENT-LVL III: ICD-10-PCS | Mod: PBBFAC,,, | Performed by: FAMILY MEDICINE

## 2023-08-01 RX ORDER — CETIRIZINE HYDROCHLORIDE 10 MG/1
10 TABLET ORAL DAILY
COMMUNITY

## 2023-08-01 NOTE — PROGRESS NOTES
i THIS DOCUMENT WAS MADE IN PART WITH VOICE RECOGNITION SOFTWARE.  OCCASIONALLY THIS SOFTWARE WILL MISINTERPRET WORDS OR PHRASES.    Assessment and Plan:    1. Immunization due        2. Postmenopausal  DXA Bone Density Axial Skeleton 1 or more sites      3. Family history of polycystic kidney        4. Drug therapy  Comprehensive Metabolic Panel    CBC Auto Differential    Urinalysis, Reflex to Urine Culture Urine, Clean Catch    Urinalysis Microscopic          Recheck bone scan to monitor osteopenia progress     Monitor for proteinuria or decreased renal function, low threshold for imaging of kidneys with given history of polycystic kidney disease    Counseled on immunizations, patient defers     Other chronic conditions appear to be well controlled    ______________________________________________________________________  Subjective:    Chief Complaint:  Chief Complaint   Patient presents with    Follow-up     F/u had an appointment with Mattie in March         HPI:  Christa is a 83 y.o. 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 Hinkle -- follow-up every 3 months     Overactive bladder   Nocturia 2x nightly   Prev med : Myrbetriq      Allergic rhinitis   Rx - Flonase + Zyrtec  Has air purifier at home      Nicotine dependence   In remission-quit in 2004     Vascular insufficiency      Heartburn   Tums as needed      Osteopenia, vitamin-D deficiency  + Fall risk   Taking 5000 units daily vitamin-D, 500 mg calcium daily  No recent fractures     Situational anxiety  Med-Xanax 0.25 mg daily p.r.n.     Atherosclerosis of aorta  Previous LDL 90    FH  Polycystic Kidney Disease   Father, GF, Sister           Past Medical History:  Past Medical History:   Diagnosis Date    Cataract     OD       Past Surgical History:  Past Surgical History:   Procedure Laterality Date    CATARACT EXTRACTION W/  INTRAOCULAR LENS IMPLANT Left 2017    Dr. Nelson    EXAMINATION UNDER ANESTHESIA N/A 2022    Procedure: Exam under anesthesia;  Surgeon: Dutch Hinkle MD;  Location: [facility] OR;  Service: General;  Laterality: N/A;    RECTAL BIOPSY N/A 2022    Procedure: BIOPSY, RECTUM;  Surgeon: Dutch Hinkle MD;  Location: [facility] OR;  Service: General;  Laterality: N/A;       Family History:  Family History   Problem Relation Age of Onset    Glaucoma Neg Hx     Macular degeneration Neg Hx        Social History:  Social History     Socioeconomic History    Marital status:    Tobacco Use    Smoking status: Former     Current packs/day: 0.00     Types: Cigarettes     Quit date: 2004     Years since quittin.5    Smokeless tobacco: Never   Substance and Sexual Activity    Alcohol use: Not Currently    Drug use: Never     Social Determinants of Health     Financial Resource Strain: Low Risk  (2022)    Overall Financial Resource Strain (CARDIA)     Difficulty of Paying Living Expenses: Not hard at all   Food Insecurity: No Food Insecurity (2022)    Hunger Vital Sign     Worried About Running Out of Food in the Last Year: Never true     Ran Out of Food in the Last Year: Never true   Transportation Needs: No Transportation Needs (2022)    PRAPARE - Transportation     Lack of Transportation (Medical): No     Lack of Transportation (Non-Medical): No   Physical Activity: Insufficiently Active (2022)    Exercise Vital Sign     Days of Exercise per Week: 3 days     Minutes of Exercise per Session: 40 min   Stress: No Stress Concern Present (2022)    [employer] - Occupational Stress Questionnaire     Feeling  of Stress : Only a little   Social Connections: Moderately Isolated (12/7/2022)    Social Connection and Isolation Panel [NHANES]     Frequency of Communication with Friends and Family: More than three times a week     Frequency of Social Gatherings with Friends and Family: Once a week     Attends Hindu Services: More than 4 times per year     Active Member of Clubs or Organizations: No     Attends Club or Organization Meetings: Never     Marital Status:    Housing Stability: Unknown (12/7/2022)    Housing Stability Vital Sign     Unable to Pay for Housing in the Last Year: No     Unstable Housing in the Last Year: No       Medications:  Current Outpatient Medications on File Prior to Visit   Medication Sig Dispense Refill    ALPRAZolam (XANAX) 0.25 MG tablet TAKE 1 TABLET(0.25 MG) BY MOUTH DAILY AS NEEDED FOR ANXIETY 30 tablet 3    calcium carbonate (TUMS) 200 mg calcium (500 mg) chewable tablet Take 1 tablet by mouth once daily.      cetirizine (ZYRTEC) 10 MG tablet Take 10 mg by mouth once daily.      cholecalciferol, vitamin D3, (VITAMIN D3) 125 mcg (5,000 unit) Tab Take 1 tablet (5,000 Units total) by mouth once daily. 90 tablet 3    fluticasone propionate (FLONASE) 50 mcg/actuation nasal spray SHAKE LIQUID AND USE 2 SPRAYS(100 MCG) IN EACH NOSTRIL ONCE DAILY 48 g 2    nystatin (MYCOSTATIN) powder Apply topically 2 (two) times daily. 60 g 2    polyethylene glycol (GLYCOLAX) 17 gram PwPk Take 17 g by mouth once daily at 6am.      meloxicam (MOBIC) 15 MG tablet Take 15 mg by mouth once daily.      [DISCONTINUED] HYDROcodone-acetaminophen (NORCO) 5-325 mg per tablet Take 1 tablet by mouth every 6 (six) hours as needed for Pain. (Patient not taking: Reported on 3/30/2023) 30 tablet 0    [DISCONTINUED] loratadine (CLARITIN) 10 mg tablet Take 1 tablet (10 mg total) by mouth once daily. (Patient not taking: Reported on 6/22/2023) 30 tablet 2    [DISCONTINUED] ondansetron (ZOFRAN-ODT) 4 MG TbDL Take 1 tablet  "(4 mg total) by mouth every 8 (eight) hours as needed (nausea). (Patient not taking: Reported on 12/21/2022) 10 tablet 0     Current Facility-Administered Medications on File Prior to Visit   Medication Dose Route Frequency Provider Last Rate Last Admin    electrolyte-S (ISOLYTE)   Intravenous Continuous Torsten Lao MD        fentaNYL 50 mcg/mL injection 25 mcg  25 mcg Intravenous Q5 Min PRN Torsten Lao MD        HYDROmorphone (PF) injection 0.2 mg  0.2 mg Intravenous Q5 Min PRN Torsten Lao MD        lactated ringers infusion   Intravenous Continuous Torsten Lao MD 10 mL/hr at 12/28/22 0930 Restarted at 12/28/22 1040    LIDOcaine (PF) 10 mg/ml (1%) injection 10 mg  1 mL Intradermal Once Torsten Lao MD        oxyCODONE immediate release tablet 5 mg  5 mg Oral Q3H PRN Torsten Lao MD           Allergies:  Metal staples [surgical stainless steel], Penicillins, and Levofloxacin    Immunizations:  Immunization History   Administered Date(s) Administered    COVID-19, MRNA, LN-S, PF (MODERNA FULL 0.5 ML DOSE) 02/18/2021, 03/18/2021    Tdap 05/15/2019       Review of Systems:  Review of Systems   All other systems reviewed and are negative.      Objective:    Vitals:  Vitals:    08/01/23 1352   BP: 130/70   Pulse: 80   SpO2: 99%   Weight: 75.8 kg (167 lb 1.7 oz)   Height: 5' 3" (1.6 m)   PainSc: 0-No pain       Physical Exam  Vitals reviewed.   Constitutional:       General: She is not in acute distress.  HENT:      Head: Normocephalic and atraumatic.   Eyes:      Pupils: Pupils are equal, round, and reactive to light.   Cardiovascular:      Rate and Rhythm: Normal rate and regular rhythm.      Heart sounds: No murmur heard.     No friction rub.   Pulmonary:      Effort: Pulmonary effort is normal.      Breath sounds: Normal breath sounds.   Abdominal:      General: Bowel sounds are normal. There is no distension.      Palpations: Abdomen is soft.      Tenderness: There is no abdominal tenderness. "   Musculoskeletal:      Cervical back: Neck supple.   Skin:     General: Skin is warm and dry.      Findings: No rash.   Psychiatric:         Behavior: Behavior normal.           Jonah Stone MD  Family Medicine

## 2023-08-30 ENCOUNTER — TELEPHONE (OUTPATIENT)
Dept: DERMATOLOGY | Facility: CLINIC | Age: 83
End: 2023-08-30
Payer: MEDICARE

## 2023-08-30 NOTE — TELEPHONE ENCOUNTER
----- Message from Sherry Ambrocio sent at 8/30/2023  9:15 AM CDT -----  Contact: pt [phone]  Type: Needs Medical Advice  Who Called:  Pt     Best Call Back Number: [phone]    Additional Information: Pt calling to schedule a NP appt for rash under breast. Pls call back and advise

## 2023-09-11 ENCOUNTER — TELEPHONE (OUTPATIENT)
Dept: DERMATOLOGY | Facility: CLINIC | Age: 83
End: 2023-09-11
Payer: MEDICARE

## 2023-09-11 NOTE — TELEPHONE ENCOUNTER
----- Message from Norma Che sent at 9/11/2023  9:39 AM CDT -----  Regarding: sooner apt  Contact: Patient  Type:  Sooner Appointment Request    Caller is requesting a sooner appointment.  Caller declined first available appointment listed below.  Caller will not accept being placed on the waitlist and is requesting a message be sent to doctor.    Name of Caller:  Patient  When is the first available appointment?    Symptoms:  spot under arm in skin  Best Call Back Number:  [phone]    Additional Information:  Patient would like to be seen as soon as possible thanks!

## 2023-09-14 ENCOUNTER — OFFICE VISIT (OUTPATIENT)
Dept: SURGERY | Facility: CLINIC | Age: 83
End: 2023-09-14
Payer: MEDICARE

## 2023-09-14 VITALS
TEMPERATURE: 97 F | HEIGHT: 63 IN | HEART RATE: 85 BPM | DIASTOLIC BLOOD PRESSURE: 67 MMHG | BODY MASS INDEX: 29.34 KG/M2 | WEIGHT: 165.56 LBS | SYSTOLIC BLOOD PRESSURE: 132 MMHG

## 2023-09-14 DIAGNOSIS — Z86.007 HISTORY OF SQUAMOUS CELL CARCINOMA IN SITU (SCCIS) OF SKIN: Primary | ICD-10-CM

## 2023-09-14 PROCEDURE — 99999 PR PBB SHADOW E&M-EST. PATIENT-LVL III: ICD-10-PCS | Mod: PBBFAC,,, | Performed by: SURGERY

## 2023-09-14 PROCEDURE — 99213 PR OFFICE/OUTPT VISIT, EST, LEVL III, 20-29 MIN: ICD-10-PCS | Mod: S$PBB,,, | Performed by: SURGERY

## 2023-09-14 PROCEDURE — 99999 PR PBB SHADOW E&M-EST. PATIENT-LVL III: CPT | Mod: PBBFAC,,, | Performed by: SURGERY

## 2023-09-14 PROCEDURE — 99213 OFFICE O/P EST LOW 20 MIN: CPT | Mod: S$PBB,,, | Performed by: SURGERY

## 2023-09-14 PROCEDURE — 99213 OFFICE O/P EST LOW 20 MIN: CPT | Mod: PBBFAC,PO | Performed by: SURGERY

## 2023-09-14 NOTE — PROGRESS NOTES
84 yo F whom I am familiar with.  She is s/p excision of perianal lesion from L posterior region in 12/22.   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  No invasive cancer present  Pt elected not to pursue reexcision, choosing to have close f/u.  Pt doing well.  No complaints or discomfort    AFVSS  AAox3  Soft/nt/nd  No resp distress  Rectal: Ext exam with no significant lesions.  ALEX with normal sphincter tone.       A/P: Hx of sq cell carcinoma in situ  SUSHMA  RTC in 3 months

## 2023-09-18 ENCOUNTER — PATIENT OUTREACH (OUTPATIENT)
Dept: ADMINISTRATIVE | Facility: HOSPITAL | Age: 83
End: 2023-09-18
Payer: MEDICARE

## 2023-10-25 ENCOUNTER — OFFICE VISIT (OUTPATIENT)
Dept: FAMILY MEDICINE | Facility: CLINIC | Age: 83
End: 2023-10-25
Payer: MEDICARE

## 2023-10-25 ENCOUNTER — HOSPITAL ENCOUNTER (OUTPATIENT)
Dept: RADIOLOGY | Facility: HOSPITAL | Age: 83
Discharge: HOME | End: 2023-10-25
Payer: MEDICARE

## 2023-10-25 VITALS
DIASTOLIC BLOOD PRESSURE: 78 MMHG | BODY MASS INDEX: 28.77 KG/M2 | HEART RATE: 76 BPM | SYSTOLIC BLOOD PRESSURE: 120 MMHG | OXYGEN SATURATION: 97 % | HEIGHT: 63 IN | WEIGHT: 162.38 LBS

## 2023-10-25 DIAGNOSIS — K21.9 GASTROESOPHAGEAL REFLUX DISEASE, UNSPECIFIED WHETHER ESOPHAGITIS PRESENT: ICD-10-CM

## 2023-10-25 DIAGNOSIS — R05.9 COUGH, UNSPECIFIED TYPE: ICD-10-CM

## 2023-10-25 DIAGNOSIS — R05.9 COUGH, UNSPECIFIED TYPE: Primary | ICD-10-CM

## 2023-10-25 LAB
CTP QC/QA: YES
MOLECULAR STREP A: NEGATIVE
POC MOLECULAR INFLUENZA A AGN: NEGATIVE
POC MOLECULAR INFLUENZA B AGN: NEGATIVE
SARS-COV-2 RDRP RESP QL NAA+PROBE: NEGATIVE

## 2023-10-25 PROCEDURE — 71046 XR CHEST PA AND LATERAL: ICD-10-PCS | Mod: 26,,, | Performed by: RADIOLOGY

## 2023-10-25 PROCEDURE — 99999 PR PBB SHADOW E&M-EST. PATIENT-LVL III: CPT | Mod: PBBFAC,,,

## 2023-10-25 PROCEDURE — 99999 PR PBB SHADOW E&M-EST. PATIENT-LVL III: ICD-10-PCS | Mod: PBBFAC,,,

## 2023-10-25 PROCEDURE — 99214 OFFICE O/P EST MOD 30 MIN: CPT | Mod: S$PBB,,,

## 2023-10-25 PROCEDURE — 71046 X-RAY EXAM CHEST 2 VIEWS: CPT | Mod: TC,PN

## 2023-10-25 PROCEDURE — 99999PBSHW POCT INFLUENZA A/B MOLECULAR: Mod: PBBFAC,,,

## 2023-10-25 PROCEDURE — 87635 SARS-COV-2 COVID-19 AMP PRB: CPT | Mod: PBBFAC,PN

## 2023-10-25 PROCEDURE — 71046 X-RAY EXAM CHEST 2 VIEWS: CPT | Mod: 26,,, | Performed by: RADIOLOGY

## 2023-10-25 PROCEDURE — 87502 INFLUENZA DNA AMP PROBE: CPT | Mod: PBBFAC,PN

## 2023-10-25 PROCEDURE — 87651 STREP A DNA AMP PROBE: CPT | Mod: PBBFAC,PN

## 2023-10-25 PROCEDURE — 99214 PR OFFICE/OUTPT VISIT, EST, LEVL IV, 30-39 MIN: ICD-10-PCS | Mod: S$PBB,,,

## 2023-10-25 PROCEDURE — 99999PBSHW: Mod: PBBFAC,,,

## 2023-10-25 PROCEDURE — 99999PBSHW POCT STREP A MOLECULAR: Mod: PBBFAC,,,

## 2023-10-25 PROCEDURE — 99213 OFFICE O/P EST LOW 20 MIN: CPT | Mod: PBBFAC,25,PN

## 2023-10-25 PROCEDURE — 99999PBSHW POCT STREP A MOLECULAR: ICD-10-PCS | Mod: PBBFAC,,,

## 2023-10-25 RX ORDER — PANTOPRAZOLE SODIUM 40 MG/1
40 TABLET, DELAYED RELEASE ORAL DAILY
Qty: 30 TABLET | Refills: 2 | Status: SHIPPED | OUTPATIENT
Start: 2023-10-25 | End: 2024-10-24

## 2023-10-25 NOTE — PROGRESS NOTES
[facility]  [address] [place] [address]    Subjective    Chief Complaint:   Chief Complaint   Patient presents with    Follow-up     Cough, sore throat , runny nose , no appiette, fatigue, 4 days        History of Present Illness:     Christa Tate is a(n) 83 y.o. female with past medical history as noted below who presents to the clinic today for above symptoms.    4 day onset cough, sore throat, nasal congestion, anorexia, and fatigue. No known sick contacts. No CP or SOB. She has a history of chronic sinusitis but otherwise no lung disease.     Reports heartburn refractory to OTC antacids. No recent abd pain, melena, or other GI symptoms.         Problem List:   Patient Active Problem List   Diagnosis    Posterior vitreous detachment    Nuclear sclerosis    Chronic constipation    Chronic sinusitis    Osteopenia    Vascular insufficiency    Vitamin D insufficiency    OAB (overactive bladder)    Peripheral edema    Accumulation of fluid in tissues    Allergic rhinitis    Altered urinary elimination pattern    Neoplasm of skin    OP (osteoporosis)    Atherosclerosis of aorta    Rectal mass    Squamous cell carcinoma in situ    HPV (human papilloma virus) anogenital infection    Family history of polycystic kidney       Current Outpatient Medications:   Current Outpatient Medications   Medication Instructions    ALPRAZolam (XANAX) 0.25 MG tablet TAKE 1 TABLET(0.25 MG) BY MOUTH DAILY AS NEEDED FOR ANXIETY    calcium carbonate (TUMS) 200 mg calcium (500 mg) chewable tablet 1 tablet, Oral, Daily    cetirizine (ZYRTEC) 10 mg, Oral, Daily    cholecalciferol (vitamin D3) (VITAMIN D3) 5,000 Units, Oral, Daily    fluticasone propionate (FLONASE) 50 mcg/actuation nasal spray SHAKE LIQUID AND USE 2 SPRAYS(100 MCG) IN EACH NOSTRIL ONCE DAILY    meloxicam (MOBIC) 15 mg, Oral, Daily    nystatin (MYCOSTATIN) powder Topical (Top), 2 times daily    polyethylene  "glycol (GLYCOLAX) 17 g, Oral, Daily       Surgical History:   Past Surgical History:   Procedure Laterality Date    CATARACT EXTRACTION W/  INTRAOCULAR LENS IMPLANT Left 09/27/2017    Dr. Nelson    EXAMINATION UNDER ANESTHESIA N/A 12/28/2022    Procedure: Exam under anesthesia;  Surgeon: Dutch Hinkle MD;  Location: [facility] OR;  Service: General;  Laterality: N/A;    RECTAL BIOPSY N/A 12/28/2022    Procedure: BIOPSY, RECTUM;  Surgeon: Dutch Hinkle MD;  Location: [facility] OR;  Service: General;  Laterality: N/A;       Family History:   Family History   Problem Relation Age of Onset    Glaucoma Neg Hx     Macular degeneration Neg Hx        Allergies:   Review of patient's allergies indicates:   Allergen Reactions    Metal staples [surgical stainless steel]      Pt states "any type of metal"    Penicillins     Levofloxacin Rash       Tobacco Status:   Tobacco Use: Medium Risk (10/25/2023)    Patient History     Smoking Tobacco Use: Former     Smokeless Tobacco Use: Never     Passive Exposure: Not on file       Sexual Activity:   Social History     Substance and Sexual Activity   Sexual Activity Not on file       Alcohol Use:   Social History     Substance and Sexual Activity   Alcohol Use Not Currently         Objective       Vitals:    10/25/23 1412   BP: 120/78   Pulse: 76   SpO2: 97%   Weight: 73.6 kg (162 lb 5.9 oz)   Height: 5' 3" (1.6 m)       Review of Systems   Constitutional:  Positive for malaise/fatigue. Negative for chills and fever.   HENT:  Positive for congestion and sore throat.    Respiratory:  Positive for cough and sputum production (clear). Negative for shortness of breath.    Cardiovascular:  Negative for chest pain.   Gastrointestinal:  Positive for heartburn. Negative for abdominal pain, blood in stool, constipation, diarrhea, melena, nausea and vomiting.       Physical Exam  Constitutional:       General: She is not in acute distress.     Appearance: Normal appearance.   HENT:      Head: " Normocephalic and atraumatic.      Nose: Nose normal.      Mouth/Throat:      Mouth: Mucous membranes are moist.      Pharynx: Oropharynx is clear. No oropharyngeal exudate or posterior oropharyngeal erythema.   Eyes:      Pupils: Pupils are equal, round, and reactive to light.   Cardiovascular:      Rate and Rhythm: Normal rate and regular rhythm.      Heart sounds: Normal heart sounds. No murmur heard.  Pulmonary:      Effort: Pulmonary effort is normal. No respiratory distress.      Breath sounds: Normal breath sounds. No wheezing.   Musculoskeletal:      Cervical back: Normal range of motion.   Skin:     General: Skin is warm.   Neurological:      Mental Status: She is alert and oriented to person, place, and time.   Psychiatric:         Behavior: Behavior normal.           Assessment and Plan:    1. Cough, unspecified type  -     POCT COVID-19 Rapid Screening  -     POCT Influenza A/B Molecular  -     POCT Strep A, Molecular  -     X-Ray Chest PA And Lateral; Future; Expected date: 10/25/2023    2. Gastroesophageal reflux disease, unspecified whether esophagitis present  -     pantoprazole (PROTONIX) 40 MG tablet; Take 1 tablet (40 mg total) by mouth once daily.  Dispense: 30 tablet; Refill: 2        Visit summary:    Christa Tate presented today for productive cough and other URI symptoms.    Presentation consistent with viral upper respiratory illness. Above test(s) negative today. Obtained CXR today with patient's age as major risk factor for pneumonia, imaging without consolidation or other concerning findings.    Continue to rest and stay well hydrated. Recommended over-the-counter Mucinex DM for congestion.     I do not suspect a bacterial cause of symptoms today. Patient was instructed to follow up if symptoms do not improve or worsen and to report to ER with severe symptoms.     Uncontrolled GERD, no red flag symptoms, prescribed pantoprazole     Patient was instructed to report to ER if  symptoms become severe.    Follow up: if symptoms worsen or fail to improve      Caryn Keith PA-C    This note was created partially with voice dictation software and is prone to errors. This note has been reviewed by me but some errors are inevitable.

## 2023-12-20 ENCOUNTER — TELEPHONE (OUTPATIENT)
Dept: SURGERY | Facility: CLINIC | Age: 83
End: 2023-12-20
Payer: MEDICARE

## 2023-12-20 NOTE — TELEPHONE ENCOUNTER
I called patient to inform her that I have to reschedule her appointment on Thursday, 12/28/23 @ 2:15pm due to Dr. Hinkle doing surgery.  Patient stated that she couldn't get an appointment until 1 month for her being sick with a cold.  I informed her to call her PCP for medical advice of what she can take. She stated that she took Tylenol.  I rescheduled her on Thursday, 1/4/24 @ 8:30am with Dr. Hinkle in [place].  Tato

## 2024-01-11 DIAGNOSIS — Z00.00 ENCOUNTER FOR MEDICARE ANNUAL WELLNESS EXAM: ICD-10-CM

## 2024-01-18 ENCOUNTER — OFFICE VISIT (OUTPATIENT)
Dept: SURGERY | Facility: CLINIC | Age: 84
End: 2024-01-18
Payer: MEDICARE

## 2024-01-18 VITALS
HEART RATE: 88 BPM | SYSTOLIC BLOOD PRESSURE: 137 MMHG | TEMPERATURE: 98 F | HEIGHT: 63 IN | WEIGHT: 160.69 LBS | DIASTOLIC BLOOD PRESSURE: 67 MMHG | BODY MASS INDEX: 28.47 KG/M2

## 2024-01-18 DIAGNOSIS — Z86.007 HISTORY OF SQUAMOUS CELL CARCINOMA IN SITU (SCCIS) OF SKIN: Primary | ICD-10-CM

## 2024-01-18 PROCEDURE — 99213 OFFICE O/P EST LOW 20 MIN: CPT | Mod: S$PBB,25,, | Performed by: SURGERY

## 2024-01-18 PROCEDURE — 46600 DIAGNOSTIC ANOSCOPY SPX: CPT | Mod: PBBFAC,PO | Performed by: SURGERY

## 2024-01-18 PROCEDURE — 46600 DIAGNOSTIC ANOSCOPY SPX: CPT | Mod: S$PBB,,, | Performed by: SURGERY

## 2024-01-18 NOTE — PROGRESS NOTES
84 yo F whom I am familiar with.  She is s/p excision of perianal lesion from L posterior region in 12/22.   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  No invasive cancer present  Pt elected not to pursue reexcision, choosing to have close f/u.  Pt doing well.  No complaints or discomfort             AFVSS  AAOx3  Soft/nt/nd  Rectal:  External exam does reveal scar in the left posterior region.  No evidence of recurrent lesion is noted.  Digital rectal exam with normal sphincter tone.  Anoscopy performed with no visible lesions noted.      Assessment plan:  History of squamous cell carcinoma in-situ of the anal margin     Patient doing well.  No evidence of disease.    Patient will return to clinic in 3 months for evaluation

## 2024-03-04 ENCOUNTER — NURSE TRIAGE (OUTPATIENT)
Dept: ADMINISTRATIVE | Facility: CLINIC | Age: 84
End: 2024-03-04
Payer: MEDICARE

## 2024-03-04 ENCOUNTER — TELEPHONE (OUTPATIENT)
Dept: FAMILY MEDICINE | Facility: CLINIC | Age: 84
End: 2024-03-04
Payer: MEDICARE

## 2024-03-04 NOTE — TELEPHONE ENCOUNTER
----- Message from Bernice Eaton sent at 3/4/2024  8:31 AM CST -----  Type: Needs Medical Advice  Who Called:  patient  Symptoms (please be specific):  chest pain, leg tingling  How long has patient had these symptoms:  yesterday  Pharmacy name and phone #:    Best Call Back Number: [phone] (home)   Additional Information: Symptoms: Chest Pain - Adult, Leg Swelling - Not From Injury  Outcome: Instruct patient to Call 911 NOW!  Reason: This is the only possible outcome for these symptoms    The caller rejected this outcome

## 2024-03-04 NOTE — TELEPHONE ENCOUNTER
Attempted to reach pt , left voicemail that pt needs to call 911 and go to ER to be evaluated .- Spoke w/ pt daughter, she is on her way to get the pt and bring her to the ER . She reports pt has been having chest pains x 2 days -lp

## 2024-03-04 NOTE — TELEPHONE ENCOUNTER
Spoke with patient who states she has been having constant chest pain (in the middle) since yesterday.  Patient also states she has pain, burning and tingling in the left leg.  Patient states she has poor circulation in her legs.  Advised EMS-[id number] per protocol.  Patient states her daughter comes at 11 am today.  Dispo reiterated to call EMS-911 now.  Patient then stated she could drive her self.  Advised EMS-911 once again Patient verbalized understanding.    Reason for Disposition   Chest pain lasting longer than 5 minutes and over 44 years old     Constant chest pain since yesterday    Additional Information   Negative: SEVERE difficulty breathing (e.g., struggling for each breath, speaks in single words)   Negative: Difficult to awaken or acting confused (e.g., disoriented, slurred speech)   Negative: Shock suspected (e.g., cold/pale/clammy skin, too weak to stand, low BP, rapid pulse)   Negative: Passed out (i.e., lost consciousness, collapsed and was not responding)    Protocols used: Chest Pain-A-[place]

## 2024-03-08 ENCOUNTER — TELEPHONE (OUTPATIENT)
Dept: FAMILY MEDICINE | Facility: CLINIC | Age: 84
End: 2024-03-08
Payer: MEDICARE

## 2024-03-08 DIAGNOSIS — H54.7 DECREASED VISUAL ACUITY: Primary | ICD-10-CM

## 2024-03-08 NOTE — TELEPHONE ENCOUNTER
"----- Message from Florian Le sent at 3/8/2024  9:15 AM CST -----  Consult/Advisory:      Name Of Caller: Dayana (Daughter)      Contact Preference?:  [phone]      What is the nature of the call?: Requesting referral to see Dr. Kirby (Ophthalmology) for cataract eval       Additional Notes:  "Thank you for all that you do for our patients"         "

## 2024-03-20 ENCOUNTER — TELEPHONE (OUTPATIENT)
Dept: FAMILY MEDICINE | Facility: CLINIC | Age: 84
End: 2024-03-20
Payer: MEDICARE

## 2024-03-20 ENCOUNTER — TELEPHONE (OUTPATIENT)
Dept: SURGERY | Facility: CLINIC | Age: 84
End: 2024-03-20
Payer: MEDICARE

## 2024-03-20 ENCOUNTER — OFFICE VISIT (OUTPATIENT)
Dept: FAMILY MEDICINE | Facility: CLINIC | Age: 84
End: 2024-03-20
Payer: MEDICARE

## 2024-03-20 VITALS
DIASTOLIC BLOOD PRESSURE: 78 MMHG | WEIGHT: 156.31 LBS | RESPIRATION RATE: 18 BRPM | OXYGEN SATURATION: 96 % | SYSTOLIC BLOOD PRESSURE: 122 MMHG | HEIGHT: 63 IN | BODY MASS INDEX: 27.7 KG/M2 | HEART RATE: 89 BPM

## 2024-03-20 DIAGNOSIS — R05.9 COUGH, UNSPECIFIED TYPE: ICD-10-CM

## 2024-03-20 DIAGNOSIS — R10.13 EPIGASTRIC PAIN: ICD-10-CM

## 2024-03-20 DIAGNOSIS — J02.9 SORE THROAT: ICD-10-CM

## 2024-03-20 DIAGNOSIS — J02.9 PHARYNGITIS, UNSPECIFIED ETIOLOGY: Primary | ICD-10-CM

## 2024-03-20 PROCEDURE — 99214 OFFICE O/P EST MOD 30 MIN: CPT | Mod: PBBFAC,PN | Performed by: NURSE PRACTITIONER

## 2024-03-20 PROCEDURE — 99214 OFFICE O/P EST MOD 30 MIN: CPT | Mod: S$PBB,,, | Performed by: NURSE PRACTITIONER

## 2024-03-20 PROCEDURE — 87635 SARS-COV-2 COVID-19 AMP PRB: CPT | Mod: PBBFAC,PN | Performed by: NURSE PRACTITIONER

## 2024-03-20 PROCEDURE — 87651 STREP A DNA AMP PROBE: CPT | Mod: PBBFAC,PN | Performed by: NURSE PRACTITIONER

## 2024-03-20 PROCEDURE — 99999PBSHW: Mod: PBBFAC,,,

## 2024-03-20 PROCEDURE — 99999 PR PBB SHADOW E&M-EST. PATIENT-LVL IV: CPT | Mod: PBBFAC,,, | Performed by: NURSE PRACTITIONER

## 2024-03-20 PROCEDURE — 99999PBSHW POCT STREP A MOLECULAR: Mod: PBBFAC,,,

## 2024-03-20 PROCEDURE — 99999PBSHW POCT INFLUENZA A/B MOLECULAR: Mod: PBBFAC,,,

## 2024-03-20 PROCEDURE — 87502 INFLUENZA DNA AMP PROBE: CPT | Mod: PBBFAC,PN | Performed by: NURSE PRACTITIONER

## 2024-03-20 RX ORDER — METRONIDAZOLE 250 MG/1
500 TABLET ORAL 2 TIMES DAILY
COMMUNITY
Start: 2024-03-20 | End: 2024-04-12

## 2024-03-20 RX ORDER — BISMUTH SUBCITRATE POTASSIUM, METRONIDAZOLE AND TETRACYCLINE HYDROCHLORIDE 140; 125; 125 MG/1; MG/1; MG/1
3 CAPSULE ORAL 4 TIMES DAILY
COMMUNITY
Start: 2024-03-15 | End: 2024-04-12

## 2024-03-20 RX ORDER — DOXYCYCLINE 100 MG/10ML
INJECTION, POWDER, LYOPHILIZED, FOR SOLUTION INTRAVENOUS
COMMUNITY
Start: 2024-03-20 | End: 2024-03-20

## 2024-03-20 NOTE — PROGRESS NOTES
THIS DOCUMENT WAS MADE IN PART WITH VOICE RECOGNITION SOFTWARE.  OCCASIONALLY THIS SOFTWARE WILL MISINTERPRET WORDS OR PHRASES.     Assessment and Plan:        Pharyngitis, unspecified etiology  Comments:  Treat symptoms as discussed.   Will try Cepacol throat spray, lozenges    Cough, unspecified type  -     POCT COVID-19 Rapid Screening  -     POCT Influenza A/B Molecular    Sore throat  -     POCT Strep A, Molecular    Epigastric pain       Recommend to avoid large meals, avoid eating within 3 hours of bedtime, elevate head of bed if nocturnal symptoms are present, smoking cessation (if current smoker), & weight loss (if overweight).   Recommend minimize/avoid high-fat foods, chocolate, caffeine, citrus, alcohol, & tomato products.  Advised to avoid/limit use of NSAID's, since they can cause GI upset, bleeding, and/or ulcers. If needed, take with food.    Advised patient to follow up with GI.  Advised to complete H pylori treatment  (Plyera) as prescribed by Dr. Epps.  Records requested from GI/[facility].  Emergent conditions discussed.    Follow up if symptoms worsen or fail to improve.     ______________________________________________________________________  Subjective:    Chief Complaint:  Flu-like symptoms    HPI:  Christa is a 83 y.o. year old female here patient complaining of cough, sore throat and body aches x2 days.  Patient reports cough is nonproductive and occurs primarily when she lays down due to postnasal drip.  Patient reports seasonal allergies and takes Zyrtec daily.     Also seen in ER at [facility] on 3/4/24 for epigastric pain- referred to GI-     Pt reports seeing Dr Epps- had Upper endoscopy on 3/11/24 with biopsy dx with H.pylori -prescribed - has been unable to start tx due to inablity to swallow pills. She reports that she notified Dr. Epps's office and is awaiting antibiotics liquid form. Patient swallowed medication in clinic without difficulty .           Medications:  Current  Outpatient Medications on File Prior to Visit   Medication Sig Dispense Refill    bismuth-metronidazole-tetracycline (PLYERA) 140-125-125 mg per capsule Take 3 capsules by mouth 4 (four) times daily.      calcium carbonate (TUMS) 200 mg calcium (500 mg) chewable tablet Take 1 tablet by mouth once daily.      cetirizine (ZYRTEC) 10 MG tablet Take 10 mg by mouth once daily.      cholecalciferol, vitamin D3, (VITAMIN D3) 125 mcg (5,000 unit) Tab Take 1 tablet (5,000 Units total) by mouth once daily. 90 tablet 3    ALPRAZolam (XANAX) 0.25 MG tablet TAKE 1 TABLET(0.25 MG) BY MOUTH DAILY AS NEEDED FOR ANXIETY (Patient not taking: Reported on 9/14/2023) 30 tablet 3    fluticasone propionate (FLONASE) 50 mcg/actuation nasal spray SHAKE LIQUID AND USE 2 SPRAYS(100 MCG) IN EACH NOSTRIL ONCE DAILY (Patient not taking: Reported on 1/18/2024) 48 g 2    metroNIDAZOLE (FLAGYL) 250 MG tablet Take 500 mg by mouth 2 (two) times daily.      nystatin (MYCOSTATIN) powder Apply topically 2 (two) times daily. (Patient not taking: Reported on 10/25/2023) 60 g 2    pantoprazole (PROTONIX) 40 MG tablet Take 1 tablet (40 mg total) by mouth once daily. (Patient not taking: Reported on 1/18/2024) 30 tablet 2    polyethylene glycol (GLYCOLAX) 17 gram PwPk Take 17 g by mouth once daily at 6am.      [DISCONTINUED] doxycycline 100 mg injection SMARTSIG:By Mouth      [DISCONTINUED] meloxicam (MOBIC) 15 MG tablet Take 15 mg by mouth once daily.       Current Facility-Administered Medications on File Prior to Visit   Medication Dose Route Frequency Provider Last Rate Last Admin    electrolyte-S (ISOLYTE)   Intravenous Continuous Torsten Lao MD        fentaNYL 50 mcg/mL injection 25 mcg  25 mcg Intravenous Q5 Min PRN Torsten Lao MD        HYDROmorphone (PF) injection 0.2 mg  0.2 mg Intravenous Q5 Min PRN Torsten Lao MD        lactated ringers infusion   Intravenous Continuous Torsten Lao MD 10 mL/hr at 12/28/22 0930 Restarted at  "12/28/22 1040    LIDOcaine (PF) 10 mg/ml (1%) injection 10 mg  1 mL Intradermal Once Torsten Lao MD        oxyCODONE immediate release tablet 5 mg  5 mg Oral Q3H PRN Torsten Lao MD           Review of Systems:  Review of Systems   Constitutional:  Negative for chills, fatigue and fever.   HENT:  Positive for postnasal drip (clear), rhinorrhea and sore throat. Negative for congestion.    Eyes:  Negative for visual disturbance.   Respiratory:  Positive for cough. Negative for shortness of breath and wheezing.    Cardiovascular:  Negative for chest pain, palpitations and leg swelling.   Gastrointestinal:  Positive for abdominal pain (Epigastric-improved). Negative for vomiting.   Musculoskeletal:  Positive for myalgias.       Past Medical History:  Past Medical History:   Diagnosis Date    Cataract     OD       Objective:    Vitals:  Vitals:    03/20/24 1552   BP: 122/78   Pulse: 89   Resp: 18   SpO2: 96%   Weight: 70.9 kg (156 lb 4.9 oz)   Height: 5' 3" (1.6 m)   PainSc:   6       Physical Exam  Vitals and nursing note reviewed.   Constitutional:       General: She is not in acute distress.     Appearance: She is obese.   HENT:      Head: Normocephalic and atraumatic.      Right Ear: Tympanic membrane, ear canal and external ear normal.      Left Ear: Tympanic membrane, ear canal and external ear normal.      Nose: Rhinorrhea present. No mucosal edema. Rhinorrhea is clear.      Right Turbinates: Not swollen or pale.      Left Turbinates: Not swollen or pale.      Right Sinus: No maxillary sinus tenderness or frontal sinus tenderness.      Left Sinus: No maxillary sinus tenderness or frontal sinus tenderness.      Mouth/Throat:      Mouth: Mucous membranes are moist.      Pharynx: Uvula midline. Posterior oropharyngeal erythema (slight) present. No pharyngeal swelling.   Eyes:      General: No scleral icterus.     Conjunctiva/sclera: Conjunctivae normal.   Neck:      Thyroid: No thyromegaly.   Cardiovascular: "      Rate and Rhythm: Normal rate and regular rhythm.      Heart sounds: Normal heart sounds.   Pulmonary:      Effort: Pulmonary effort is normal.      Breath sounds: Normal breath sounds.   Abdominal:      General: Bowel sounds are normal. There is no distension.      Palpations: Abdomen is soft.      Tenderness: There is no abdominal tenderness. There is no guarding.   Musculoskeletal:         General: Normal range of motion.      Cervical back: Normal range of motion and neck supple.   Lymphadenopathy:      Cervical: No cervical adenopathy.   Skin:     General: Skin is warm and dry.   Neurological:      General: No focal deficit present.      Mental Status: She is alert and oriented to person, place, and time.      Cranial Nerves: No cranial nerve deficit.   Psychiatric:         Mood and Affect: Mood normal.         Behavior: Behavior normal.         Thought Content: Thought content normal.         Data:  POCT strep: Negative  POCT COVID: Negative  POCT flu:  Negative      Medical history reviewed, Medications reconciled.          JUNIOR RomoP-C  Family Medicine

## 2024-03-20 NOTE — TELEPHONE ENCOUNTER
----- Message from Kalpana Cristina sent at 3/20/2024 12:54 PM CDT -----  Regarding: same day appt request  Contact: pt  Type:  Same Day Appointment Request    Caller is requesting a same day appointment.  Caller declined first available appointment listed below.    Name of Caller:pt  When is the first available appointment?none available  Symptoms:bacterial infection in the stomach  Best Call Back Number:[phone]   [phone]    Additional Information: sts she would like to be seen today

## 2024-03-20 NOTE — TELEPHONE ENCOUNTER
----- Message from Hernry Waterman sent at 3/20/2024  2:16 PM CDT -----  Contact: Self  Type: Needs Medical Advice  Who Called:  PT    Best Call Back Number: [phone]   Additional Information: Has issues swallowing compound med.  Would like to speak to nurse.

## 2024-03-20 NOTE — TELEPHONE ENCOUNTER
Called pt and schedule her an appt for today as requested and pt was fine with date and time of appt.pt sts she really doesn't feel good. Pt hung up.

## 2024-04-03 ENCOUNTER — PATIENT MESSAGE (OUTPATIENT)
Dept: FAMILY MEDICINE | Facility: CLINIC | Age: 84
End: 2024-04-03
Payer: MEDICARE

## 2024-04-03 DIAGNOSIS — R54 SENILE DEBILITY: Primary | ICD-10-CM

## 2024-04-11 ENCOUNTER — OFFICE VISIT (OUTPATIENT)
Dept: SURGERY | Facility: CLINIC | Age: 84
End: 2024-04-11
Payer: MEDICARE

## 2024-04-11 VITALS
HEIGHT: 63 IN | HEART RATE: 87 BPM | WEIGHT: 158.31 LBS | TEMPERATURE: 97 F | BODY MASS INDEX: 28.05 KG/M2 | DIASTOLIC BLOOD PRESSURE: 62 MMHG | SYSTOLIC BLOOD PRESSURE: 127 MMHG

## 2024-04-11 DIAGNOSIS — D09.9 SQUAMOUS CELL CARCINOMA IN SITU: Primary | ICD-10-CM

## 2024-04-11 DIAGNOSIS — K21.9 GASTROESOPHAGEAL REFLUX DISEASE, UNSPECIFIED WHETHER ESOPHAGITIS PRESENT: ICD-10-CM

## 2024-04-11 PROCEDURE — 99213 OFFICE O/P EST LOW 20 MIN: CPT | Mod: PBBFAC,PO | Performed by: SURGERY

## 2024-04-11 PROCEDURE — 99213 OFFICE O/P EST LOW 20 MIN: CPT | Mod: S$PBB,25,, | Performed by: SURGERY

## 2024-04-11 PROCEDURE — 99999 PR PBB SHADOW E&M-EST. PATIENT-LVL III: CPT | Mod: PBBFAC,,, | Performed by: SURGERY

## 2024-04-11 PROCEDURE — 46600 DIAGNOSTIC ANOSCOPY SPX: CPT | Mod: PBBFAC,PO | Performed by: SURGERY

## 2024-04-11 PROCEDURE — 46600 DIAGNOSTIC ANOSCOPY SPX: CPT | Mod: S$PBB,,, | Performed by: SURGERY

## 2024-04-11 RX ORDER — LORATADINE 10 MG
10 TABLET,DISINTEGRATING ORAL DAILY
COMMUNITY

## 2024-04-11 NOTE — PROGRESS NOTES
83 yo F whom I am familiar with.  She is s/p excision of perianal lesion from L posterior region in 12/22.   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  No invasive cancer present  Pt elected not to pursue reexcision, choosing to have close f/u.  Pt doing well.  No complaints or discomfort      AFVSS  AAOx3  Soft/nd/nt  Rectal:  Ext exam with no visible lesions.  ALEX with normal shpincter tone. Anoscopy with int hemorrhoids noted.  No worrisome lesions      A/P: Hx of Sq cell cancer of anal margin    SUSHMA  RTC in 6 months

## 2024-04-12 ENCOUNTER — OFFICE VISIT (OUTPATIENT)
Dept: FAMILY MEDICINE | Facility: CLINIC | Age: 84
End: 2024-04-12
Payer: MEDICARE

## 2024-04-12 VITALS
DIASTOLIC BLOOD PRESSURE: 72 MMHG | WEIGHT: 156.31 LBS | OXYGEN SATURATION: 98 % | SYSTOLIC BLOOD PRESSURE: 128 MMHG | BODY MASS INDEX: 27.7 KG/M2 | HEART RATE: 78 BPM | HEIGHT: 63 IN

## 2024-04-12 DIAGNOSIS — R14.0 BLOATING: ICD-10-CM

## 2024-04-12 DIAGNOSIS — K21.9 GASTROESOPHAGEAL REFLUX DISEASE, UNSPECIFIED WHETHER ESOPHAGITIS PRESENT: Primary | ICD-10-CM

## 2024-04-12 DIAGNOSIS — R41.3 MEMORY IMPAIRMENT: ICD-10-CM

## 2024-04-12 DIAGNOSIS — R35.0 FREQUENCY OF URINATION: ICD-10-CM

## 2024-04-12 LAB
BILIRUBIN, UA POC OHS: NEGATIVE
BLOOD, UA POC OHS: NEGATIVE
CLARITY, UA POC OHS: CLEAR
COLOR, UA POC OHS: YELLOW
GLUCOSE, UA POC OHS: NEGATIVE
KETONES, UA POC OHS: NEGATIVE
LEUKOCYTES, UA POC OHS: NEGATIVE
NITRITE, UA POC OHS: NEGATIVE
PH, UA POC OHS: 6.5
PROTEIN, UA POC OHS: NEGATIVE
SPECIFIC GRAVITY, UA POC OHS: 1.02
UROBILINOGEN, UA POC OHS: 0.2

## 2024-04-12 PROCEDURE — 99999 PR PBB SHADOW E&M-EST. PATIENT-LVL IV: CPT | Mod: PBBFAC,,, | Performed by: NURSE PRACTITIONER

## 2024-04-12 PROCEDURE — 81003 URINALYSIS AUTO W/O SCOPE: CPT | Mod: PBBFAC,PN | Performed by: NURSE PRACTITIONER

## 2024-04-12 PROCEDURE — 99214 OFFICE O/P EST MOD 30 MIN: CPT | Mod: S$PBB,,, | Performed by: NURSE PRACTITIONER

## 2024-04-12 PROCEDURE — 99999PBSHW POCT URINALYSIS(INSTRUMENT): Mod: PBBFAC,,,

## 2024-04-12 PROCEDURE — 99214 OFFICE O/P EST MOD 30 MIN: CPT | Mod: PBBFAC,PN | Performed by: NURSE PRACTITIONER

## 2024-04-12 NOTE — PROGRESS NOTES
THIS DOCUMENT WAS MADE IN PART WITH VOICE RECOGNITION SOFTWARE.  OCCASIONALLY THIS SOFTWARE WILL MISINTERPRET WORDS OR PHRASES.     Assessment and Plan:      Gastroesophageal reflux disease, unspecified whether esophagitis present  Comments:  - Recommend minimize/avoid high-fat foods, chocolate, caffeine, citrus, alcohol, & tomato products.  Continue Protonix daily  Keep follow up with GI    Bloating    Frequency of urination  -     POCT Urinalysis(Instrument)    Memory impairment    Abdominal exam unremarkable.  Urinalysis within normal limits.   Advised on diet and medication.   Patient to continue Protonix daily.  Follow up with GI as scheduled.  Emergent conditions/ER precautions discussed    Follow up in about 3 months (around 7/12/2024) for Follow-up with PCP.   ______________________________________________________________________  Subjective:    Chief Complaint:  Lower pelvic pressure    HPI:  Christa is a 84 y.o. year old female with a past medical history noted below presents c/o bloating and urinary frequency.  She reports occasional urinary incontinence- wears pad. Patient reports that this has been going on for quite a while- she is a poor historian - but feels like symptoms has been more frequent lately.  Denies abdominal pain, fever and dysuria.  Seen Dr Epps- had Upper endoscopy on 3/11/24 with biopsy dx with H.pylori -prescribed - She reports completing treatment- reports improvement in epigastric burning- taking Protonix daily    She has f/u with Dr. Epps on 4/23/24    HPV related skin cancer, perianal  Resected December 28, 2022  Pathology-squamous cell carcinoma in situ  Surgeon-Dr. Hinkle -- follow-up every 3 months- had Anoscopy yesterday- internal hemorrhoids noted.     Overactive bladder   Nocturia 2x nightly   Prev med : Myrbetriq      Allergic rhinitis   Rx - Flonase + Zyrtec  Has air purifier at home      Nicotine dependence   In remission-quit in 2004     Vascular insufficiency       Heartburn:  Protonix 40 mg daily     Osteopenia, vitamin-D deficiency  + Fall risk   Taking 5000 units daily vitamin-D, 500 mg calcium daily  No recent fractures     Situational anxiety  Med-Xanax 0.25 mg daily p.r.n.     Atherosclerosis of aorta  Previous LDL 90     FH Polycystic Kidney Disease   Father, GF, Sister        Medications:  Current Outpatient Medications on File Prior to Visit   Medication Sig Dispense Refill    cholecalciferol, vitamin D3, (VITAMIN D3) 125 mcg (5,000 unit) Tab Take 1 tablet (5,000 Units total) by mouth once daily. 90 tablet 3    pantoprazole (PROTONIX) 40 MG tablet Take 1 tablet (40 mg total) by mouth once daily. 30 tablet 2    polyethylene glycol (GLYCOLAX) 17 gram PwPk Take 17 g by mouth once daily at 6am.      ALPRAZolam (XANAX) 0.25 MG tablet TAKE 1 TABLET(0.25 MG) BY MOUTH DAILY AS NEEDED FOR ANXIETY (Patient not taking: Reported on 9/14/2023) 30 tablet 3    calcium carbonate (TUMS) 200 mg calcium (500 mg) chewable tablet Take 1 tablet by mouth once daily. (Patient not taking: Reported on 4/11/2024)      cetirizine (ZYRTEC) 10 MG tablet Take 10 mg by mouth once daily. (Patient not taking: Reported on 4/11/2024)      fluticasone propionate (FLONASE) 50 mcg/actuation nasal spray SHAKE LIQUID AND USE 2 SPRAYS(100 MCG) IN EACH NOSTRIL ONCE DAILY (Patient not taking: Reported on 4/11/2024) 48 g 2    loratadine (CLARITIN REDITABS) 10 mg dissolvable tablet Take 10 mg by mouth once daily. (Patient not taking: Reported on 4/12/2024)      nystatin (MYCOSTATIN) powder Apply topically 2 (two) times daily. (Patient not taking: Reported on 10/25/2023) 60 g 2    [DISCONTINUED] bismuth-metronidazole-tetracycline (PLYERA) 140-125-125 mg per capsule Take 3 capsules by mouth 4 (four) times daily. (Patient not taking: Reported on 4/11/2024)      [DISCONTINUED] metroNIDAZOLE (FLAGYL) 250 MG tablet Take 500 mg by mouth 2 (two) times daily. (Patient not taking: Reported on 4/11/2024)       Current  "Facility-Administered Medications on File Prior to Visit   Medication Dose Route Frequency Provider Last Rate Last Admin    electrolyte-S (ISOLYTE)   Intravenous Continuous Torsten Lao MD        fentaNYL 50 mcg/mL injection 25 mcg  25 mcg Intravenous Q5 Min PRN Torsten Lao MD        HYDROmorphone (PF) injection 0.2 mg  0.2 mg Intravenous Q5 Min PRN Torsten Lao MD        lactated ringers infusion   Intravenous Continuous Torsten Lao MD 10 mL/hr at 12/28/22 0930 Restarted at 12/28/22 1040    LIDOcaine (PF) 10 mg/ml (1%) injection 10 mg  1 mL Intradermal Once Torsten Lao MD        oxyCODONE immediate release tablet 5 mg  5 mg Oral Q3H PRN Torsten Lao MD           Review of Systems:  Review of Systems   Constitutional:  Negative for fatigue and fever.   Respiratory:  Negative for cough, chest tightness and shortness of breath.    Cardiovascular:  Negative for chest pain, palpitations and leg swelling.   Gastrointestinal:  Negative for abdominal distention, abdominal pain, anal bleeding, constipation, diarrhea, nausea, rectal pain and vomiting.   Genitourinary:  Positive for frequency. Negative for decreased urine volume, difficulty urinating, flank pain, pelvic pain and urgency.   Musculoskeletal:  Negative for back pain.       Past Medical History:  Past Medical History:   Diagnosis Date    Cataract     OD       Objective:    Vitals:  Vitals:    04/12/24 1412   BP: 128/72   Pulse: 78   SpO2: 98%   Weight: 70.9 kg (156 lb 4.9 oz)   Height: 5' 3" (1.6 m)   PainSc: 0-No pain       Physical Exam  Constitutional:       General: She is not in acute distress.     Appearance: Normal appearance.   HENT:      Head: Normocephalic and atraumatic.      Nose: Nose normal.      Mouth/Throat:      Mouth: Mucous membranes are moist.      Pharynx: Oropharynx is clear. No oropharyngeal exudate or posterior oropharyngeal erythema.   Eyes:      Pupils: Pupils are equal, round, and reactive to light. "   Cardiovascular:      Rate and Rhythm: Normal rate and regular rhythm.      Heart sounds: Normal heart sounds. No murmur heard.  Pulmonary:      Effort: Pulmonary effort is normal. No respiratory distress.      Breath sounds: Normal breath sounds. No wheezing.   Abdominal:      General: Bowel sounds are normal. There is no distension.      Palpations: Abdomen is soft.      Tenderness: There is no abdominal tenderness. There is no right CVA tenderness, left CVA tenderness or guarding.   Musculoskeletal:      Cervical back: Normal range of motion.   Skin:     General: Skin is warm.   Neurological:      Mental Status: She is alert and oriented to person, place, and time.   Psychiatric:         Behavior: Behavior normal.         Data:  CMP  Sodium   Date Value Ref Range Status   03/04/2024 138 136 - 145 mmol/L Final     Potassium   Date Value Ref Range Status   03/04/2024 3.8 3.5 - 5.1 mmol/L Final     Comment:     Anion Gap reference range revised on 4/28/2023     Chloride   Date Value Ref Range Status   03/04/2024 103 95 - 110 mmol/L Final     CO2   Date Value Ref Range Status   03/04/2024 28 22 - 31 mmol/L Final     Glucose   Date Value Ref Range Status   03/04/2024 108 70 - 110 mg/dL Final     Comment:     The ADA recommends the following guidelines for fasting glucose:    Normal:       less than 100 mg/dL    Prediabetes:  100 mg/dL to 125 mg/dL    Diabetes:     126 mg/dL or higher       BUN   Date Value Ref Range Status   03/04/2024 11 7 - 18 mg/dL Final     Creatinine   Date Value Ref Range Status   03/04/2024 0.53 0.50 - 1.40 mg/dL Final     Calcium   Date Value Ref Range Status   03/04/2024 9.9 8.4 - 10.2 mg/dL Final     Total Protein   Date Value Ref Range Status   03/04/2024 8.0 6.0 - 8.4 g/dL Final     Albumin   Date Value Ref Range Status   03/04/2024 4.8 3.5 - 5.2 g/dL Final     Total Bilirubin   Date Value Ref Range Status   03/04/2024 0.7 0.2 - 1.3 mg/dL Final     Alkaline Phosphatase   Date Value Ref  Range Status   03/04/2024 81 38 - 145 U/L Final     AST   Date Value Ref Range Status   03/04/2024 34 14 - 36 U/L Final     ALT   Date Value Ref Range Status   03/04/2024 15 0 - 35 U/L Final     Anion Gap   Date Value Ref Range Status   03/04/2024 7 5 - 12 mmol/L Final     Comment:     Anion Gap reference range revised on 4/28/2023     eGFR   Date Value Ref Range Status   03/04/2024 >60 >60 mL/min/1.73 m^2 Final      Lab Results   Component Value Date    WBC 6.96 03/04/2024    HGB 13.6 03/04/2024    HCT 40.9 03/04/2024    MCV 86 03/04/2024     03/04/2024           Medical history reviewed, Medications reconciled.          I spent a total of 30 minutes on the day of the visit.  This includes face to face time and non-face to face time preparing to see the patient (eg, review of tests), obtaining and/or reviewing separately obtained history, documenting clinical information in the electronic or other health record, independently interpreting results and communicating results to the patient/family/caregiver, or care coordinator.     Zena Carlin, FNP-C  Family Medicine

## 2024-04-15 RX ORDER — PANTOPRAZOLE SODIUM 40 MG/1
40 TABLET, DELAYED RELEASE ORAL
Qty: 90 TABLET | Refills: 3 | Status: SHIPPED | OUTPATIENT
Start: 2024-04-15 | End: 2024-06-06

## 2024-05-06 ENCOUNTER — TELEPHONE (OUTPATIENT)
Dept: OPHTHALMOLOGY | Facility: CLINIC | Age: 84
End: 2024-05-06
Payer: MEDICARE

## 2024-05-06 NOTE — TELEPHONE ENCOUNTER
Called to reschedule pt. Left pt message asking to call back.     ----- Message from Jordan Andrews sent at 5/6/2024 11:12 AM CDT -----  Regarding: appointment access  Contact: [phone]  Pt has an appointment date 05/28/2024 pt can nolonger come in for this appointment. Pt will like to re schedule her appointment sometime in June. Please contact pt with appointment.

## 2024-05-07 ENCOUNTER — TELEPHONE (OUTPATIENT)
Dept: OPHTHALMOLOGY | Facility: CLINIC | Age: 84
End: 2024-05-07
Payer: MEDICARE

## 2024-05-07 NOTE — TELEPHONE ENCOUNTER
Pt will keep current appointment. Pt was just calling to confirm appt.    ----- Message from Phoebe Rivera sent at 5/6/2024 11:41 AM CDT -----  Regarding: Patient Returning Missed Call  Patient is returning missed call to r/s 5/28 appt to next available [place] [facility].     Pts call back- [phone]

## 2024-05-16 ENCOUNTER — OFFICE VISIT (OUTPATIENT)
Dept: FAMILY MEDICINE | Facility: CLINIC | Age: 84
End: 2024-05-16
Payer: MEDICARE

## 2024-05-16 ENCOUNTER — HOSPITAL ENCOUNTER (OUTPATIENT)
Dept: RADIOLOGY | Facility: HOSPITAL | Age: 84
Discharge: HOME | End: 2024-05-16
Attending: INTERNAL MEDICINE
Payer: MEDICARE

## 2024-05-16 VITALS
HEART RATE: 82 BPM | BODY MASS INDEX: 26.39 KG/M2 | RESPIRATION RATE: 16 BRPM | DIASTOLIC BLOOD PRESSURE: 74 MMHG | HEIGHT: 63 IN | TEMPERATURE: 101 F | WEIGHT: 148.94 LBS | SYSTOLIC BLOOD PRESSURE: 130 MMHG

## 2024-05-16 DIAGNOSIS — J18.9 COMMUNITY ACQUIRED PNEUMONIA, UNSPECIFIED LATERALITY: ICD-10-CM

## 2024-05-16 DIAGNOSIS — J18.9 COMMUNITY ACQUIRED PNEUMONIA, UNSPECIFIED LATERALITY: Primary | ICD-10-CM

## 2024-05-16 PROCEDURE — 71046 X-RAY EXAM CHEST 2 VIEWS: CPT | Mod: TC,PN

## 2024-05-16 PROCEDURE — 99214 OFFICE O/P EST MOD 30 MIN: CPT | Mod: S$PBB,,, | Performed by: INTERNAL MEDICINE

## 2024-05-16 PROCEDURE — 71046 X-RAY EXAM CHEST 2 VIEWS: CPT | Mod: 26,,, | Performed by: RADIOLOGY

## 2024-05-16 PROCEDURE — 99213 OFFICE O/P EST LOW 20 MIN: CPT | Mod: PBBFAC,PN | Performed by: INTERNAL MEDICINE

## 2024-05-16 PROCEDURE — 99999 PR PBB SHADOW E&M-EST. PATIENT-LVL III: CPT | Mod: PBBFAC,,, | Performed by: INTERNAL MEDICINE

## 2024-05-16 RX ORDER — DOXYCYCLINE HYCLATE 100 MG
100 TABLET ORAL 2 TIMES DAILY
Qty: 14 TABLET | Refills: 0 | Status: SHIPPED | OUTPATIENT
Start: 2024-05-16 | End: 2024-05-23

## 2024-05-16 RX ORDER — ALBUTEROL SULFATE 90 UG/1
2 AEROSOL, METERED RESPIRATORY (INHALATION) EVERY 6 HOURS PRN
Qty: 18 G | Refills: 0 | Status: SHIPPED | OUTPATIENT
Start: 2024-05-16 | End: 2025-05-16

## 2024-05-16 NOTE — PROGRESS NOTES
Assessment and Plan:    .1. Community acquired pneumonia, unspecified laterality  Symptoms and exam concerning for pneumonia.  Patient is allergic to penicillins and Levaquin, we will treat with doxycycline.  We will obtain chest x-ray.  Negative for COVID and flu.  Discussed precautions with patient and her daughter.  Advised to contact us if not improving over the next couple of days  - doxycycline (VIBRA-TABS) 100 MG tablet; Take 1 tablet (100 mg total) by mouth 2 (two) times daily. for 7 days  Dispense: 14 tablet; Refill: 0  - POCT COVID-19 Rapid Screening  - POCT Influenza A/B Molecular  - albuterol (PROVENTIL/VENTOLIN HFA) 90 mcg/actuation inhaler; Inhale 2 puffs into the lungs every 6 (six) hours as needed for Wheezing. Rescue  Dispense: 18 g; Refill: 0  - X-Ray Chest PA And Lateral; Future      ______________________________________________________________________  Subjective:    Chief Complaint:  Concern for pneumonia    HPI:  Christa is a 84 y.o. year old female here to discuss concern for pneumonia.     She is new to me she is a patient of Jonah Stone MD.      She has been having fever, bad cough, productive of thick mucus. Trouble breathing, wheezing. Symptoms started 6 days ago. No appetite. Daughter had been having similar symptoms, but not as severe. No history of any lung disease in the past.    Medications:  Current Outpatient Medications on File Prior to Visit   Medication Sig Dispense Refill    calcium carbonate (TUMS) 200 mg calcium (500 mg) chewable tablet Take 1 tablet by mouth once daily.      cetirizine (ZYRTEC) 10 MG tablet Take 10 mg by mouth once daily.      cholecalciferol, vitamin D3, (VITAMIN D3) 125 mcg (5,000 unit) Tab Take 1 tablet (5,000 Units total) by mouth once daily. 90 tablet 3    fluticasone propionate (FLONASE) 50 mcg/actuation nasal spray SHAKE LIQUID AND USE 2 SPRAYS(100 MCG) IN EACH NOSTRIL ONCE DAILY 48 g 2    loratadine (CLARITIN REDITABS) 10 mg dissolvable tablet  "Take 10 mg by mouth once daily.      ALPRAZolam (XANAX) 0.25 MG tablet TAKE 1 TABLET(0.25 MG) BY MOUTH DAILY AS NEEDED FOR ANXIETY (Patient not taking: Reported on 9/14/2023) 30 tablet 3    nystatin (MYCOSTATIN) powder Apply topically 2 (two) times daily. (Patient not taking: Reported on 10/25/2023) 60 g 2    pantoprazole (PROTONIX) 40 MG tablet TAKE 1 TABLET(40 MG) BY MOUTH EVERY DAY (Patient not taking: Reported on 5/16/2024) 90 tablet 3    polyethylene glycol (GLYCOLAX) 17 gram PwPk Take 17 g by mouth once daily at 6am. (Patient not taking: Reported on 5/16/2024)       Current Facility-Administered Medications on File Prior to Visit   Medication Dose Route Frequency Provider Last Rate Last Admin    electrolyte-S (ISOLYTE)   Intravenous Continuous Torsten Lao MD        fentaNYL 50 mcg/mL injection 25 mcg  25 mcg Intravenous Q5 Min PRN Torsten Lao MD        HYDROmorphone (PF) injection 0.2 mg  0.2 mg Intravenous Q5 Min PRN Torsten Lao MD        lactated ringers infusion   Intravenous Continuous Torsten Lao MD 10 mL/hr at 12/28/22 0930 Restarted at 12/28/22 1040    LIDOcaine (PF) 10 mg/ml (1%) injection 10 mg  1 mL Intradermal Once Torsten Lao MD        oxyCODONE immediate release tablet 5 mg  5 mg Oral Q3H PRN Torsten Lao MD           Review of Systems:  Review of Systems   Constitutional:  Positive for appetite change, chills, fatigue and fever.   Respiratory:  Positive for cough, shortness of breath and wheezing.    Gastrointestinal:  Negative for nausea and vomiting.       Past Medical History:  Past Medical History:   Diagnosis Date    Cataract     OD       Objective:    Vitals:  Vitals:    05/16/24 1415   BP: 130/74   Pulse: 82   Resp: 16   Temp: (!) 100.7 °F (38.2 °C)   TempSrc: Oral   Weight: 67.5 kg (148 lb 14.7 oz)   Height: 5' 3" (1.6 m)       Physical Exam  Vitals reviewed.   Constitutional:       General: She is not in acute distress.     Appearance: She is well-developed. "   Eyes:      General:         Right eye: No discharge.         Left eye: No discharge.      Conjunctiva/sclera: Conjunctivae normal.   Cardiovascular:      Rate and Rhythm: Normal rate and regular rhythm.   Pulmonary:      Effort: Pulmonary effort is normal. No respiratory distress.      Breath sounds: Wheezing (expiratory wheezes heard in all lung fields) and rhonchi (faint crackles in bilateral bases) present.   Skin:     General: Skin is warm and dry.   Neurological:      Mental Status: She is alert and oriented to person, place, and time.   Psychiatric:         Behavior: Behavior normal.         Thought Content: Thought content normal.         Judgment: Judgment normal.         Data:  Cr normal on last labs      Monica Castano MD  Internal Medicine

## 2024-05-20 ENCOUNTER — PATIENT MESSAGE (OUTPATIENT)
Dept: FAMILY MEDICINE | Facility: CLINIC | Age: 84
End: 2024-05-20
Payer: MEDICARE

## 2024-05-20 DIAGNOSIS — B37.31 VAGINA, CANDIDIASIS: Primary | ICD-10-CM

## 2024-05-21 RX ORDER — FLUCONAZOLE 150 MG/1
150 TABLET ORAL EVERY OTHER DAY
Qty: 2 TABLET | Refills: 0 | Status: SHIPPED | OUTPATIENT
Start: 2024-05-21 | End: 2024-05-24

## 2024-05-28 ENCOUNTER — PATIENT MESSAGE (OUTPATIENT)
Dept: OPHTHALMOLOGY | Facility: CLINIC | Age: 84
End: 2024-05-28

## 2024-05-28 ENCOUNTER — OFFICE VISIT (OUTPATIENT)
Dept: OPHTHALMOLOGY | Facility: CLINIC | Age: 84
End: 2024-05-28
Payer: MEDICARE

## 2024-05-28 DIAGNOSIS — H25.11 NUCLEAR SCLEROTIC CATARACT OF RIGHT EYE: Primary | ICD-10-CM

## 2024-05-28 DIAGNOSIS — Z96.1 STATUS POST CATARACT EXTRACTION AND INSERTION OF INTRAOCULAR LENS, LEFT: ICD-10-CM

## 2024-05-28 DIAGNOSIS — H54.7 DECREASED VISUAL ACUITY: ICD-10-CM

## 2024-05-28 DIAGNOSIS — H40.009 GLAUCOMA SUSPECT, UNSPECIFIED LATERALITY: ICD-10-CM

## 2024-05-28 DIAGNOSIS — Z98.42 STATUS POST CATARACT EXTRACTION AND INSERTION OF INTRAOCULAR LENS, LEFT: ICD-10-CM

## 2024-05-28 DIAGNOSIS — H40.89 OTHER GLAUCOMA OF BOTH EYES: ICD-10-CM

## 2024-05-28 DIAGNOSIS — H40.023 OPEN ANGLE WITH BORDERLINE FINDINGS AND HIGH GLAUCOMA RISK IN BOTH EYES: ICD-10-CM

## 2024-05-28 PROCEDURE — 92133 CPTRZD OPH DX IMG PST SGM ON: CPT | Mod: PBBFAC,PO | Performed by: OPHTHALMOLOGY

## 2024-05-28 PROCEDURE — 99999 PR PBB SHADOW E&M-EST. PATIENT-LVL III: CPT | Mod: PBBFAC,,, | Performed by: OPHTHALMOLOGY

## 2024-05-28 PROCEDURE — 99213 OFFICE O/P EST LOW 20 MIN: CPT | Mod: PBBFAC,PO,25 | Performed by: OPHTHALMOLOGY

## 2024-05-28 PROCEDURE — 92136 OPHTHALMIC BIOMETRY: CPT | Mod: PBBFAC,PO,RT | Performed by: OPHTHALMOLOGY

## 2024-05-28 PROCEDURE — 99214 OFFICE O/P EST MOD 30 MIN: CPT | Mod: S$PBB,,, | Performed by: OPHTHALMOLOGY

## 2024-05-28 RX ORDER — OFLOXACIN 3 MG/ML
1 SOLUTION/ DROPS OPHTHALMIC 3 TIMES DAILY
Qty: 5 ML | Refills: 3 | Status: SHIPPED | OUTPATIENT
Start: 2024-05-28 | End: 2024-06-06

## 2024-05-28 RX ORDER — PREDNISOLONE ACETATE 10 MG/ML
1 SUSPENSION/ DROPS OPHTHALMIC 3 TIMES DAILY
Qty: 5 ML | Refills: 3 | Status: SHIPPED | OUTPATIENT
Start: 2024-05-28 | End: 2024-06-06

## 2024-05-28 RX ORDER — KETOROLAC TROMETHAMINE 5 MG/ML
1 SOLUTION OPHTHALMIC 3 TIMES DAILY
Qty: 5 ML | Refills: 3 | Status: SHIPPED | OUTPATIENT
Start: 2024-05-28 | End: 2024-06-06

## 2024-05-28 RX ORDER — PREDNISOLONE/MOXIFLOX/BROMFEN 1 %-0.5 %
1 SUSPENSION, DROPS(FINAL DOSAGE FORM)(ML) OPHTHALMIC (EYE) 3 TIMES DAILY
Qty: 5 ML | Refills: 3 | Status: SHIPPED | OUTPATIENT
Start: 2024-05-28 | End: 2024-06-06

## 2024-05-28 NOTE — PROGRESS NOTES
HPI    Referred by Daughter Dayana Tate     Age-related nuclear cataract of right eye  Open angle with borderline findings and high glaucoma risk in both eyes  Pciol OS - german [id number] 23.5- 2017    Gtt's: None     Patient is here today for cataract evaluation of the right eye.  Pt. States vision in OD is blurry. Pt only wear readers.   Pt. Have trouble with glare throughout the day.  Pt. Denies new floaters, flashes, photophobia, pain or discomfort.  Last edited by Lynette Kirby MD on 5/28/2024 10:37 AM.            Assessment /Plan     For exam results, see Encounter Report.    Nuclear sclerotic cataract of right eye  -     IOL Master - OD - Right Eye  -     Case Request Operating Room: EXTRACTION, CATARACT, WITH IOL INSERTION    Decreased visual acuity  -     Ambulatory referral/consult to Ophthalmology    Status post cataract extraction and insertion of intraocular lens, left    Open angle with borderline findings and high glaucoma risk in both eyes    Glaucoma suspect, unspecified laterality  -     Posterior Segment OCT Optic Nerve- Both eyes    Other glaucoma of both eyes  -     Posterior Segment OCT Optic Nerve- Both eyes      Visually significant nuclear sclerotic cataract in both eyes    - Cataracts are interfering with activities of daily living, including night time driving.  - Pt desires cataract surgery for visual rehabilitation.   - risks / benefits/ alternatives were discussed and patient agrees to proceed with surgery.   - IOL options discussed as well, according to patient's goals and concomitant ocular pathology.  - Target: plano.      [id number] 23.5 OD range  Ora    Patient has regular astigmatism that is visually significant and wishes to have an astigmatism correcting TORIC lens placed, and agrees to the out of pocket cost of $2000 right eye         PCIOL OS    Incr c/d OU // GS  - healthy rims on OCT nerves today, observe

## 2024-06-06 ENCOUNTER — OFFICE VISIT (OUTPATIENT)
Dept: FAMILY MEDICINE | Facility: CLINIC | Age: 84
End: 2024-06-06
Payer: MEDICARE

## 2024-06-06 ENCOUNTER — HOSPITAL ENCOUNTER (OUTPATIENT)
Dept: RADIOLOGY | Facility: HOSPITAL | Age: 84
Discharge: HOME | End: 2024-06-06
Attending: FAMILY MEDICINE
Payer: MEDICARE

## 2024-06-06 VITALS
BODY MASS INDEX: 26.34 KG/M2 | WEIGHT: 148.69 LBS | DIASTOLIC BLOOD PRESSURE: 86 MMHG | OXYGEN SATURATION: 96 % | RESPIRATION RATE: 18 BRPM | HEIGHT: 63 IN | HEART RATE: 93 BPM | SYSTOLIC BLOOD PRESSURE: 136 MMHG

## 2024-06-06 DIAGNOSIS — J18.9 COMMUNITY ACQUIRED PNEUMONIA, UNSPECIFIED LATERALITY: ICD-10-CM

## 2024-06-06 DIAGNOSIS — Z79.899 DRUG THERAPY: ICD-10-CM

## 2024-06-06 DIAGNOSIS — R05.3 CHRONIC COUGH: ICD-10-CM

## 2024-06-06 DIAGNOSIS — Z23 IMMUNIZATION DUE: Primary | ICD-10-CM

## 2024-06-06 DIAGNOSIS — F39 UNSPECIFIED MOOD (AFFECTIVE) DISORDER: ICD-10-CM

## 2024-06-06 DIAGNOSIS — I70.0 ATHEROSCLEROSIS OF AORTA: ICD-10-CM

## 2024-06-06 DIAGNOSIS — R13.10 DYSPHAGIA, UNSPECIFIED TYPE: ICD-10-CM

## 2024-06-06 PROCEDURE — 99999 PR PBB SHADOW E&M-EST. PATIENT-LVL III: CPT | Mod: PBBFAC,,, | Performed by: FAMILY MEDICINE

## 2024-06-06 PROCEDURE — 99213 OFFICE O/P EST LOW 20 MIN: CPT | Mod: PBBFAC,PN,25 | Performed by: FAMILY MEDICINE

## 2024-06-06 PROCEDURE — 99214 OFFICE O/P EST MOD 30 MIN: CPT | Mod: S$PBB,,, | Performed by: FAMILY MEDICINE

## 2024-06-06 PROCEDURE — 71046 X-RAY EXAM CHEST 2 VIEWS: CPT | Mod: TC,PN

## 2024-06-06 PROCEDURE — 71046 X-RAY EXAM CHEST 2 VIEWS: CPT | Mod: 26,,, | Performed by: RADIOLOGY

## 2024-06-06 RX ORDER — PREDNISONE 20 MG/1
TABLET ORAL
Qty: 10 TABLET | Refills: 0 | Status: SHIPPED | OUTPATIENT
Start: 2024-06-06

## 2024-06-06 RX ORDER — FLUTICASONE PROPIONATE 50 MCG
2 SPRAY, SUSPENSION (ML) NASAL DAILY
Qty: 48 G | Refills: 2 | Status: SHIPPED | OUTPATIENT
Start: 2024-06-06

## 2024-06-06 RX ORDER — AZITHROMYCIN 250 MG/1
TABLET, FILM COATED ORAL
Qty: 6 TABLET | Refills: 0 | Status: SHIPPED | OUTPATIENT
Start: 2024-06-06 | End: 2024-06-11

## 2024-06-06 NOTE — PROGRESS NOTES
THIS DOCUMENT WAS MADE IN PART WITH VOICE RECOGNITION SOFTWARE.  OCCASIONALLY THIS SOFTWARE WILL MISINTERPRET WORDS OR PHRASES.    Assessment and Plan:    1. Immunization due        2. Drug therapy  CBC Auto Differential    Comprehensive Metabolic Panel    Lipid Panel    Hemoglobin A1C    TSH    T4, Free    Urinalysis    Urinalysis Microscopic      3. Unspecified mood (affective) disorder        4. Atherosclerosis of aorta        5. Community acquired pneumonia, unspecified laterality  X-Ray Chest PA And Lateral    predniSONE (DELTASONE) 20 MG tablet    azithromycin (Z-JOSHUA) 250 MG tablet      6. Chronic cough  fluticasone propionate (FLONASE) 50 mcg/actuation nasal spray      7. Dysphagia, unspecified type  fluticasone propionate (FLONASE) 50 mcg/actuation nasal spray          Wellness labs before next visit     Chest x-ray today   Prednisone, azithromycin with continued symptoms of lower respiratory tract infection     Refilled Flonase per patient request     DEXA bone scan before next appointment     Pneumonia vaccine later this year    ______________________________________________________________________  Subjective:    Chief Complaint:  Chief Complaint   Patient presents with    Follow-up        HPI:  Christa is a 84 y.o. year old     Follow-up pneumonia   Diagnosed by partner physician 05/16/2024   Still with cough, chest congestion  Cough prod of sputum  Still with intermit SOB / Wheeze   No fever     Follow-up perianal squamous cell carcinoma      HPV related skin cancer, perianal  Resected December 28, 2022  Pathology-squamous cell carcinoma in situ  Surgeon-Dr. Hinkle -- follow-up every 3 months     Overactive bladder   Nocturia 2x nightly   Prev med : Myrbetriq      Allergic rhinitis   Rx - Flonase + Claritin  Has air purifier at home      Nicotine dependence   In remission-quit in 2004     Vascular insufficiency      Heartburn   Tums as needed      Osteopenia, vitamin-D deficiency  + Fall risk   Taking  5000 units daily vitamin-D, 500 mg calcium daily  No recent fractures     Situational anxiety/mood disorder  Med-Xanax 0.25 mg daily p.r.n.     Atherosclerosis of aorta  Previous LDL 90     FH Polycystic Kidney Disease   Father, GF, Sister    Past Medical History:  Past Medical History:   Diagnosis Date    Cataract     OD       Past Surgical History:  Past Surgical History:   Procedure Laterality Date    CATARACT EXTRACTION W/  INTRAOCULAR LENS IMPLANT Left 2017    Dr. Nelson    EXAMINATION UNDER ANESTHESIA N/A 2022    Procedure: Exam under anesthesia;  Surgeon: uDtch Hinkle MD;  Location: [facility] OR;  Service: General;  Laterality: N/A;    RECTAL BIOPSY N/A 2022    Procedure: BIOPSY, RECTUM;  Surgeon: Dutch Hinkle MD;  Location: [facility] OR;  Service: General;  Laterality: N/A;       Family History:  Family History   Problem Relation Name Age of Onset    Glaucoma Neg Hx      Macular degeneration Neg Hx         Social History:  Social History     Socioeconomic History    Marital status:    Tobacco Use    Smoking status: Former     Current packs/day: 0.00     Types: Cigarettes     Quit date: 2004     Years since quittin.4    Smokeless tobacco: Never   Substance and Sexual Activity    Alcohol use: Not Currently    Drug use: Never     Social Determinants of Health     Financial Resource Strain: Low Risk  (2022)    Overall Financial Resource Strain (CARDIA)     Difficulty of Paying Living Expenses: Not hard at all   Food Insecurity: No Food Insecurity (2022)    Hunger Vital Sign     Worried About Running Out of Food in the Last Year: Never true     Ran Out of Food in the Last Year: Never true   Transportation Needs: No Transportation Needs (2022)    PRAPARE - Transportation     Lack of Transportation (Medical): No     Lack of Transportation (Non-Medical): No   Physical Activity: Insufficiently Active (2022)    Exercise Vital Sign     Days of Exercise per Week:  3 days     Minutes of Exercise per Session: 40 min   Stress: No Stress Concern Present (12/7/2022)    [employer] - Occupational Stress Questionnaire     Feeling of Stress : Only a little   Housing Stability: Unknown (12/7/2022)    Housing Stability Vital Sign     Unable to Pay for Housing in the Last Year: No     Unstable Housing in the Last Year: No       Medications:  Current Outpatient Medications on File Prior to Visit   Medication Sig Dispense Refill    albuterol (PROVENTIL/VENTOLIN HFA) 90 mcg/actuation inhaler Inhale 2 puffs into the lungs every 6 (six) hours as needed for Wheezing. Rescue 18 g 0    cetirizine (ZYRTEC) 10 MG tablet Take 10 mg by mouth once daily.      cholecalciferol, vitamin D3, (VITAMIN D3) 125 mcg (5,000 unit) Tab Take 1 tablet (5,000 Units total) by mouth once daily. 90 tablet 3    loratadine (CLARITIN REDITABS) 10 mg dissolvable tablet Take 10 mg by mouth once daily.      [DISCONTINUED] fluticasone propionate (FLONASE) 50 mcg/actuation nasal spray SHAKE LIQUID AND USE 2 SPRAYS(100 MCG) IN EACH NOSTRIL ONCE DAILY 48 g 2    ALPRAZolam (XANAX) 0.25 MG tablet TAKE 1 TABLET(0.25 MG) BY MOUTH DAILY AS NEEDED FOR ANXIETY (Patient not taking: Reported on 6/6/2024) 30 tablet 3    calcium carbonate (TUMS) 200 mg calcium (500 mg) chewable tablet Take 1 tablet by mouth once daily. (Patient not taking: Reported on 6/6/2024)      ketorolac 0.5% (ACULAR) 0.5 % Drop Place 1 drop into the right eye 3 (three) times daily. (Patient not taking: Reported on 6/6/2024) 5 mL 3    nystatin (MYCOSTATIN) powder Apply topically 2 (two) times daily. (Patient not taking: Reported on 10/25/2023) 60 g 2    ofloxacin (OCUFLOX) 0.3 % ophthalmic solution Place 1 drop into the right eye 3 (three) times daily. (Patient not taking: Reported on 6/6/2024) 5 mL 3    pantoprazole (PROTONIX) 40 MG tablet TAKE 1 TABLET(40 MG) BY MOUTH EVERY DAY (Patient not taking: Reported on 5/16/2024) 90 tablet 3  "   polyethylene glycol (GLYCOLAX) 17 gram PwPk Take 17 g by mouth once daily at 6am. (Patient not taking: Reported on 5/16/2024)      prednisoLONE acetate (PRED FORTE) 1 % DrpS Place 1 drop into the right eye 3 (three) times daily. (Patient not taking: Reported on 6/6/2024) 5 mL 3    prednisoLONE-moxiflox-bromfen 1-0.5-0.075 % DrpS Apply 1 drop to eye 3 (three) times daily. (Patient not taking: Reported on 6/6/2024) 5 mL 3     Current Facility-Administered Medications on File Prior to Visit   Medication Dose Route Frequency Provider Last Rate Last Admin    electrolyte-S (ISOLYTE)   Intravenous Continuous Torsten Lao MD        fentaNYL 50 mcg/mL injection 25 mcg  25 mcg Intravenous Q5 Min PRN Torsten Lao MD        HYDROmorphone (PF) injection 0.2 mg  0.2 mg Intravenous Q5 Min PRN Torsten Lao MD        lactated ringers infusion   Intravenous Continuous Torsten Lao MD 10 mL/hr at 12/28/22 0930 Restarted at 12/28/22 1040    LIDOcaine (PF) 10 mg/ml (1%) injection 10 mg  1 mL Intradermal Once Torsten Lao MD        oxyCODONE immediate release tablet 5 mg  5 mg Oral Q3H PRN Torsten Lao MD           Allergies:  Metal staples [surgical stainless steel], Penicillins, and Levofloxacin    Immunizations:  Immunization History   Administered Date(s) Administered    COVID-19, MRNA, LN-S, PF (MODERNA FULL 0.5 ML DOSE) 02/18/2021, 03/18/2021    Tdap 05/15/2019       Review of Systems:  Review of Systems   All other systems reviewed and are negative.      Objective:    Vitals:  Vitals:    06/06/24 1102   BP: 136/86   Pulse: 93   Resp: 18   SpO2: 96%   Weight: 67.4 kg (148 lb 11.2 oz)   Height: 5' 3" (1.6 m)   PainSc:   3   PainLoc: Head       Physical Exam  Vitals reviewed.   Constitutional:       General: She is not in acute distress.  HENT:      Head: Normocephalic and atraumatic.   Eyes:      Pupils: Pupils are equal, round, and reactive to light.   Cardiovascular:      Rate and Rhythm: Normal rate and " regular rhythm.      Heart sounds: No murmur heard.     No friction rub.   Pulmonary:      Effort: Pulmonary effort is normal.      Breath sounds: Normal breath sounds.   Abdominal:      General: Bowel sounds are normal. There is no distension.      Palpations: Abdomen is soft.      Tenderness: There is no abdominal tenderness.   Musculoskeletal:      Cervical back: Neck supple.   Skin:     General: Skin is warm and dry.      Findings: No rash.   Psychiatric:         Behavior: Behavior normal.           Jonah Stone MD  Family Medicine

## 2024-06-12 ENCOUNTER — LAB VISIT (OUTPATIENT)
Dept: LAB | Facility: HOSPITAL | Age: 84
End: 2024-06-12
Attending: FAMILY MEDICINE
Payer: MEDICARE

## 2024-06-12 DIAGNOSIS — Z79.899 DRUG THERAPY: ICD-10-CM

## 2024-06-12 LAB
ALBUMIN SERPL BCP-MCNC: 3.9 G/DL (ref 3.5–5.2)
ALP SERPL-CCNC: 67 U/L (ref 55–135)
ALT SERPL W/O P-5'-P-CCNC: 22 U/L (ref 10–44)
ANION GAP SERPL CALC-SCNC: 10 MMOL/L (ref 8–16)
AST SERPL-CCNC: 37 U/L (ref 10–40)
BASOPHILS # BLD AUTO: 0.02 K/UL (ref 0–0.2)
BASOPHILS NFR BLD: 0.3 % (ref 0–1.9)
BILIRUB SERPL-MCNC: 0.6 MG/DL (ref 0.1–1)
BUN SERPL-MCNC: 15 MG/DL (ref 8–23)
CALCIUM SERPL-MCNC: 9.7 MG/DL (ref 8.7–10.5)
CHLORIDE SERPL-SCNC: 103 MMOL/L (ref 95–110)
CHOLEST SERPL-MCNC: 179 MG/DL (ref 120–199)
CHOLEST/HDLC SERPL: 2.8 {RATIO} (ref 2–5)
CO2 SERPL-SCNC: 27 MMOL/L (ref 23–29)
CREAT SERPL-MCNC: 0.7 MG/DL (ref 0.5–1.4)
DIFFERENTIAL METHOD BLD: ABNORMAL
EOSINOPHIL # BLD AUTO: 0 K/UL (ref 0–0.5)
EOSINOPHIL NFR BLD: 0 % (ref 0–8)
ERYTHROCYTE [DISTWIDTH] IN BLOOD BY AUTOMATED COUNT: 15.1 % (ref 11.5–14.5)
EST. GFR  (NO RACE VARIABLE): >60 ML/MIN/1.73 M^2
ESTIMATED AVG GLUCOSE: 117 MG/DL (ref 68–131)
GLUCOSE SERPL-MCNC: 97 MG/DL (ref 70–110)
HBA1C MFR BLD: 5.7 % (ref 4–5.6)
HCT VFR BLD AUTO: 39.8 % (ref 37–48.5)
HDLC SERPL-MCNC: 64 MG/DL (ref 40–75)
HDLC SERPL: 35.8 % (ref 20–50)
HGB BLD-MCNC: 13.2 G/DL (ref 12–16)
IMM GRANULOCYTES # BLD AUTO: 0.02 K/UL (ref 0–0.04)
IMM GRANULOCYTES NFR BLD AUTO: 0.3 % (ref 0–0.5)
LDLC SERPL CALC-MCNC: 100.8 MG/DL (ref 63–159)
LYMPHOCYTES # BLD AUTO: 2.4 K/UL (ref 1–4.8)
LYMPHOCYTES NFR BLD: 33.1 % (ref 18–48)
MCH RBC QN AUTO: 28.8 PG (ref 27–31)
MCHC RBC AUTO-ENTMCNC: 33.2 G/DL (ref 32–36)
MCV RBC AUTO: 87 FL (ref 82–98)
MONOCYTES # BLD AUTO: 0.5 K/UL (ref 0.3–1)
MONOCYTES NFR BLD: 6.5 % (ref 4–15)
NEUTROPHILS # BLD AUTO: 4.4 K/UL (ref 1.8–7.7)
NEUTROPHILS NFR BLD: 59.8 % (ref 38–73)
NONHDLC SERPL-MCNC: 115 MG/DL
NRBC BLD-RTO: 0 /100 WBC
PLATELET # BLD AUTO: 377 K/UL (ref 150–450)
PMV BLD AUTO: 11.3 FL (ref 9.2–12.9)
POTASSIUM SERPL-SCNC: 3.9 MMOL/L (ref 3.5–5.1)
PROT SERPL-MCNC: 7.2 G/DL (ref 6–8.4)
RBC # BLD AUTO: 4.58 M/UL (ref 4–5.4)
SODIUM SERPL-SCNC: 140 MMOL/L (ref 136–145)
T4 FREE SERPL-MCNC: 1.04 NG/DL (ref 0.71–1.51)
TRIGL SERPL-MCNC: 71 MG/DL (ref 30–150)
TSH SERPL DL<=0.005 MIU/L-ACNC: 1.3 UIU/ML (ref 0.4–4)
WBC # BLD AUTO: 7.35 K/UL (ref 3.9–12.7)

## 2024-06-12 PROCEDURE — 85025 COMPLETE CBC W/AUTO DIFF WBC: CPT | Performed by: FAMILY MEDICINE

## 2024-06-12 PROCEDURE — 83036 HEMOGLOBIN GLYCOSYLATED A1C: CPT | Performed by: FAMILY MEDICINE

## 2024-06-12 PROCEDURE — 84439 ASSAY OF FREE THYROXINE: CPT | Performed by: FAMILY MEDICINE

## 2024-06-12 PROCEDURE — 80061 LIPID PANEL: CPT | Performed by: FAMILY MEDICINE

## 2024-06-12 PROCEDURE — 84443 ASSAY THYROID STIM HORMONE: CPT | Performed by: FAMILY MEDICINE

## 2024-06-12 PROCEDURE — 36415 COLL VENOUS BLD VENIPUNCTURE: CPT | Mod: PN | Performed by: FAMILY MEDICINE

## 2024-06-12 PROCEDURE — 80053 COMPREHEN METABOLIC PANEL: CPT | Performed by: FAMILY MEDICINE

## 2024-06-19 ENCOUNTER — TELEPHONE (OUTPATIENT)
Dept: SURGERY | Facility: HOSPITAL | Age: 84
End: 2024-06-19
Payer: MEDICARE

## 2024-06-19 NOTE — TELEPHONE ENCOUNTER
Pt scheduled for cataract extraction with IOL insertion for Mon., 6/24/2024. Pt states that she will need to reschedule at this time due to having pneumonia. Please call pt to reschedule. Thank you.

## 2024-07-11 ENCOUNTER — OFFICE VISIT (OUTPATIENT)
Dept: FAMILY MEDICINE | Facility: CLINIC | Age: 84
End: 2024-07-11
Payer: MEDICARE

## 2024-07-11 ENCOUNTER — TELEPHONE (OUTPATIENT)
Dept: FAMILY MEDICINE | Facility: CLINIC | Age: 84
End: 2024-07-11

## 2024-07-11 VITALS
HEART RATE: 94 BPM | SYSTOLIC BLOOD PRESSURE: 118 MMHG | HEIGHT: 63 IN | DIASTOLIC BLOOD PRESSURE: 64 MMHG | BODY MASS INDEX: 26.5 KG/M2 | WEIGHT: 149.56 LBS | RESPIRATION RATE: 20 BRPM | OXYGEN SATURATION: 95 %

## 2024-07-11 DIAGNOSIS — K59.09 CHRONIC CONSTIPATION: ICD-10-CM

## 2024-07-11 DIAGNOSIS — E55.9 VITAMIN D INSUFFICIENCY: ICD-10-CM

## 2024-07-11 DIAGNOSIS — J30.9 ALLERGIC RHINITIS, UNSPECIFIED SEASONALITY, UNSPECIFIED TRIGGER: ICD-10-CM

## 2024-07-11 DIAGNOSIS — Z82.71 FAMILY HISTORY OF POLYCYSTIC KIDNEY: ICD-10-CM

## 2024-07-11 DIAGNOSIS — Z23 IMMUNIZATION DUE: ICD-10-CM

## 2024-07-11 DIAGNOSIS — E74.39 GLUCOSE INTOLERANCE: Primary | ICD-10-CM

## 2024-07-11 DIAGNOSIS — M85.80 OSTEOPENIA, UNSPECIFIED LOCATION: ICD-10-CM

## 2024-07-11 DIAGNOSIS — D09.9 SQUAMOUS CELL CARCINOMA IN SITU: ICD-10-CM

## 2024-07-11 DIAGNOSIS — I99.8 VASCULAR INSUFFICIENCY: ICD-10-CM

## 2024-07-11 PROCEDURE — 99214 OFFICE O/P EST MOD 30 MIN: CPT | Mod: PBBFAC,PN | Performed by: FAMILY MEDICINE

## 2024-07-11 PROCEDURE — 99214 OFFICE O/P EST MOD 30 MIN: CPT | Mod: S$PBB,,, | Performed by: FAMILY MEDICINE

## 2024-07-11 PROCEDURE — G2211 COMPLEX E/M VISIT ADD ON: HCPCS | Mod: S$PBB,,, | Performed by: FAMILY MEDICINE

## 2024-07-11 PROCEDURE — 99999 PR PBB SHADOW E&M-EST. PATIENT-LVL IV: CPT | Mod: PBBFAC,,, | Performed by: FAMILY MEDICINE

## 2024-07-11 RX ORDER — AZELASTINE 1 MG/ML
1 SPRAY, METERED NASAL 2 TIMES DAILY
Qty: 90 ML | Refills: 3 | Status: SHIPPED | OUTPATIENT
Start: 2024-07-11 | End: 2025-07-11

## 2024-07-11 RX ORDER — LEVOCETIRIZINE DIHYDROCHLORIDE 5 MG/1
5 TABLET, FILM COATED ORAL NIGHTLY
Qty: 90 TABLET | Refills: 3 | Status: SHIPPED | OUTPATIENT
Start: 2024-07-11 | End: 2025-07-11

## 2024-07-11 NOTE — PROGRESS NOTES
THIS DOCUMENT WAS MADE IN PART WITH VOICE RECOGNITION SOFTWARE.  OCCASIONALLY THIS SOFTWARE WILL MISINTERPRET WORDS OR PHRASES.    Assessment and Plan:    1. Glucose intolerance        2. Osteopenia, unspecified location        3. Vitamin D insufficiency        4. Squamous cell carcinoma in situ        5. Vascular insufficiency        6. Allergic rhinitis, unspecified seasonality, unspecified trigger  azelastine (ASTELIN) 137 mcg (0.1 %) nasal spray    levocetirizine (XYZAL) 5 MG tablet      7. Chronic constipation        8. Family history of polycystic kidney        9. Immunization due           New medication Astelin for allergic rhinitis, Xyzal as well     Other chronic conditions reviewed    Counseled on immunizations     Chronic conditions stable    Visit today included increased complexity associated with the care of the episodic problem above addressed and managing the longitudinal care of the patient due to the serious and/or complex managed problem(s) .      ______________________________________________________________________  Subjective:    Chief Complaint:  Chief Complaint   Patient presents with    Follow-up        HPI:  Christa is a 84 y.o. year old     Follow-up cough/pneumonia noted at prior visit 1 month ago  Had persistent cough     Recent Wellness labs grossly unremarkable    HPV related skin cancer, perianal  Resected December 28, 2022  Pathology-squamous cell carcinoma in situ  Surgeon-Dr. Hinkle -- follow-up every 3 months     Overactive bladder   Nocturia 2x nightly   Prev med : Myrbetriq      Allergic rhinitis   Rx - Flonase + Claritin  Has air purifier at home      Nicotine dependence   In remission-quit in 2004     Vascular insufficiency      Heartburn   Tums as needed      Osteopenia, vitamin-D deficiency  + Fall risk   Taking 5000 units daily vitamin-D, 500 mg calcium daily  No recent fractures     Situational anxiety/mood disorder  Med-Xanax 0.25 mg daily p.r.n.     Atherosclerosis of  aorta  Previous LDL 90     FH Polycystic Kidney Disease   Father, GF, Sister    Glucose intolerance   Previous A1c 5.7%    Past Medical History:  Past Medical History:   Diagnosis Date    Cataract     OD       Past Surgical History:  Past Surgical History:   Procedure Laterality Date    CATARACT EXTRACTION W/  INTRAOCULAR LENS IMPLANT Left 2017    Dr. Nelson    EXAMINATION UNDER ANESTHESIA N/A 2022    Procedure: Exam under anesthesia;  Surgeon: Dutch Hinkle MD;  Location: [facility] OR;  Service: General;  Laterality: N/A;    RECTAL BIOPSY N/A 2022    Procedure: BIOPSY, RECTUM;  Surgeon: Dutch Hinkle MD;  Location: [facility] OR;  Service: General;  Laterality: N/A;       Family History:  Family History   Problem Relation Name Age of Onset    Glaucoma Neg Hx      Macular degeneration Neg Hx         Social History:  Social History     Socioeconomic History    Marital status:    Tobacco Use    Smoking status: Former     Current packs/day: 0.00     Types: Cigarettes     Quit date: 2004     Years since quittin.5    Smokeless tobacco: Never   Substance and Sexual Activity    Alcohol use: Not Currently    Drug use: Never     Social Determinants of Health     Financial Resource Strain: Low Risk  (2022)    Overall Financial Resource Strain (CARDIA)     Difficulty of Paying Living Expenses: Not hard at all   Food Insecurity: No Food Insecurity (2022)    Hunger Vital Sign     Worried About Running Out of Food in the Last Year: Never true     Ran Out of Food in the Last Year: Never true   Transportation Needs: No Transportation Needs (2022)    PRAPARE - Transportation     Lack of Transportation (Medical): No     Lack of Transportation (Non-Medical): No   Physical Activity: Insufficiently Active (2022)    Exercise Vital Sign     Days of Exercise per Week: 3 days     Minutes of Exercise per Session: 40 min   Stress: No Stress Concern Present (2022)    [employer] - Occupational Stress Questionnaire     Feeling of Stress : Only a little   Housing Stability: Unknown (12/7/2022)    Housing Stability Vital Sign     Unable to Pay for Housing in the Last Year: No     Unstable Housing in the Last Year: No       Medications:  Current Outpatient Medications on File Prior to Visit   Medication Sig Dispense Refill    cholecalciferol, vitamin D3, (VITAMIN D3) 125 mcg (5,000 unit) Tab Take 1 tablet (5,000 Units total) by mouth once daily. 90 tablet 3    fluticasone propionate (FLONASE) 50 mcg/actuation nasal spray 2 sprays (100 mcg total) by Each Nostril route once daily. 48 g 2    predniSONE (DELTASONE) 20 MG tablet Take 2 tablets by mouth daily 10 tablet 0    [DISCONTINUED] loratadine (CLARITIN REDITABS) 10 mg dissolvable tablet Take 10 mg by mouth once daily.      ALPRAZolam (XANAX) 0.25 MG tablet TAKE 1 TABLET(0.25 MG) BY MOUTH DAILY AS NEEDED FOR ANXIETY (Patient not taking: Reported on 6/6/2024) 30 tablet 3    [DISCONTINUED] albuterol (PROVENTIL/VENTOLIN HFA) 90 mcg/actuation inhaler Inhale 2 puffs into the lungs every 6 (six) hours as needed for Wheezing. Rescue (Patient not taking: Reported on 7/11/2024) 18 g 0    [DISCONTINUED] cetirizine (ZYRTEC) 10 MG tablet Take 10 mg by mouth once daily. (Patient not taking: Reported on 7/11/2024)       Current Facility-Administered Medications on File Prior to Visit   Medication Dose Route Frequency Provider Last Rate Last Admin    electrolyte-S (ISOLYTE)   Intravenous Continuous Torsten Lao MD        fentaNYL 50 mcg/mL injection 25 mcg  25 mcg Intravenous Q5 Min PRN Torsten Lao MD        HYDROmorphone (PF) injection 0.2 mg  0.2 mg Intravenous Q5 Min PRN Torsten Lao MD        lactated ringers infusion   Intravenous Continuous Torsten Lao MD 10 mL/hr at 12/28/22 0930 Restarted at 12/28/22 1040    LIDOcaine (PF) 10 mg/ml (1%) injection 10 mg  1 mL Intradermal Once Torsten Lao MD        oxyCODONE  "immediate release tablet 5 mg  5 mg Oral Q3H PRN Torsten Lao MD           Allergies:  Metal staples [surgical stainless steel], Penicillins, and Levofloxacin    Immunizations:  Immunization History   Administered Date(s) Administered    COVID-19, MRNA, LN-S, PF (MODERNA FULL 0.5 ML DOSE) 02/18/2021, 03/18/2021    Tdap 05/15/2019       Review of Systems:  Review of Systems   All other systems reviewed and are negative.      Objective:    Vitals:  Vitals:    07/11/24 0842   BP: 118/64   Pulse: 94   Resp: 20   SpO2: 95%   Weight: 67.8 kg (149 lb 9.3 oz)   Height: 5' 3" (1.6 m)   PainSc: 0-No pain         Physical Exam  Vitals reviewed.   Constitutional:       General: She is not in acute distress.  HENT:      Head: Normocephalic and atraumatic.   Eyes:      Pupils: Pupils are equal, round, and reactive to light.   Cardiovascular:      Rate and Rhythm: Normal rate and regular rhythm.      Heart sounds: No murmur heard.     No friction rub.   Pulmonary:      Effort: Pulmonary effort is normal.      Breath sounds: Normal breath sounds.   Abdominal:      General: Bowel sounds are normal. There is no distension.      Palpations: Abdomen is soft.      Tenderness: There is no abdominal tenderness.   Musculoskeletal:      Cervical back: Neck supple.   Skin:     General: Skin is warm and dry.      Findings: No rash.   Psychiatric:         Behavior: Behavior normal.             Jonah tSone MD  Family Medicine        "

## 2024-07-11 NOTE — LETTER
[facility]  [address]  [place] [address]  Phone: [phone]  Fax: [phone]           Dear Apartment Complex Management,        I am writing on behalf of my patient, Christa Tate, who resides at your apartment complex. Ms. Tate has been under my care for allergy symptoms which have been identified as potentially exacerbated by environmental factors, including possible exposure to mold.    Ms. Tate has reported severe allergy symptoms including cough, runny nose and sneezing that appear to correlate with her time spent in her current apartment. Given the nature and severity of her symptoms, I am recommending that Ms. Tate either be considered for relocation to a different apartment within your complex or that a comprehensive evaluation for mold presence and air quality be conducted in her current apartment.    Environmental factors such as mold can significantly impact the health and well-being of individuals with allergies, and in Ms. Tate's case, her symptoms are indicative of a potential environmental trigger. It is crucial for her health that steps are taken to ensure her living environment supports her medical needs.  Please let me know if there are any specific procedures or documentation required from my office to facilitate this request. I am available to provide further medical information or discuss Ms. Tate's condition in more detail if needed.    Thank you for your attention to this matter. Your prompt consideration and action in this regard are greatly appreciated and will contribute significantly to Ms. Tate's ongoing health management.      Sincerely,              Jonah Stone MD

## 2024-07-11 NOTE — LETTER
[facility]  [address]  [place] [address]  Phone: [phone]  Fax: [phone]           Dear Apartment Complex Management,        I am writing on behalf of my patient, Christa Bocanegra, who resides at your apartment complex. Ms. Bocanegra has been under my care for allergy symptoms which have been identified as potentially exacerbated by environmental factors, including possible exposure to mold.    Ms. Bocanegra has reported severe allergy symptoms including cough, runny nose and sneezing that appear to correlate with her time spent in her current apartment. Given the nature and severity of her symptoms, I am recommending that Ms. Bocanegra either be considered for relocation to a different apartment within your complex or that a comprehensive evaluation for mold presence and air quality be conducted in her current apartment.    Environmental factors such as mold can significantly impact the health and well-being of individuals with allergies, and in Ms. Bocanegra' case, her symptoms are indicative of a potential environmental trigger. It is crucial for her health that steps are taken to ensure her living environment supports her medical needs.  Please let me know if there are any specific procedures or documentation required from my office to facilitate this request. I am available to provide further medical information or discuss Ms. Bocanegra' condition in more detail if needed.    Thank you for your attention to this matter. Your prompt consideration and action in this regard are greatly appreciated and will contribute significantly to Ms. Bocanegra' ongoing health management.      Sincerely,              Jonah Stone MD

## 2024-08-22 ENCOUNTER — OFFICE VISIT (OUTPATIENT)
Dept: SURGERY | Facility: CLINIC | Age: 84
End: 2024-08-22
Payer: MEDICARE

## 2024-08-22 VITALS
SYSTOLIC BLOOD PRESSURE: 134 MMHG | TEMPERATURE: 97 F | HEIGHT: 63 IN | HEART RATE: 98 BPM | DIASTOLIC BLOOD PRESSURE: 64 MMHG | WEIGHT: 146.63 LBS | BODY MASS INDEX: 25.98 KG/M2

## 2024-08-22 DIAGNOSIS — Z86.007 HISTORY OF SQUAMOUS CELL CARCINOMA IN SITU (SCCIS) OF SKIN: Primary | ICD-10-CM

## 2024-08-22 PROCEDURE — 99999 PR PBB SHADOW E&M-EST. PATIENT-LVL III: CPT | Mod: PBBFAC,,, | Performed by: SURGERY

## 2024-08-22 PROCEDURE — 99213 OFFICE O/P EST LOW 20 MIN: CPT | Mod: PBBFAC,PO | Performed by: SURGERY

## 2024-08-22 PROCEDURE — 46600 DIAGNOSTIC ANOSCOPY SPX: CPT | Mod: PBBFAC,PO | Performed by: SURGERY

## 2024-08-22 RX ORDER — POLYETHYLENE GLYCOL 3350 17 G/17G
17 POWDER, FOR SOLUTION ORAL ONCE
COMMUNITY

## 2024-08-22 NOTE — PROGRESS NOTES
83 yo F whom I am familiar with.  She is s/p excision of perianal lesion from L posterior region in 12/22.   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  No invasive cancer present  Pt elected not to pursue reexcision, choosing to have close f/u.  Pt doing well.  No complaints or discomfort             AAOx3  Soft/nd/nt  Rectal: ext exam demonstrates scarring from previous excision.  No masses. ALEX with normal sphincter tone. Anoscopy with no visible lesions.      A/P: Hx of sq cell carcinoma in situ of perianal area.    Doing well    SUSHMA  RTC in 6 months

## 2024-10-14 DIAGNOSIS — H25.11 NUCLEAR SCLEROTIC CATARACT OF RIGHT EYE: Primary | ICD-10-CM

## 2024-10-16 RX ORDER — PREDNISOLONE/MOXIFLOX/BROMFEN 1 %-0.5 %
1 SUSPENSION, DROPS(FINAL DOSAGE FORM)(ML) OPHTHALMIC (EYE) 3 TIMES DAILY
Qty: 5 ML | Refills: 3 | Status: SHIPPED | OUTPATIENT
Start: 2024-10-16

## 2024-11-12 ENCOUNTER — OFFICE VISIT (OUTPATIENT)
Dept: HOME HEALTH SERVICES | Facility: CLINIC | Age: 84
End: 2024-11-12
Payer: MEDICARE

## 2024-11-12 ENCOUNTER — TELEPHONE (OUTPATIENT)
Dept: HOME HEALTH SERVICES | Facility: CLINIC | Age: 84
End: 2024-11-12

## 2024-11-12 ENCOUNTER — TELEPHONE (OUTPATIENT)
Dept: OPHTHALMOLOGY | Facility: CLINIC | Age: 84
End: 2024-11-12
Payer: MEDICARE

## 2024-11-12 VITALS
OXYGEN SATURATION: 97 % | BODY MASS INDEX: 25.87 KG/M2 | HEIGHT: 63 IN | SYSTOLIC BLOOD PRESSURE: 138 MMHG | HEART RATE: 75 BPM | WEIGHT: 146 LBS | DIASTOLIC BLOOD PRESSURE: 66 MMHG

## 2024-11-12 DIAGNOSIS — M81.0 OSTEOPOROSIS, UNSPECIFIED OSTEOPOROSIS TYPE, UNSPECIFIED PATHOLOGICAL FRACTURE PRESENCE: ICD-10-CM

## 2024-11-12 DIAGNOSIS — Z00.00 ENCOUNTER FOR PREVENTIVE HEALTH EXAMINATION: Primary | ICD-10-CM

## 2024-11-12 DIAGNOSIS — F39 UNSPECIFIED MOOD (AFFECTIVE) DISORDER: ICD-10-CM

## 2024-11-12 DIAGNOSIS — E55.9 VITAMIN D INSUFFICIENCY: ICD-10-CM

## 2024-11-12 DIAGNOSIS — I70.0 ATHEROSCLEROSIS OF AORTA: ICD-10-CM

## 2024-11-12 PROCEDURE — G0439 PPPS, SUBSEQ VISIT: HCPCS | Mod: S$GLB,,, | Performed by: NURSE PRACTITIONER

## 2024-11-12 RX ORDER — FAMOTIDINE 20 MG/1
20 TABLET, FILM COATED ORAL NIGHTLY PRN
COMMUNITY

## 2024-11-12 NOTE — TELEPHONE ENCOUNTER
Patient wanted to know if she could reschedule her surgery to [place] in January. Advised patient can still have toric IOL but won't have FLACS-FLACS is optional with surgery. Patient verbalized understanding. Will have Annabel call patient with new surgery date.

## 2024-11-12 NOTE — TELEPHONE ENCOUNTER
Dr Kirby -   Patient has a issue getting to [facility].    This is where her surgery is scheduled.   Can you call her to discuss where there are some other options?   Can anything be done at [place] [address]?   She requires  TORIC lens placed and she would need somewhere closer?   She wants Dr Kirby to perform the surgery.     Please call patient to address the issue.

## 2024-11-15 ENCOUNTER — TELEPHONE (OUTPATIENT)
Dept: OPHTHALMOLOGY | Facility: CLINIC | Age: 84
End: 2024-11-15
Payer: MEDICARE

## 2024-11-15 NOTE — TELEPHONE ENCOUNTER
----- Message from Sherry sent at 11/15/2024 10:32 AM CST -----  Contact: pt [phone]  Type: Needs Medical Advice  Who Called:  Pt     Best Call Back Number: [phone]    Additional Information:  Pt requesting a call back to discuss rx eye drops. Pt not sure which drops she needs.

## 2024-11-15 NOTE — TELEPHONE ENCOUNTER
Patient states was able to get her combo drops at [facility]-picked up 3 bottles. Reschedule surgery to January in [place] and drops will still be good by then.

## 2024-11-17 PROBLEM — M85.80 OSTEOPENIA: Status: RESOLVED | Noted: 2020-10-16 | Resolved: 2024-11-17

## 2024-11-17 NOTE — PROGRESS NOTES
"  Christa Tate presented for a follow-up Medicare AWV today. The following components were reviewed and updated:    Medical history  Family History  Social history  Allergies and Current Medications  Health Risk Assessment  Health Maintenance  Care Team    **See Completed Assessments for Annual Wellness visit with in the encounter summary    The following assessments were completed:  Depression Screening  Cognitive function Screening  Timed Get Up Test  Whisper Test      Opioid documentation:      Patient does not have a current opioid prescription.          Vitals:    11/12/24 0847   BP: 138/66   BP Location: Right arm   Patient Position: Sitting   Pulse: 75   SpO2: 97%   Weight: 66.2 kg (146 lb)   Height: 5' 3" (1.6 m)     Body mass index is 25.86 kg/m².       Physical Exam  Constitutional:       Appearance: Normal appearance.   HENT:      Head: Normocephalic and atraumatic.      Nose: Nose normal.      Mouth/Throat:      Mouth: Mucous membranes are moist.   Eyes:      Extraocular Movements: Extraocular movements intact.      Pupils: Pupils are equal, round, and reactive to light.   Cardiovascular:      Rate and Rhythm: Normal rate and regular rhythm.   Pulmonary:      Effort: Pulmonary effort is normal.      Breath sounds: Normal breath sounds.   Abdominal:      General: Bowel sounds are normal.      Palpations: Abdomen is soft.   Musculoskeletal:         General: Normal range of motion.      Cervical back: Normal range of motion and neck supple.   Skin:     General: Skin is warm and dry.   Neurological:      General: No focal deficit present.      Mental Status: She is alert and oriented to person, place, and time.   Psychiatric:         Mood and Affect: Mood normal.         Behavior: Behavior normal.           Diagnoses and health risks identified today and associated recommendations/orders:  1. Encounter for preventive health examination  Awv completed  2. Unspecified mood (affective) disorder  Has xanax " PRN      3. Atherosclerosis of aorta  Chronic and stable. Continue current treatment. Follow with PCP.      4. Vitamin D insufficiency  On replacement    5. Osteoporosis, unspecified osteoporosis type, unspecified pathological fracture presence  Fu Dexa - on replacement D      Provided Christa with a 5-10 year written screening schedule and personal prevention plan. Recommendations were developed using the USPSTF age appropriate recommendations. Education, counseling, and referrals were provided as needed.  After Visit Summary printed and given to patient which includes a list of additional screenings\tests needed.    No follow-ups on file.      Ying Fraga, DNP, APRN

## 2024-11-17 NOTE — PATIENT INSTRUCTIONS
Counseling and Referral of Other Preventative  (Italic type indicates deductible and co-insurance are waived)    Patient Name: Christa Tate  Today's Date: 11/17/2024    Health Maintenance       Date Due Completion Date    Pneumococcal Vaccines (Age 65+) (1 of 2 - PCV) Never done ---    Shingles Vaccine (1 of 2) Never done ---    RSV Vaccine (Age 60+ and Pregnant patients) (1 - 1-dose 75+ series) Never done ---    DEXA Scan 10/16/2023 10/16/2020    COVID-19 Vaccine (3 - 2024-25 season) 09/01/2024 3/18/2021    Influenza Vaccine (1) 06/30/2025 (Originally 9/1/2024) 12/21/2022 (Declined)    Override on 12/21/2022: Declined (Declined for season)    TETANUS VACCINE 05/15/2029 5/15/2019    Lipid Panel 06/12/2029 6/12/2024        No orders of the defined types were placed in this encounter.    The following information is provided to all patients.  This information is to help you find resources for any of the problems found today that may be affecting your health:                  Living healthy guide: www.[facility].[place].gov      Understanding Diabetes: www.diabetes.org      Eating healthy: www.cdc.gov/healthyweight      CDC home safety checklist: www.cdc.gov/steadi/patient.html      Agency on Aging: www.goea.[place].[facility]      Alcoholics anonymous (AA): www.aa.org      Physical Activity: www.[place].nih.gov/ld3jufq      Tobacco use: www.quitwithusla.org

## 2024-12-04 DIAGNOSIS — H25.11 NUCLEAR SCLEROTIC CATARACT OF RIGHT EYE: Primary | ICD-10-CM

## 2024-12-04 RX ORDER — PREDNISOLONE/MOXIFLOX/BROMFEN 1 %-0.5 %
1 SUSPENSION, DROPS(FINAL DOSAGE FORM)(ML) OPHTHALMIC (EYE) 3 TIMES DAILY
Qty: 5 ML | Refills: 3 | Status: SHIPPED | OUTPATIENT
Start: 2024-12-04

## 2025-01-24 ENCOUNTER — TELEPHONE (OUTPATIENT)
Dept: SURGERY | Facility: HOSPITAL | Age: 85
End: 2025-01-24
Payer: MEDICARE

## 2025-01-24 NOTE — TELEPHONE ENCOUNTER
"Just spoke with pt - pt states "I just spoke with Dr. Kirby's nurse & I postponed the procedure to 04/28/2025". Thank you.   " clear

## 2025-03-05 ENCOUNTER — TELEPHONE (OUTPATIENT)
Dept: FAMILY MEDICINE | Facility: CLINIC | Age: 85
End: 2025-03-05
Payer: MEDICARE

## 2025-03-05 NOTE — TELEPHONE ENCOUNTER
Called pt to get her scheduled for a hospital f/u. Pt's daughter answered the phone and said that her brother brought their mom to [facility] this morning due to pt's stomach hurting. Pt's daughter said that she went to the hospital this morning and she got discharged a little while later. Pt's daughter chose to schedule a hospital f/u appt with the NP due to everyone else being booked up.pt's daughter said thanks and hung up.

## 2025-03-07 ENCOUNTER — OFFICE VISIT (OUTPATIENT)
Dept: FAMILY MEDICINE | Facility: CLINIC | Age: 85
End: 2025-03-07
Payer: MEDICARE

## 2025-03-07 VITALS
HEART RATE: 84 BPM | HEIGHT: 63 IN | SYSTOLIC BLOOD PRESSURE: 128 MMHG | BODY MASS INDEX: 25.62 KG/M2 | WEIGHT: 144.63 LBS | DIASTOLIC BLOOD PRESSURE: 66 MMHG | OXYGEN SATURATION: 97 %

## 2025-03-07 DIAGNOSIS — I70.0 ATHEROSCLEROSIS OF AORTA: ICD-10-CM

## 2025-03-07 DIAGNOSIS — Z09 FOLLOW-UP EXAMINATION: ICD-10-CM

## 2025-03-07 DIAGNOSIS — K52.9 GASTROENTERITIS: ICD-10-CM

## 2025-03-07 DIAGNOSIS — K57.92 DIVERTICULITIS: Primary | ICD-10-CM

## 2025-03-07 DIAGNOSIS — E87.6 HYPOKALEMIA: ICD-10-CM

## 2025-03-07 DIAGNOSIS — F39 UNSPECIFIED MOOD (AFFECTIVE) DISORDER: ICD-10-CM

## 2025-03-07 PROCEDURE — 99215 OFFICE O/P EST HI 40 MIN: CPT | Mod: PBBFAC,PN | Performed by: NURSE PRACTITIONER

## 2025-03-07 PROCEDURE — 99999 PR PBB SHADOW E&M-EST. PATIENT-LVL V: CPT | Mod: PBBFAC,,, | Performed by: NURSE PRACTITIONER

## 2025-03-07 RX ORDER — METRONIDAZOLE 375 MG/1
375 CAPSULE ORAL 2 TIMES DAILY
COMMUNITY

## 2025-03-07 RX ORDER — DICYCLOMINE HYDROCHLORIDE 10 MG/1
10 CAPSULE ORAL
COMMUNITY

## 2025-03-07 RX ORDER — ONDANSETRON 4 MG/1
4 TABLET, ORALLY DISINTEGRATING ORAL ONCE
COMMUNITY

## 2025-03-07 RX ORDER — CIPROFLOXACIN 100 MG/1
100 TABLET, FILM COATED ORAL 2 TIMES DAILY
COMMUNITY

## 2025-03-07 NOTE — PROGRESS NOTES
THIS DOCUMENT WAS MADE IN PART WITH VOICE RECOGNITION SOFTWARE.  OCCASIONALLY THIS SOFTWARE WILL MISINTERPRET WORDS OR PHRASES.     Assessment and Plan:    Diverticulitis  Comments:  Symptoms improving.  Complete Flagyl and Cipro as prescribed  Follow up with GI  Orders:  -     Comprehensive Metabolic Panel; Future; Expected date: 03/07/2025  -     Ambulatory referral/consult to Gastroenterology; Future; Expected date: 03/14/2025  -     CBC Without Differential; Future; Expected date: 03/07/2025    Gastroenteritis  Comments:  Symptoms improved  Continue advancing diet as tolerated  Orders:  -     Ambulatory referral/consult to Gastroenterology; Future; Expected date: 03/14/2025    Hypokalemia  -     Comprehensive Metabolic Panel; Future; Expected date: 03/07/2025    Follow-up examination    Unspecified mood (affective) disorder  Comments:  Stable, denies symptoms  Xanax as needed  Continue to monitor  Follow up with PCP    Atherosclerosis of aorta  Comments:  Chronic and stable.   Follow with PCP.             Visit summary:  Hospital course and diagnostic studies reviewed in detail with patient during today's visit.    Patient advised to schedule follow up with GI.    Referral placed to GI.    Emergent conditions/ER precautions discussed.      Follow up if symptoms worsen or fail to improve.   ______________________________________________________________________  Subjective:    Chief Complaint:  ER follow up    HPI:  Christa is a 84 y.o. year old female with a past medical history noted below, here for ER follow up.  Patient presented to the ER at [facility] on March 5th after presenting to the ER complaining of nausea, vomiting and diarrhea with abdominal cramping that started the day before.  She was treated with IV fluids, Pepcid and Zofran with improvement.  She was diagnosed with gastroenteritis, mild diverticulitis.  When tolerating p.o. fluids she was discharged.  Patient discharged with Cipro,  Flagyl, Bentyl and Zofran prescriptions.  Advised to follow up with PCP and GI.  Seen Dr. Epps- she would like referral to a new GI doctor.      She reports diarrhea has resolved.  She reports nausea is better. No longer vomiting.  She reports some soreness to lower abdomen.  She reports  that she is taking cipro and Flagyl.  Advancing diet slowly.      See ER provider MDM below:    Medical Decision Making  Patient presenting with nausea, vomiting, diarrhea and improving crampy abdominal pain since yesterday afternoon.  She is afebrile and her vitals are stable.  Heart rate is minimally elevated at 101 in triage.  Blood pressure is stable.  She is not toxic appearing.  Abdominal exam without peritoneal signs.  Prior medical records were reviewed.  Labs, EKG, chest x-ray were obtained and independently interpreted by myself.  EKG without evidence of acute life-threatening arrhythmia or ischemia.  High sensitivity troponin x1 is negative.  With length of time of symptoms, acute MI has been ruled out.  I do not suspect cardiac equivalent.  Chest x-ray without free air under the diaphragm, focal consolidation, pulmonary venous congestion or acute findings  COVID-19 is negative.  White blood cell count is normal 9.8 with a left shift but no bandemia.  Hemoglobin is normal 14.1.  Patient is not anemic.  Platelet count is normal.  Chemistries show mild hypokalemia 3.2; no other significant electrolyte abnormalities.  Blood sugar is normal.  Renal function is unremarkable.  Liver function tests and lipase are negative.  No hepatitis or pancreatitis.  Urinalysis without evidence of infection.  A CT scan without IV contrast was obtained and interpreted by radiologist and:  IMPRESSION:   1. No renal or ureteral calculi are present. There is no evidence of hydronephrosis or hydroureter.  2.  There are innumerable diverticuli throughout the sigmoid and descending colon. Subtle inflammatory changes in a pericolonic location  adjacent to the sigmoid colon may represent mild diverticulitis. There is no evidence of free air or abscess formation.  3. Fluid-filled loops of small bowel are suggestive of small bowel ileus or enteritis. No abnormally dilated loops of bowel are identified. There is no evidence of bowel obstruction.  4. The appendix is visualized and is normal.  For treatment, patient was given 1 L IV fluid, Zofran/Pepcid IV.  She was monitored and remained hemodynamically stable and had complete resolution of her symptoms.  No further vomiting here in the ED.  Serial abdominal exams remained benign.  I do not suspect ileus.  I suspect more gastroenteritis.  Patient does not wish to be admitted into the hospital.  She was actually anxious to get home.  She is tolerating p.o..  We will discharge with a prescription for Cipro, Flagyl, Zofran.  Increase potassium in diet discussed.  She states she is going to eat a banana when she gets home.  Follow up PCP and GI.  Strict return precautions.  I do not suspect ischemic bowel.     Chronic conditions reviewed below:    HPV related skin cancer, perianal  Resected December 28, 2022  Pathology-squamous cell carcinoma in situ  Surgeon-Dr. Hinkle -- follow-up every 3 months     Overactive bladder   Nocturia 2x nightly   Prev med : Myrbetriq      Allergic rhinitis   Rx - Flonase + Claritin  Has air purifier at home      Nicotine dependence   In remission-quit in 2004     Vascular insufficiency      Heartburn   Tums as needed      Osteopenia, vitamin-D deficiency  + Fall risk   Taking 5000 units daily vitamin-D, 500 mg calcium daily  No recent fractures     Situational anxiety/mood disorder  Med-Xanax 0.25 mg daily p.r.n.     Atherosclerosis of aorta  Previous LDL 90     FH Polycystic Kidney Disease   Father, GF, Sister     Glucose intolerance   Previous A1c 5.7%        Medications:  Medications Ordered Prior to Encounter[1]    Review of Systems:  Review of Systems   Constitutional:  Negative  "for fatigue and unexpected weight change.   HENT:  Negative for congestion, postnasal drip and rhinorrhea.    Respiratory:  Negative for cough and shortness of breath.    Gastrointestinal:  Positive for abdominal pain. Negative for abdominal distention, constipation, diarrhea, nausea and vomiting.   Genitourinary:  Negative for difficulty urinating and dysuria.   Musculoskeletal:  Negative for arthralgias and back pain.   Neurological:  Negative for dizziness and headaches.       Past Medical History:  Past Medical History:   Diagnosis Date    Cataract     OD       Objective:    Vitals:  Vitals:    03/07/25 1058   BP: 128/66   Pulse: 84   SpO2: 97%   Weight: 65.6 kg (144 lb 10 oz)   Height: 5' 3" (1.6 m)   PainSc:   4       Physical Exam  Vitals and nursing note reviewed.   Constitutional:       General: She is not in acute distress.  HENT:      Head: Normocephalic and atraumatic.   Eyes:      General: No scleral icterus.     Conjunctiva/sclera: Conjunctivae normal.   Cardiovascular:      Rate and Rhythm: Normal rate and regular rhythm.   Pulmonary:      Effort: Pulmonary effort is normal. No respiratory distress.      Breath sounds: Normal breath sounds.   Abdominal:      General: Bowel sounds are normal. There is no distension.      Palpations: Abdomen is soft.      Tenderness: There is no abdominal tenderness. There is no right CVA tenderness or left CVA tenderness.   Musculoskeletal:      Right lower leg: No edema.      Left lower leg: No edema.   Skin:     General: Skin is warm and dry.   Neurological:      Mental Status: She is alert and oriented to person, place, and time.   Psychiatric:         Mood and Affect: Mood normal.         Behavior: Behavior normal.         Thought Content: Thought content normal.         Data:                Medical history reviewed, Medications reconciled.              JUSTIN Romo  Family Medicine         [1]   Current Outpatient Medications on File Prior to Visit "   Medication Sig Dispense Refill    azelastine (ASTELIN) 137 mcg (0.1 %) nasal spray 1 spray (137 mcg total) by Nasal route 2 (two) times daily. For allergies 90 mL 3    cholecalciferol, vitamin D3, (VITAMIN D3) 125 mcg (5,000 unit) Tab Take 1 tablet (5,000 Units total) by mouth once daily. 90 tablet 3    ciprofloxacin HCl (CIPRO) 100 MG tablet Take 100 mg by mouth 2 (two) times daily.      dicyclomine (BENTYL) 10 MG capsule Take 10 mg by mouth 4 (four) times daily before meals and nightly.      famotidine (PEPCID) 20 MG tablet Take 20 mg by mouth nightly as needed for Heartburn.      fluticasone propionate (FLONASE) 50 mcg/actuation nasal spray 2 sprays (100 mcg total) by Each Nostril route once daily. 48 g 2    levocetirizine (XYZAL) 5 MG tablet Take 1 tablet (5 mg total) by mouth every evening. 90 tablet 3    metroNIDAZOLE (FLAGYL) 375 mg capsule Take 375 mg by mouth 2 (two) times daily.      ondansetron (ZOFRAN-ODT) 4 MG TbDL Take 4 mg by mouth once.      polyethylene glycol (GLYCOLAX) 17 gram PwPk Take 17 g by mouth once.      prednisoLONE-moxiflox-bromfen 1-0.5-0.075 % DrpS Apply 1 drop to eye 3 (three) times daily. 5 mL 3    ALPRAZolam (XANAX) 0.25 MG tablet TAKE 1 TABLET(0.25 MG) BY MOUTH DAILY AS NEEDED FOR ANXIETY (Patient not taking: Reported on 3/7/2025) 30 tablet 3    [DISCONTINUED] prednisoLONE-moxiflox-bromfen 1-0.5-0.075 % DrpS Apply 1 drop to eye 3 (three) times daily. (Patient not taking: Reported on 3/7/2025) 5 mL 3     Current Facility-Administered Medications on File Prior to Visit   Medication Dose Route Frequency Provider Last Rate Last Admin    electrolyte-S (ISOLYTE)   Intravenous Continuous Torsten Lao MD        lactated ringers infusion   Intravenous Continuous Torsten Lao MD 10 mL/hr at 12/28/22 0930 Restarted at 12/28/22 1040    LIDOcaine (PF) 10 mg/ml (1%) injection 10 mg  1 mL Intradermal Once Torsten Lao MD

## 2025-03-10 ENCOUNTER — TELEPHONE (OUTPATIENT)
Dept: FAMILY MEDICINE | Facility: CLINIC | Age: 85
End: 2025-03-10
Payer: MEDICARE

## 2025-03-10 NOTE — TELEPHONE ENCOUNTER
Rt pt call, pt stated that she don't have much of appetite, but she is able to handle meal placements and protein shakes so she drinking those. Said she going finish antibiotics and doing blood work on Thursday.

## 2025-03-10 NOTE — TELEPHONE ENCOUNTER
----- Message from Suresh sent at 3/10/2025  2:26 PM CDT -----  Contact: Pt [phone]  Type:  Needs Medical AdviceWho Called: PtSymptoms (please be specific): Stomach discomfort  How long has patient had these symptoms:  3 daysPharmacy name and phone #:  [id number] [address] [address] [address] & [facility]  [address] [address]Evzcl: [phone] Fax: [phone] Would the patient rather a call back or a response via [facility]? [facility]Be Call Back Number: [phone] Information: Pt has questions about her medications metroNIDAZOLE (FLAGYL) 375 mg capsule and ciprofloxacin HCl (CIPRO) 100 MG tablet. Pt adv it is causing her stomach discomfort and she has no appetite. Adv she has been able to drink meal replacement shakes. Pls call back and adv. Thank you.

## 2025-03-13 ENCOUNTER — LAB VISIT (OUTPATIENT)
Dept: LAB | Facility: HOSPITAL | Age: 85
End: 2025-03-13
Attending: NURSE PRACTITIONER
Payer: MEDICARE

## 2025-03-13 DIAGNOSIS — E87.6 HYPOKALEMIA: ICD-10-CM

## 2025-03-13 DIAGNOSIS — K57.92 DIVERTICULITIS: ICD-10-CM

## 2025-03-13 LAB
ALBUMIN SERPL BCP-MCNC: 3.6 G/DL (ref 3.5–5.2)
ALP SERPL-CCNC: 67 U/L (ref 40–150)
ALT SERPL W/O P-5'-P-CCNC: 28 U/L (ref 10–44)
ANION GAP SERPL CALC-SCNC: 9 MMOL/L (ref 8–16)
AST SERPL-CCNC: 54 U/L (ref 10–40)
BILIRUB SERPL-MCNC: 0.5 MG/DL (ref 0.1–1)
BUN SERPL-MCNC: 7 MG/DL (ref 8–23)
CALCIUM SERPL-MCNC: 9 MG/DL (ref 8.7–10.5)
CHLORIDE SERPL-SCNC: 102 MMOL/L (ref 95–110)
CO2 SERPL-SCNC: 30 MMOL/L (ref 23–29)
CREAT SERPL-MCNC: 0.6 MG/DL (ref 0.5–1.4)
ERYTHROCYTE [DISTWIDTH] IN BLOOD BY AUTOMATED COUNT: 14.5 % (ref 11.5–14.5)
EST. GFR  (NO RACE VARIABLE): >60 ML/MIN/1.73 M^2
GLUCOSE SERPL-MCNC: 105 MG/DL (ref 70–110)
HCT VFR BLD AUTO: 37.6 % (ref 37–48.5)
HGB BLD-MCNC: 12.5 G/DL (ref 12–16)
MCH RBC QN AUTO: 28.9 PG (ref 27–31)
MCHC RBC AUTO-ENTMCNC: 33.2 G/DL (ref 32–36)
MCV RBC AUTO: 87 FL (ref 82–98)
PLATELET # BLD AUTO: 306 K/UL (ref 150–450)
PMV BLD AUTO: 11.1 FL (ref 9.2–12.9)
POTASSIUM SERPL-SCNC: 3.3 MMOL/L (ref 3.5–5.1)
PROT SERPL-MCNC: 6.6 G/DL (ref 6–8.4)
RBC # BLD AUTO: 4.33 M/UL (ref 4–5.4)
SODIUM SERPL-SCNC: 141 MMOL/L (ref 136–145)
WBC # BLD AUTO: 7.28 K/UL (ref 3.9–12.7)

## 2025-03-13 PROCEDURE — 80053 COMPREHEN METABOLIC PANEL: CPT | Performed by: NURSE PRACTITIONER

## 2025-03-13 PROCEDURE — 36415 COLL VENOUS BLD VENIPUNCTURE: CPT | Mod: PN | Performed by: NURSE PRACTITIONER

## 2025-03-13 PROCEDURE — 85027 COMPLETE CBC AUTOMATED: CPT | Performed by: NURSE PRACTITIONER

## 2025-03-17 ENCOUNTER — PATIENT OUTREACH (OUTPATIENT)
Dept: ADMINISTRATIVE | Facility: HOSPITAL | Age: 85
End: 2025-03-17
Payer: MEDICARE

## 2025-03-17 NOTE — PROGRESS NOTES
VBC Patients with Rising Risk of ED/Admission >= 20 Points in 30 Days report    Pt seen by [facility] 3.7.2025

## 2025-03-18 ENCOUNTER — TELEPHONE (OUTPATIENT)
Dept: GASTROENTEROLOGY | Facility: CLINIC | Age: 85
End: 2025-03-18
Payer: MEDICARE

## 2025-03-18 ENCOUNTER — OFFICE VISIT (OUTPATIENT)
Dept: GASTROENTEROLOGY | Facility: CLINIC | Age: 85
End: 2025-03-18
Payer: MEDICARE

## 2025-03-18 VITALS — HEIGHT: 63 IN | BODY MASS INDEX: 25.04 KG/M2 | WEIGHT: 141.31 LBS

## 2025-03-18 DIAGNOSIS — R63.4 WEIGHT LOSS: ICD-10-CM

## 2025-03-18 DIAGNOSIS — B37.31 VAGINAL YEAST INFECTION: ICD-10-CM

## 2025-03-18 DIAGNOSIS — R11.0 NAUSEA: ICD-10-CM

## 2025-03-18 DIAGNOSIS — Z76.89 ENCOUNTER TO ESTABLISH CARE: ICD-10-CM

## 2025-03-18 DIAGNOSIS — R10.30 LOWER ABDOMINAL PAIN: ICD-10-CM

## 2025-03-18 DIAGNOSIS — R93.3 ABNORMAL CT SCAN, COLON: ICD-10-CM

## 2025-03-18 DIAGNOSIS — K57.92 DIVERTICULITIS: ICD-10-CM

## 2025-03-18 DIAGNOSIS — R13.10 PILL DYSPHAGIA: ICD-10-CM

## 2025-03-18 DIAGNOSIS — Z09 HOSPITAL DISCHARGE FOLLOW-UP: Primary | ICD-10-CM

## 2025-03-18 DIAGNOSIS — R19.4 CHANGE IN BOWEL HABITS: ICD-10-CM

## 2025-03-18 DIAGNOSIS — R19.7 DIARRHEA, UNSPECIFIED TYPE: ICD-10-CM

## 2025-03-18 DIAGNOSIS — K21.9 GASTROESOPHAGEAL REFLUX DISEASE, UNSPECIFIED WHETHER ESOPHAGITIS PRESENT: ICD-10-CM

## 2025-03-18 PROCEDURE — 99999 PR PBB SHADOW E&M-EST. PATIENT-LVL V: CPT | Mod: PBBFAC,,,

## 2025-03-18 PROCEDURE — 99215 OFFICE O/P EST HI 40 MIN: CPT | Mod: PBBFAC,PO

## 2025-03-18 PROCEDURE — 99214 OFFICE O/P EST MOD 30 MIN: CPT | Mod: S$PBB,,,

## 2025-03-18 RX ORDER — FLUCONAZOLE 150 MG/1
150 TABLET ORAL DAILY
Qty: 1 TABLET | Refills: 0 | Status: SHIPPED | OUTPATIENT
Start: 2025-03-18 | End: 2025-03-19

## 2025-03-18 RX ORDER — FAMOTIDINE 20 MG/1
20 TABLET, FILM COATED ORAL DAILY
Qty: 90 TABLET | Refills: 0 | Status: SHIPPED | OUTPATIENT
Start: 2025-03-18 | End: 2025-06-16

## 2025-03-18 RX ORDER — DICYCLOMINE HYDROCHLORIDE 10 MG/1
10 CAPSULE ORAL 4 TIMES DAILY PRN
Qty: 120 CAPSULE | Refills: 0 | Status: SHIPPED | OUTPATIENT
Start: 2025-03-18 | End: 2025-04-17

## 2025-03-18 NOTE — PATIENT INSTRUCTIONS
- schedule Colonoscopy to be done in 8 weeks, discussed procedure with the patient, verbalized understanding  - discussed the diagnosis of diverticulosis and diverticulitis, advised to continue a low fiber diet for the next 4 weeks and then slowly increase fiber in diet. Advised a low fiber diet is recommended for diverticulitis (for about 4 weeks), but to prevent diverticulitis, high fiber diet is recommended.  -Recommended high fiber diet after episode has completely resolved. Recommended daily exercise, adequate water intake (six 8-oz glasses of water daily), and high fiber diet. OTC fiber supplements are recommended if diet does not reach daily fiber goal (25 grams daily), such as Metamucil, Citrucel, or FiberCon (take as directed, separate from other oral medications by >2 hours).  - instructed patient that if symptoms do not resolve or if worsen prior to colonoscopy to contact us or go to ER, patient verbalized understanding  - discussed with patient that if episodes of diverticulitis recur frequently, may need to consult general surgery  - may take Bentyl for pain and diarrhea as prescribed  - recommend OTC probiotic, such as Align, Florastor or Culturelle, taken as directed on packaging  - avoid known triggers

## 2025-03-18 NOTE — PROGRESS NOTES
"Subjective:       Patient ID: Christa Tate is a 84 y.o. female Body mass index is 25.03 kg/m².    Chief Complaint: Abdominal Pain, Diverticulitis, and Hospital Follow Up    This patient is new to me.  Referring Provider: Zena Carlin for gastroenteritis and diverticulitis.  Former patient of Dr. Epps.     Patient's son present and assisting with history.    Reviewed hospital ER report from 03/05/25,  "Medical Decision Making  Patient presenting with nausea, vomiting, diarrhea and improving crampy abdominal pain since yesterday afternoon. She is afebrile and her vitals are stable. Heart rate is minimally elevated at 101 in triage. Blood pressure is stable. She is not toxic appearing. Abdominal exam without peritoneal signs.  Prior medical records were reviewed.  Labs, EKG, chest x-ray were obtained and independently interpreted by myself.  EKG without evidence of acute life-threatening arrhythmia or ischemia. High sensitivity troponin x1 is negative. With length of time of symptoms, acute MI has been ruled out. I do not suspect cardiac equivalent.  Chest x-ray without free air under the diaphragm, focal consolidation, pulmonary venous congestion or acute findings  COVID-19 is negative.  White blood cell count is normal 9.8 with a left shift but no bandemia. Hemoglobin is normal 14.1. Patient is not anemic. Platelet count is normal.  Chemistries show mild hypokalemia 3.2; no other significant electrolyte abnormalities. Blood sugar is normal. Renal function is unremarkable. Liver function tests and lipase are negative. No hepatitis or pancreatitis.  Urinalysis without evidence of infection.  A CT scan without IV contrast was obtained and interpreted by radiologist and:  IMPRESSION:   1. No renal or ureteral calculi are present. There is no evidence of hydronephrosis or hydroureter.  2. There are innumerable diverticuli throughout the sigmoid and descending colon. Subtle inflammatory changes in a " "pericolonic location adjacent to the sigmoid colon may represent mild diverticulitis. There is no evidence of free air or abscess formation.  3. Fluid-filled loops of small bowel are suggestive of small bowel ileus or enteritis. No abnormally dilated loops of bowel are identified. There is no evidence of bowel obstruction.  4. The appendix is visualized and is normal.  For treatment, patient was given 1 L IV fluid, Zofran/Pepcid IV. She was monitored and remained hemodynamically stable and had complete resolution of her symptoms. No further vomiting here in the ED. Serial abdominal exams remained benign. I do not suspect ileus. I suspect more gastroenteritis. Patient does not wish to be admitted into the hospital. She was actually anxious to get home. She is tolerating p.o.. We will discharge with a prescription for Cipro, Flagyl, Zofran. Increase potassium in diet discussed. She states she is going to eat a banana when she gets home. Follow up PCP and GI. Strict return precautions. I do not suspect ischemic bowel."    GI Problem  The primary symptoms include weight loss (Documented weight loss of 5 lb since 11/12/2024), fatigue, abdominal pain, nausea (Occasional nausea that improves taking Zofran p.r.n. - has taken Zofran about 5 times since hospital discharge) and diarrhea. Primary symptoms do not include fever, vomiting, melena, hematemesis, jaundice or hematochezia.   The abdominal pain began more than 2 days ago (started months ago). The abdominal pain has been gradually improving since its onset. The abdominal pain is located in the RLQ, LLQ and suprapubic region. The abdominal pain does not radiate. The severity of the abdominal pain is 0/10 (Denies pain currently; intermittent sudden burning that worsens with walking or with palpation; improves with rest). The abdominal pain is relieved by nothing (Completed Cipro and Flagyl for diverticulitis; denies taking Bentyl).   The diarrhea began more than 1 week " ago (Started around 03/05/2025 after eating [employer] hamburger). The diarrhea is semi-solid (Rated stool 6 on [place] scale). Daily occurrences: Currently having up to 4 small BMs daily. Risk factors for illness producing diarrhea include recent antibiotic use and suspect food intake (Started after eating [employer]).   The illness is also significant for dysphagia (Intermittent pill dysphagia; reports having EGD with Dr. Silva about 6 months ago with dilation+) and bloating (and flatus). The illness does not include chills, odynophagia or constipation (History of chronic constipation that resolved around time of ER visit 03/05/2025, which is when she stopped taking MiraLax). Significant associated medical issues include GERD (Started the past year; takes Pepcid OTC p.r.n. with improvement) and diverticulitis (1st flare of diverticulitis diagnosed after ER visit 03/05/2025; patient completed course of Flagyl and Cipro). Associated medical issues do not include inflammatory bowel disease, gallstones, liver disease, alcohol abuse, PUD, gastric bypass, bowel resection, irritable bowel syndrome or hemorrhoids. Associated medical issues comments: Patient reports she has developed a vaginal yeast infection after taking Cipro and Flagyl.     Review of Systems   Constitutional:  Positive for activity change, appetite change, fatigue, unexpected weight change and weight loss (Documented weight loss of 5 lb since 11/12/2024). Negative for chills, diaphoresis and fever.   HENT:  Positive for trouble swallowing. Negative for sore throat.    Respiratory:  Negative for cough, choking and shortness of breath.    Cardiovascular:  Negative for chest pain.   Gastrointestinal:  Positive for abdominal pain, bloating (and flatus), diarrhea, dysphagia (Intermittent pill dysphagia; reports having EGD with Dr. Silva about 6 months ago with dilation+) and nausea (Occasional nausea that improves taking Zofran p.r.n. - has taken Zofran  about 5 times since hospital discharge). Negative for abdominal distention, anal bleeding, blood in stool, constipation (History of chronic constipation that resolved around time of ER visit 03/05/2025, which is when she stopped taking MiraLax), hematemesis, hematochezia, jaundice, melena, rectal pain and vomiting.       No LMP recorded. Patient is postmenopausal.  Past Medical History:   Diagnosis Date    Cataract     OD    Diverticulitis     Diverticulosis      Past Surgical History:   Procedure Laterality Date    CATARACT EXTRACTION W/  INTRAOCULAR LENS IMPLANT Left 09/27/2017    Dr. Nelson    COLONOSCOPY      [id number]    EXAMINATION UNDER ANESTHESIA N/A 12/28/2022    Procedure: Exam under anesthesia;  Surgeon: Dutch Hinkle MD;  Location: [facility] OR;  Service: General;  Laterality: N/A;    RECTAL BIOPSY N/A 12/28/2022    Procedure: BIOPSY, RECTUM;  Surgeon: Dutch Hinkle MD;  Location: [facility] OR;  Service: General;  Laterality: N/A;     Family History   Problem Relation Name Age of Onset    Diverticulitis Sister      Glaucoma Neg Hx      Macular degeneration Neg Hx      Colon cancer Neg Hx      Crohn's disease Neg Hx      Ulcerative colitis Neg Hx       Social History[1]  Wt Readings from Last 10 Encounters:   03/18/25 64.1 kg (141 lb 5 oz)   03/07/25 65.6 kg (144 lb 10 oz)   11/12/24 66.2 kg (146 lb)   08/22/24 66.5 kg (146 lb 9.7 oz)   07/11/24 67.8 kg (149 lb 9.3 oz)   06/06/24 67.4 kg (148 lb 11.2 oz)   05/16/24 67.5 kg (148 lb 14.7 oz)   04/12/24 70.9 kg (156 lb 4.9 oz)   04/11/24 71.8 kg (158 lb 4.6 oz)   03/20/24 70.9 kg (156 lb 4.9 oz)     Lab Results   Component Value Date    WBC 7.28 03/13/2025    HGB 12.5 03/13/2025    HCT 37.6 03/13/2025    MCV 87 03/13/2025     03/13/2025     CMP  Sodium   Date Value Ref Range Status   03/13/2025 141 136 - 145 mmol/L Final     Potassium   Date Value Ref Range Status   03/13/2025 3.3 (L) 3.5 - 5.1 mmol/L Final     Chloride   Date Value Ref Range Status    03/13/2025 102 95 - 110 mmol/L Final     CO2   Date Value Ref Range Status   03/13/2025 30 (H) 23 - 29 mmol/L Final     Glucose   Date Value Ref Range Status   03/13/2025 105 70 - 110 mg/dL Final     BUN   Date Value Ref Range Status   03/13/2025 7 (L) 8 - 23 mg/dL Final     Creatinine   Date Value Ref Range Status   03/13/2025 0.6 0.5 - 1.4 mg/dL Final     Calcium   Date Value Ref Range Status   03/13/2025 9.0 8.7 - 10.5 mg/dL Final     Total Protein   Date Value Ref Range Status   03/13/2025 6.6 6.0 - 8.4 g/dL Final     Albumin   Date Value Ref Range Status   03/13/2025 3.6 3.5 - 5.2 g/dL Final     Total Bilirubin   Date Value Ref Range Status   03/13/2025 0.5 0.1 - 1.0 mg/dL Final     Comment:     For infants and newborns, interpretation of results should be based  on gestational age, weight and in agreement with clinical  observations.    Premature Infant recommended reference ranges:  Up to 24 hours.............<8.0 mg/dL  Up to 48 hours............<12.0 mg/dL  3-5 days..................<15.0 mg/dL  6-29 days.................<15.0 mg/dL       Alkaline Phosphatase   Date Value Ref Range Status   03/13/2025 67 40 - 150 U/L Final     AST   Date Value Ref Range Status   03/13/2025 54 (H) 10 - 40 U/L Final     ALT   Date Value Ref Range Status   03/13/2025 28 10 - 44 U/L Final     Anion Gap   Date Value Ref Range Status   03/13/2025 9 8 - 16 mmol/L Final     eGFR if    Date Value Ref Range Status   01/14/2022 >60.0 >60 mL/min/1.73 m^2 Final     eGFR if non    Date Value Ref Range Status   01/14/2022 >60.0 >60 mL/min/1.73 m^2 Final     Comment:     Calculation used to obtain the estimated glomerular filtration  rate (eGFR) is the CKD-EPI equation.        Lab Results   Component Value Date    LIPASE 26 03/05/2025     Lab Results   Component Value Date    TSH 1.301 06/12/2024     Reviewed prior medical records including radiology report of CT abdomen and pelvis 03/05/2025, chest x-ray  03/05/2025 & endoscopy history (see surgical history).    Objective:      Physical Exam  Vitals and nursing note reviewed.   Constitutional:       General: She is not in acute distress.     Appearance: Normal appearance. She is not ill-appearing.   HENT:      Mouth/Throat:      Lips: Pink. No lesions.   Cardiovascular:      Pulses: Normal pulses.      Heart sounds: Normal heart sounds.   Pulmonary:      Effort: Pulmonary effort is normal. No respiratory distress.      Breath sounds: Normal breath sounds.   Abdominal:      General: Bowel sounds are normal. There is no distension or abdominal bruit. There are no signs of injury.      Palpations: Abdomen is soft. There is no shifting dullness, fluid wave, hepatomegaly, splenomegaly or mass.      Tenderness: There is abdominal tenderness (mild) in the right lower quadrant, suprapubic area and left lower quadrant. There is no guarding or rebound. Negative signs include Atkinosn's sign, Rovsing's sign and McBurney's sign.   Skin:     General: Skin is warm and dry.      Coloration: Skin is not jaundiced or pale.   Neurological:      Mental Status: She is alert and oriented to person, place, and time.   Psychiatric:         Attention and Perception: Attention normal.         Mood and Affect: Mood normal.         Speech: Speech normal.         Behavior: Behavior normal.         Assessment:       1. Hospital discharge follow-up    2. Diverticulitis    3. Lower abdominal pain    4. Abnormal CT scan, colon    5. Diarrhea, unspecified type    6. Change in bowel habits    7. Gastroesophageal reflux disease, unspecified whether esophagitis present    8. Nausea    9. Pill dysphagia    10. Weight loss    11. Vaginal yeast infection    12. Encounter to establish care        Plan:       Patient agreed to sign medical records release consent for us to obtain records from Dr. Epps of most recent EGD.    Hospital discharge follow-up    Diverticulitis, Lower abdominal pain & Abnormal CT  scan, colon  - schedule Colonoscopy to be done in 8 weeks, discussed procedure with the patient, verbalized understanding  - discussed the diagnosis of diverticulosis and diverticulitis, advised to continue a low fiber diet for the next 4 weeks and then slowly increase fiber in diet. Advised a low fiber diet is recommended for diverticulitis (for about 4 weeks), but to prevent diverticulitis, high fiber diet is recommended.  -Recommended high fiber diet after episode has completely resolved. Recommended daily exercise, adequate water intake (six 8-oz glasses of water daily), and high fiber diet. OTC fiber supplements are recommended if diet does not reach daily fiber goal (25 grams daily), such as Metamucil, Citrucel, or FiberCon (take as directed, separate from other oral medications by >2 hours).  - instructed patient that if symptoms do not resolve or if worsen prior to colonoscopy to contact us or go to ER, patient verbalized understanding  - discussed with patient that if episodes of diverticulitis recur frequently, may need to consult general surgery  -START: dicyclomine (BENTYL) 10 MG capsule; Take 1 capsule (10 mg total) by mouth 4 (four) times daily as needed (abdominal pain/diarrhea).  Dispense: 120 capsule; Refill: 0    Diarrhea, unspecified type &  Change in bowel habits  - schedule Colonoscopy to be done in 8 weeks, discussed procedure with the patient, verbalized understanding  - recommend OTC probiotic, such as Align, Florastor or Culturelle, taken as directed on packaging  - avoid lactose, alcohol, & caffeine  - avoid known triggers  -     H. pylori antigen, stool; Future; Expected date: 03/18/2025  -     Rotavirus antigen, stool; Future; Expected date: 03/18/2025  -     Adenovirus Molecular Detection, PCR, Non-Blood Stool; Future; Expected date: 03/18/2025  -     Giardia / Cryptosporidum, EIA; Future; Expected date: 03/18/2025  -     Stool Exam-Ova,Cysts,Parasites; Future; Expected date:  03/18/2025  -     Clostridium difficile EIA; Future; Expected date: 03/18/2025  -     Stool culture; Future; Expected date: 03/18/2025  -START: dicyclomine (BENTYL) 10 MG capsule; Take 1 capsule (10 mg total) by mouth 4 (four) times daily as needed (abdominal pain/diarrhea).  Dispense: 120 capsule; Refill: 0    Gastroesophageal reflux disease, unspecified whether esophagitis present & Nausea  -Avoid large meals, avoid eating within 2-3 hours of bedtime (avoid late night eating & lying down soon after eating), elevate head of bed if nocturnal symptoms are present, smoking cessation (if current smoker), & weight loss (if overweight).   -Avoid known foods which trigger reflux symptoms & to minimize/avoid high-fat foods, chocolate, caffeine, citrus, alcohol, & tomato products.  -Avoid/limit use of NSAID's, since they can cause GI upset, bleeding, and/or ulcers. If needed, take with food.  -START: famotidine (PEPCID) 20 MG tablet; Take 1 tablet (20 mg total) by mouth once daily.  Dispense: 90 tablet; Refill: 0  - may continue Zofran p.r.n. as prescribed   - consider EGD    Pill dysphagia  - Take medications one at a time with a full glass of water.  - possible UGI/esophagram/esophageal manometry if symptoms persist  - consider EGD    Weight loss  - encourage PO intake and daily calorie counts to ensure adequate nutrition is taken in, recommend at least 2,000 calories a day  - recommend nutritional drinks, such as Boost, Ensure or Glucerna, to supplement nutrition needs    Vaginal yeast infection  -START: fluconazole (DIFLUCAN) 150 MG Tab; Take 1 tablet (150 mg total) by mouth once daily. for 1 day  Dispense: 1 tablet; Refill: 0    Encounter to establish care  - patient would like referral to new PCP  -     Ambulatory referral/consult to Family Practice; Future; Expected date: 03/25/2025    Follow up in about 4 weeks (around 4/15/2025), or if symptoms worsen or fail to improve.      If no improvement in symptoms or  symptoms worsen, call/follow-up at clinic or go to ER.        Total time spent on the encounter includes face to face time and non-face to face time preparing to see the patient (eg, review of tests), Obtaining and/or reviewing separately obtained history, Documenting clinical information in the electronic or other health record, Independently interpreting results (not separately reported) and communicating results to the patient/family/caregiver, or Care coordination (not separately reported).     A dictation software program was used for this note. Please expect some simple typographical  errors in this note.         [1]   Social History  Tobacco Use    Smoking status: Former     Current packs/day: 0.00     Types: Cigarettes     Quit date: 2004     Years since quittin.2     Passive exposure: Past    Smokeless tobacco: Never   Substance Use Topics    Alcohol use: Not Currently    Drug use: Never

## 2025-03-18 NOTE — TELEPHONE ENCOUNTER
At today's ov, pt wanting to ask KEE Dias if she is fit to go on a 7 day cruise 3[birth year] for her birthday. Informed pt the NP is in another room but I will call pt once KEE Dias advises. Pt voiced understanding     *faxed medical record request for pt most recent c-scope/EGD

## 2025-03-19 ENCOUNTER — LAB VISIT (OUTPATIENT)
Dept: LAB | Facility: HOSPITAL | Age: 85
End: 2025-03-19
Payer: MEDICARE

## 2025-03-19 DIAGNOSIS — H25.11 NUCLEAR SCLEROTIC CATARACT OF RIGHT EYE: Primary | ICD-10-CM

## 2025-03-19 DIAGNOSIS — R19.7 DIARRHEA, UNSPECIFIED TYPE: ICD-10-CM

## 2025-03-19 PROCEDURE — 87338 HPYLORI STOOL AG IA: CPT

## 2025-03-19 PROCEDURE — 87046 STOOL CULTR AEROBIC BACT EA: CPT

## 2025-03-19 PROCEDURE — 87324 CLOSTRIDIUM AG IA: CPT

## 2025-03-19 PROCEDURE — 87328 CRYPTOSPORIDIUM AG IA: CPT

## 2025-03-19 PROCEDURE — 87045 FECES CULTURE AEROBIC BACT: CPT

## 2025-03-19 PROCEDURE — 87449 NOS EACH ORGANISM AG IA: CPT | Mod: 91

## 2025-03-19 PROCEDURE — 87493 C DIFF AMPLIFIED PROBE: CPT

## 2025-03-19 PROCEDURE — 87427 SHIGA-LIKE TOXIN AG IA: CPT

## 2025-03-19 PROCEDURE — 87425 ROTAVIRUS AG IA: CPT

## 2025-03-19 RX ORDER — PREDNISOLONE/MOXIFLOX/BROMFEN 1 %-0.5 %
1 SUSPENSION, DROPS(FINAL DOSAGE FORM)(ML) OPHTHALMIC (EYE) 3 TIMES DAILY
Qty: 8 ML | Refills: 3 | Status: SHIPPED | OUTPATIENT
Start: 2025-03-19

## 2025-03-20 ENCOUNTER — RESULTS FOLLOW-UP (OUTPATIENT)
Dept: GASTROENTEROLOGY | Facility: CLINIC | Age: 85
End: 2025-03-20

## 2025-03-20 LAB
C DIFF GDH STL QL: POSITIVE
C DIFF TOX A+B STL QL IA: NEGATIVE
C DIFF TOX GENS STL QL NAA+PROBE: NEGATIVE
CRYPTOSP AG STL QL IA: NEGATIVE
G LAMBLIA AG STL QL IA: NEGATIVE
H.PYLORI ANTIGEN INTERPRETATION: NEGATIVE
RV AG STL QL IA.RAPID: NEGATIVE

## 2025-03-21 ENCOUNTER — TELEPHONE (OUTPATIENT)
Dept: GASTROENTEROLOGY | Facility: CLINIC | Age: 85
End: 2025-03-21
Payer: MEDICARE

## 2025-03-21 NOTE — TELEPHONE ENCOUNTER
Returned phone call, pt was wanting to know what medication were sent in from Larissa Floyd NP office. Pt verbalized understanding of medications.

## 2025-03-21 NOTE — TELEPHONE ENCOUNTER
----- Message from [employer] sent at 3/21/2025 10:01 AM CDT -----  Type:  Needs Medical AdviceWho Called: the patientSymptoms (please be specific):How long has patient had these symptoms:  Pharmacy name and phone #:  Would the patient rather a call back or a response via [facility]? call back/Best Call Back Number: [phone] Additional Information: Pt states she would like to speak to staff in reference to which rx's to takePlease adviseThanks

## 2025-03-22 LAB
BACTERIA STL CULT: NORMAL
BACTERIA STL CULT: NORMAL
E COLI SXT1 STL QL IA: NEGATIVE
E COLI SXT2 STL QL IA: NEGATIVE
HADV DNA SERPL QL NAA+PROBE: NEGATIVE
SPECIMEN SOURCE: NORMAL

## 2025-03-23 ENCOUNTER — PATIENT OUTREACH (OUTPATIENT)
Facility: OTHER | Age: 85
End: 2025-03-23
Payer: MEDICARE

## 2025-03-23 ENCOUNTER — NURSE TRIAGE (OUTPATIENT)
Dept: ADMINISTRATIVE | Facility: CLINIC | Age: 85
End: 2025-03-23
Payer: MEDICARE

## 2025-03-23 ENCOUNTER — OCHSNER VIRTUAL EMERGENCY DEPARTMENT (OUTPATIENT)
Facility: CLINIC | Age: 85
End: 2025-03-23
Payer: MEDICARE

## 2025-03-23 DIAGNOSIS — K62.89 RECTAL PAIN: Primary | ICD-10-CM

## 2025-03-23 DIAGNOSIS — R68.83 CHILLS: ICD-10-CM

## 2025-03-23 DIAGNOSIS — R14.0 ABDOMINAL BLOATING: ICD-10-CM

## 2025-03-23 NOTE — PROGRESS NOTES
"Patient called and spoke to [facility] RN on call to report the following, "Pt reports she had C-Diff. She reports she has completed the prescribed treatment. C/O constipation (last BM was on 3/21/25), loss of appetite, bloating, chills, and rectal pain/pressure. Denies abdominal pain, blood in stools, fever, vomiting, SOB, and CP. H/O Diverticulitis, HPV, and Squamos Cell Carcinoma in Situ. Last stool was formed. She reports she is drinking plenty of fluids (water and Pedialyte)."    [facility] RN on call nurse consulted with Artur MD on call, Dr. Pillo Loco, and disposition was for patient to be seen in the Emergency Department, can be free standing. Follow up scheduled on 03/24/2025 to outreach the patient to assess for any additional needs/concerns following ED visit.     "

## 2025-03-23 NOTE — TELEPHONE ENCOUNTER
[place]    PCP:  Jonah Stone MD    Pt reports she had C-Diff.  She reports she has completed the prescribed treatment.  C/O constipation (last BM was on 3/21/25), loss of appetite, bloating, chills, and rectal pain/pressure.  Denies abdominal pain, blood in stools, fever, vomiting, SOB, and CP.  H/O Diverticulitis, HPV, and Squamos Cell Carcinoma in Situ.  Last stool was formed.  She reports she is drinking plenty of fluids (water and Pedialyte).  Per protocol, care advised is see HCP within 4 hrs.  Pt referred to [facility] Provider (Pillo Loco MD).  Artur Provider recommends:  go to ED, could be a free-standing ED for CT scan.  Pt VU and will go to the free-standing ED at [facility] at [address] [place].  Advised to call for worsening/questions/concerns.  VU.    Reason for Disposition   Unable to have a bowel movement (BM) without laxative or enema   SEVERE rectal pain (e.g., excruciating, unable to have a bowel movement)    Additional Information   Negative: [1] Vomiting AND [2] contains bile (green color)   Negative: Patient sounds very sick or weak to the triager   Negative: [1] Vomiting AND [2] abdomen looks much more swollen than usual   Negative: [1] Constant abdominal pain AND [2] present > 2 hours   Negative: [1] Rectal pain or fullness from fecal impaction (rectum full of stool) AND [2] NOT better after SITZ bath, suppository or enema   Negative: [1] MILD abdominal pain (e.g., does not interfere with normal activities) AND [2] pain comes and goes (cramps) AND [3] fever   Negative: Last bowel movement (BM) > 4 days ago   Negative: Leaking stool   Negative: Unable to have a bowel movement (BM) without manually removing stool (using finger to pull out stool or perform disimpaction)   Rectal pain or itching is main symptom   Negative: Injury to rectum   Negative: Large mass protruding out of rectum   Negative: Patient sounds very sick or weak to the triager    Protocols used: Constipation-A-AH,  Likely multifactorial but could be large part related to PE, also has moderate emphysema/COPD, and lung mass  - Supplemental O2  - Antithrombotics  - Mgmt of COPD per IM  - OP follow-up with pulm/oncology Rectal Symptoms-A-AH

## 2025-03-23 NOTE — PLAN OF CARE-OVED
[facility] Emergency Department Plan of Care Note  Referral Source: Nurse On-Call                               Chief Complaint   Patient presents with    Abdominal Pain     Decrease PO, bloating, chills, and rectal pain/pressure       Recommendation: Emergency Department            Emergency Department: [place]               Additional History: 87y/o with recent diverticulitis s/p treatment early March and c-diff + on 3/19, status post treatment complaining of decrease PO, bloating, chills, and rectal pain/pressure.        Additional Recommendations: Given her age, recent infections will send to ED for in person eval. She will go to [facility] ED as they have available CT imaging if needed.        Encounter Diagnoses   Name Primary?    Chills     Abdominal bloating     Rectal pain Yes

## 2025-03-23 NOTE — PLAN OF CARE-OVED
[facility] Plan of Care Note  [facility] Plan of Care Note    Referral source: Nurse On-Call      Reason for consult: Abdominal Pain      Additional History: 87y/o with recent diverticulitis s/p treatment early March and c-diff + on 3/19, status post treatment complaining of decrease PO, bloating, chills, and rectal pain/pressure.       Disposition recommended: Emergency Department      Additional Recommendations: Given her age, recent infections will send to ED for in person eval. She will go to [facility] ED as they have available CT imaging if needed.

## 2025-03-24 ENCOUNTER — PATIENT OUTREACH (OUTPATIENT)
Facility: OTHER | Age: 85
End: 2025-03-24
Payer: MEDICARE

## 2025-03-24 DIAGNOSIS — K59.00 CONSTIPATION, UNSPECIFIED CONSTIPATION TYPE: Primary | ICD-10-CM

## 2025-03-24 RX ORDER — POLYETHYLENE GLYCOL 3350, SODIUM SULFATE ANHYDROUS, SODIUM BICARBONATE, SODIUM CHLORIDE, POTASSIUM CHLORIDE 236; 22.74; 6.74; 5.86; 2.97 G/4L; G/4L; G/4L; G/4L; G/4L
240 POWDER, FOR SOLUTION ORAL ONCE
Qty: 240 ML | Refills: 0 | Status: SHIPPED | OUTPATIENT
Start: 2025-03-24 | End: 2025-03-24

## 2025-03-24 NOTE — PROGRESS NOTES
Patient seen in the ED on 3/23/25. ED AVS instructs patient to follow up with PCP. Called patient to follow up. Patient would like to schedule with PCP. Appointment scheduled for 3/25/2025 at 9:00 AM with Jonah Stone MD at [address] [facility] [place] [address]. Follow up scheduled for 3/26/25.

## 2025-03-24 NOTE — TELEPHONE ENCOUNTER
"Pt states she has an appt with Dr Stone tomorrow (PCP MD). She says she hasn't used the bathroom but she is taking Miralax everyday. The ER took an Xray but didn't do anything for it. She's asking if KEE Dias can "dig out" whatever is stopping her from making  a BM. Pt has appt with Dr Stone tomorrow but says she cannot keep waiting for relief. Offered pt appt at 0800 tomorrow but says it wont work due to appt with MD Chase. She says please call her back to let her know what Larissa says.     Please advise  "

## 2025-03-24 NOTE — TELEPHONE ENCOUNTER
Pt informed of Golytely, asks if she can take Miralax while taking Golytely. Per KEE Dias: She can. The bijan  is just to clear everything out and the miralax it to keep things moving even after.    Pt voiced understanding of medication called in as well as appt scheduled with KEE Dias

## 2025-03-25 ENCOUNTER — OFFICE VISIT (OUTPATIENT)
Dept: GASTROENTEROLOGY | Facility: CLINIC | Age: 85
End: 2025-03-25
Payer: MEDICARE

## 2025-03-25 VITALS — WEIGHT: 136.88 LBS | BODY MASS INDEX: 24.25 KG/M2 | HEIGHT: 63 IN

## 2025-03-25 DIAGNOSIS — R63.0 DECREASED APPETITE: ICD-10-CM

## 2025-03-25 DIAGNOSIS — Z87.19 HISTORY OF DIVERTICULITIS: ICD-10-CM

## 2025-03-25 DIAGNOSIS — R63.4 WEIGHT LOSS: ICD-10-CM

## 2025-03-25 DIAGNOSIS — R10.31 RLQ ABDOMINAL PAIN: ICD-10-CM

## 2025-03-25 DIAGNOSIS — K62.89 RECTAL MASS: ICD-10-CM

## 2025-03-25 DIAGNOSIS — R14.0 ABDOMINAL BLOATING: ICD-10-CM

## 2025-03-25 DIAGNOSIS — K59.04 CHRONIC IDIOPATHIC CONSTIPATION: Primary | ICD-10-CM

## 2025-03-25 DIAGNOSIS — R74.8 ELEVATED LIVER ENZYMES: ICD-10-CM

## 2025-03-25 LAB — O+P STL MICRO: NORMAL

## 2025-03-25 PROCEDURE — 99999 PR PBB SHADOW E&M-EST. PATIENT-LVL IV: CPT | Mod: PBBFAC,,,

## 2025-03-25 PROCEDURE — 99214 OFFICE O/P EST MOD 30 MIN: CPT | Mod: S$PBB,,,

## 2025-03-25 PROCEDURE — 99214 OFFICE O/P EST MOD 30 MIN: CPT | Mod: PBBFAC,PO

## 2025-03-26 ENCOUNTER — LAB VISIT (OUTPATIENT)
Dept: LAB | Facility: HOSPITAL | Age: 85
End: 2025-03-26
Payer: MEDICARE

## 2025-03-26 ENCOUNTER — PATIENT OUTREACH (OUTPATIENT)
Facility: OTHER | Age: 85
End: 2025-03-26
Payer: MEDICARE

## 2025-03-26 DIAGNOSIS — R74.8 ELEVATED LIVER ENZYMES: ICD-10-CM

## 2025-03-26 LAB
ALBUMIN SERPL BCP-MCNC: 4 G/DL (ref 3.5–5.2)
ALP SERPL-CCNC: 72 UNIT/L (ref 40–150)
ALT SERPL W/O P-5'-P-CCNC: 8 UNIT/L (ref 10–44)
AST SERPL-CCNC: 23 UNIT/L (ref 11–45)
BILIRUB DIRECT SERPL-MCNC: 0.3 MG/DL (ref 0.1–0.3)
BILIRUB SERPL-MCNC: 0.8 MG/DL (ref 0.1–1)
PROT SERPL-MCNC: 7.3 GM/DL (ref 6–8.4)

## 2025-03-26 PROCEDURE — 36415 COLL VENOUS BLD VENIPUNCTURE: CPT | Mod: PN

## 2025-03-26 PROCEDURE — 80076 HEPATIC FUNCTION PANEL: CPT

## 2025-03-26 NOTE — PROGRESS NOTES
Per chart review, patient did not attend primary care appointment on 3/25/25, however she attended a GI appointment. Called patient to follow up and assess additional needs.  Patient states that she finally has some relief from her constipation and is feeling better.  Patient also would like another appointment with primary care, scheduled for 4/4/2025 at 3:30 PM with Krystle Foster MD at [address] [facility] [place] [address].  Appointment reminder scheduled for 4/2/25.  Follow up call scheduled for 4/5/25.

## 2025-03-27 ENCOUNTER — RESULTS FOLLOW-UP (OUTPATIENT)
Dept: GASTROENTEROLOGY | Facility: CLINIC | Age: 85
End: 2025-03-27

## 2025-03-27 ENCOUNTER — OFFICE VISIT (OUTPATIENT)
Dept: SURGERY | Facility: CLINIC | Age: 85
End: 2025-03-27
Payer: MEDICARE

## 2025-03-27 ENCOUNTER — TELEPHONE (OUTPATIENT)
Dept: GASTROENTEROLOGY | Facility: CLINIC | Age: 85
End: 2025-03-27
Payer: MEDICARE

## 2025-03-27 VITALS
WEIGHT: 136.69 LBS | TEMPERATURE: 98 F | HEIGHT: 63 IN | DIASTOLIC BLOOD PRESSURE: 56 MMHG | HEART RATE: 85 BPM | SYSTOLIC BLOOD PRESSURE: 119 MMHG | BODY MASS INDEX: 24.22 KG/M2

## 2025-03-27 DIAGNOSIS — Z86.007 HISTORY OF SQUAMOUS CELL CARCINOMA IN SITU (SCCIS) OF SKIN: Primary | ICD-10-CM

## 2025-03-27 PROCEDURE — 99213 OFFICE O/P EST LOW 20 MIN: CPT | Mod: PBBFAC,PO | Performed by: SURGERY

## 2025-03-27 PROCEDURE — 99999 PR PBB SHADOW E&M-EST. PATIENT-LVL III: CPT | Mod: PBBFAC,,, | Performed by: SURGERY

## 2025-03-27 RX ORDER — POLYETHYLENE GLYCOL-3350 AND ELECTROLYTES 236; 6.74; 5.86; 2.97; 22.74 G/274.31G; G/274.31G; G/274.31G; G/274.31G; G/274.31G
POWDER, FOR SOLUTION ORAL
COMMUNITY
Start: 2025-03-24

## 2025-03-27 NOTE — TELEPHONE ENCOUNTER
Spoke with pt and she states she drank the whole bottle of mag citrate with plenty of water afterwards and nothing has happened yet. She says she was told to take the mag citrate but she don't know what else to do. She was under the impression she can drink the mag citrate and it helps immediately but I did inform pt to give the mag citrate time to work and continue to drink fluids. If she has worsening symptoms or concerns she can go to nearest ER but I will fwd the message to KEE Dias for her to review and advise. Larissa has left for the day but expected to return tomorrow.     Pt voiced understanding with no further questions/concerns at this time.

## 2025-03-27 NOTE — PROGRESS NOTES
84 yo F whom I am familiar with.  She is s/p excision of perianal lesion from L posterior region in 12/22.   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  No invasive cancer present  Pt elected not to pursue reexcision, choosing to have close f/u.  Pt doing well.  No complaints or discomfort            Pt returns for f/u.  SHe is concerned about persistent weight loss.  No n/v.  No fever/chlills      AFVSS  AAOx3  Soft/nd/nt  Rectal: ext exam with scarring noted posteriorly.  No visible lesion.  ALEX with normal sphincter tone.  Anoscopy demonstrating moderate internal hemorrhoids.  No visible or palpable lesions noted.      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

## 2025-03-28 NOTE — TELEPHONE ENCOUNTER
Called pt to notify what KEE Dias advises. Pt states she hasn't taken anything other than the mag yesterday, she says hours later she threw up and it was hot. After throwing up it was about 10 minutes, she had to go to the bathroom and it was diarrhea but this morning she went a little bit then ate breakfast, coffee/yogurt.     Asked pt what was the consistency to the stool this morning and she says it was liquid. As of now her diarrhea has stopped. Pt was very thankful and informed pt to call us for worsening or other GI concerns. Pt voiced understanding with no further questions or concerns at this time.

## 2025-04-01 ENCOUNTER — TELEPHONE (OUTPATIENT)
Dept: OPHTHALMOLOGY | Facility: CLINIC | Age: 85
End: 2025-04-01
Payer: MEDICARE

## 2025-04-02 ENCOUNTER — TREATMENT PLANNING (OUTPATIENT)
Dept: GASTROENTEROLOGY | Facility: CLINIC | Age: 85
End: 2025-04-02
Payer: MEDICARE

## 2025-04-02 NOTE — PROGRESS NOTES
Reviewed medical records received from Dr. Epps, summarized below and in medical & surgical history (endoscopies, etc), copies made & given to nurse to be scanned into system:     EGD 03/11/2024  Impression: Slightly ring to lower esophagus, biopsies taking, dilated with a 50 for salivary.  Minimal antritis, gastritis.  Tiny hiatal hernia.  Patho:  Antrum, biopsy:  Marked active chronic gastritis with numerous organisms morphologically consistent with H pylori present on routine H and E stain  Lower esophagus, biopsy:  Benign squamous epithelium with mild chronic inflammation and reactive features suggestive of low-grade reflux.  Negative for Bennett's metaplasia    Recommendations:  Continue with previous recommendations.  Repeat H pylori stool test negative 03/19/25.

## 2025-04-04 ENCOUNTER — LAB VISIT (OUTPATIENT)
Dept: LAB | Facility: HOSPITAL | Age: 85
End: 2025-04-04
Payer: MEDICARE

## 2025-04-04 ENCOUNTER — PATIENT OUTREACH (OUTPATIENT)
Facility: OTHER | Age: 85
End: 2025-04-04
Payer: MEDICARE

## 2025-04-04 ENCOUNTER — OFFICE VISIT (OUTPATIENT)
Dept: FAMILY MEDICINE | Facility: CLINIC | Age: 85
End: 2025-04-04
Payer: MEDICARE

## 2025-04-04 VITALS
BODY MASS INDEX: 24.3 KG/M2 | DIASTOLIC BLOOD PRESSURE: 78 MMHG | HEART RATE: 72 BPM | OXYGEN SATURATION: 97 % | HEIGHT: 63 IN | WEIGHT: 137.13 LBS | RESPIRATION RATE: 18 BRPM | SYSTOLIC BLOOD PRESSURE: 116 MMHG

## 2025-04-04 DIAGNOSIS — R63.4 UNINTENTIONAL WEIGHT LOSS: ICD-10-CM

## 2025-04-04 DIAGNOSIS — R63.4 UNINTENTIONAL WEIGHT LOSS: Primary | ICD-10-CM

## 2025-04-04 DIAGNOSIS — Z74.09 IMPAIRED FUNCTIONAL MOBILITY, BALANCE, GAIT, AND ENDURANCE: ICD-10-CM

## 2025-04-04 PROBLEM — R13.14 DYSPHAGIA, PHARYNGOESOPHAGEAL PHASE: Status: ACTIVE | Noted: 2025-04-04

## 2025-04-04 PROBLEM — D09.9 SQUAMOUS CELL CARCINOMA IN SITU: Chronic | Status: ACTIVE | Noted: 2023-01-17

## 2025-04-04 PROBLEM — N32.81 OAB (OVERACTIVE BLADDER): Chronic | Status: ACTIVE | Noted: 2022-04-01

## 2025-04-04 PROBLEM — E55.9 VITAMIN D INSUFFICIENCY: Chronic | Status: ACTIVE | Noted: 2021-02-25

## 2025-04-04 PROBLEM — R60.0 PERIPHERAL EDEMA: Status: RESOLVED | Noted: 2022-04-01 | Resolved: 2025-04-04

## 2025-04-04 PROBLEM — Z87.891 PERSONAL HISTORY OF NICOTINE DEPENDENCE: Chronic | Status: ACTIVE | Noted: 2022-04-07

## 2025-04-04 PROBLEM — R73.03 PREDIABETES: Chronic | Status: ACTIVE | Noted: 2024-07-11

## 2025-04-04 PROBLEM — I99.8 VASCULAR INSUFFICIENCY: Chronic | Status: ACTIVE | Noted: 2021-02-25

## 2025-04-04 PROBLEM — Z82.71 FAMILY HISTORY OF POLYCYSTIC KIDNEY: Chronic | Status: ACTIVE | Noted: 2023-08-01

## 2025-04-04 PROBLEM — F39 UNSPECIFIED MOOD (AFFECTIVE) DISORDER: Status: RESOLVED | Noted: 2024-06-06 | Resolved: 2025-04-04

## 2025-04-04 PROBLEM — B96.81 HELICOBACTER PYLORI (H. PYLORI) AS THE CAUSE OF DISEASES CLASSIFIED ELSEWHERE: Status: ACTIVE | Noted: 2025-04-04

## 2025-04-04 PROBLEM — R73.03 PREDIABETES: Status: ACTIVE | Noted: 2024-07-11

## 2025-04-04 PROBLEM — K62.89 RECTAL MASS: Status: RESOLVED | Noted: 2022-12-28 | Resolved: 2025-04-04

## 2025-04-04 PROBLEM — I70.0 ATHEROSCLEROSIS OF AORTA: Chronic | Status: ACTIVE | Noted: 2022-12-11

## 2025-04-04 PROBLEM — R13.14 DYSPHAGIA, PHARYNGOESOPHAGEAL PHASE: Status: RESOLVED | Noted: 2025-04-04 | Resolved: 2025-04-04

## 2025-04-04 PROBLEM — A63.0 HPV (HUMAN PAPILLOMA VIRUS) ANOGENITAL INFECTION: Chronic | Status: ACTIVE | Noted: 2023-01-17

## 2025-04-04 PROBLEM — J32.9 CHRONIC SINUSITIS: Chronic | Status: ACTIVE | Noted: 2020-09-08

## 2025-04-04 PROBLEM — Z87.891 PERSONAL HISTORY OF NICOTINE DEPENDENCE: Status: ACTIVE | Noted: 2022-04-07

## 2025-04-04 PROBLEM — K59.09 CHRONIC CONSTIPATION: Chronic | Status: ACTIVE | Noted: 2020-09-08

## 2025-04-04 LAB
ABSOLUTE EOSINOPHIL (OHS): 0.17 K/UL
ABSOLUTE MONOCYTE (OHS): 0.31 K/UL (ref 0.3–1)
ABSOLUTE NEUTROPHIL COUNT (OHS): 2.69 K/UL (ref 1.8–7.7)
ALBUMIN SERPL BCP-MCNC: 4.1 G/DL (ref 3.5–5.2)
ALP SERPL-CCNC: 73 UNIT/L (ref 40–150)
ALT SERPL W/O P-5'-P-CCNC: 7 UNIT/L (ref 10–44)
ANION GAP (OHS): 10 MMOL/L (ref 8–16)
AST SERPL-CCNC: 23 UNIT/L (ref 11–45)
BASOPHILS # BLD AUTO: 0.04 K/UL
BASOPHILS NFR BLD AUTO: 0.8 %
BILIRUB SERPL-MCNC: 0.5 MG/DL (ref 0.1–1)
BUN SERPL-MCNC: 10 MG/DL (ref 8–23)
CALCIUM SERPL-MCNC: 9.5 MG/DL (ref 8.7–10.5)
CHLORIDE SERPL-SCNC: 100 MMOL/L (ref 95–110)
CO2 SERPL-SCNC: 27 MMOL/L (ref 23–29)
CREAT SERPL-MCNC: 0.6 MG/DL (ref 0.5–1.4)
ERYTHROCYTE [DISTWIDTH] IN BLOOD BY AUTOMATED COUNT: 14.4 % (ref 11.5–14.5)
GFR SERPLBLD CREATININE-BSD FMLA CKD-EPI: >60 ML/MIN/1.73/M2
GLUCOSE SERPL-MCNC: 120 MG/DL (ref 70–110)
HCT VFR BLD AUTO: 39.2 % (ref 37–48.5)
HGB BLD-MCNC: 12.4 GM/DL (ref 12–16)
IMM GRANULOCYTES # BLD AUTO: 0.01 K/UL (ref 0–0.04)
IMM GRANULOCYTES NFR BLD AUTO: 0.2 % (ref 0–0.5)
LYMPHOCYTES # BLD AUTO: 1.98 K/UL (ref 1–4.8)
MAGNESIUM SERPL-MCNC: 1.7 MG/DL (ref 1.6–2.6)
MCH RBC QN AUTO: 27.8 PG (ref 27–31)
MCHC RBC AUTO-ENTMCNC: 31.6 G/DL (ref 32–36)
MCV RBC AUTO: 88 FL (ref 82–98)
NUCLEATED RBC (/100WBC) (OHS): 0 /100 WBC
PLATELET # BLD AUTO: 256 K/UL (ref 150–450)
PMV BLD AUTO: 11.6 FL (ref 9.2–12.9)
POTASSIUM SERPL-SCNC: 4.1 MMOL/L (ref 3.5–5.1)
PREALB SERPL-MCNC: 11 MG/DL (ref 20–43)
PROT SERPL-MCNC: 7.2 GM/DL (ref 6–8.4)
RBC # BLD AUTO: 4.46 M/UL (ref 4–5.4)
RELATIVE EOSINOPHIL (OHS): 3.3 %
RELATIVE LYMPHOCYTE (OHS): 38.1 % (ref 18–48)
RELATIVE MONOCYTE (OHS): 6 % (ref 4–15)
RELATIVE NEUTROPHIL (OHS): 51.6 % (ref 38–73)
SODIUM SERPL-SCNC: 137 MMOL/L (ref 136–145)
TSH SERPL-ACNC: 1.38 UIU/ML (ref 0.4–4)
WBC # BLD AUTO: 5.2 K/UL (ref 3.9–12.7)

## 2025-04-04 PROCEDURE — 84443 ASSAY THYROID STIM HORMONE: CPT

## 2025-04-04 PROCEDURE — 82040 ASSAY OF SERUM ALBUMIN: CPT

## 2025-04-04 PROCEDURE — 99214 OFFICE O/P EST MOD 30 MIN: CPT | Mod: PBBFAC,PN | Performed by: STUDENT IN AN ORGANIZED HEALTH CARE EDUCATION/TRAINING PROGRAM

## 2025-04-04 PROCEDURE — 85025 COMPLETE CBC W/AUTO DIFF WBC: CPT

## 2025-04-04 PROCEDURE — 99999 PR PBB SHADOW E&M-EST. PATIENT-LVL IV: CPT | Mod: PBBFAC,,, | Performed by: STUDENT IN AN ORGANIZED HEALTH CARE EDUCATION/TRAINING PROGRAM

## 2025-04-04 PROCEDURE — 84134 ASSAY OF PREALBUMIN: CPT

## 2025-04-04 PROCEDURE — 83735 ASSAY OF MAGNESIUM: CPT

## 2025-04-04 PROCEDURE — 36415 COLL VENOUS BLD VENIPUNCTURE: CPT | Mod: PN

## 2025-04-04 NOTE — PROGRESS NOTES
Plan:     Christa was seen today for establish care.    Diagnoses and all orders for this visit:    Unintentional weight loss  -     Comprehensive Metabolic Panel; Future  -     CBC Auto Differential; Future  -     Magnesium; Future  -     TSH; Future  -     Prealbumin; Future    Impaired functional mobility, balance, gait, and endurance  -     Ambulatory Referral/Consult to Physical Therapy; Future       Follow up in about 4 weeks (around 5/2/2025), or if symptoms worsen or fail to improve.    Krystle Foster MD  04/04/2025    Subjective:      Patient ID: Christa Tate is a 85 y.o. female    Chief Complaint   Patient presents with    Establish Care     HPI  85 y.o. female with a PMHx as documented below presents to clinic today for the following:    Establish care.    CONSTITUTIONAL SYMPTOMS:  She reports significant unintentional weight loss from 180 to 137 lbs over the past year. She describes poor appetite and primarily sustains nutrition through meal replacements and protein drinks. She also reports general weakness, particularly noticeable in her legs, and expresses concern about falling. She notes difficulty opening water bottles, cans, and containers, indicating decreased upper body strength.    MEDICAL HISTORY:  She has a history of anemia during childbearing years and previous cancer. Potassium was low on labs two weeks ago. Liver enzymes were slightly elevated three weeks ago but normalized on repeat testing one week later.       ___________________________________    Allergic rhinitis:  - Flonase daily PRN  - Previous Rx: Astelin, Xyzal   - Has air purifier at home     Vascular insufficiency:  - Not currently on any pharmacologic intervention  - Compression socks PRN, elevate lower extremities when able    Overactive bladder:   - Nocturia 2x nightly   - Prev Rx: Myrbetriq     HPV related skin cancer, perianal  Resected December 28, 2022  Pathology - squamous cell carcinoma in situ  Surgeon -  Dr. Hinkle -- follow-up every 3 months    Prediabetes:   - Hgb A1c 5.7% (6/2024)  - Not currently on any pharmacologic intervention    Chronic constipation:   - Miralax daily  - Hx of complications including impaction    GERD:   - Pepcid 20 mg daily   - Tums PRN     Osteopenia, vitamin-D insufficiency:  - Lumbar spine and left hip DEXA (10/2020)  - Vit D 23 (4/13/21)  - Daily vitamin D (5000 IU) and calcium supplement    ROS  Constitutional:  Negative for chills and fever.   Respiratory:  Negative for shortness of breath.    Cardiovascular:  Negative for chest pain.   Gastrointestinal:  Negative for abdominal pain, constipation, diarrhea, nausea and vomiting.     Current Outpatient Medications   Medication Instructions    cholecalciferol (vitamin D3) (VITAMIN D3) 5,000 Units, Oral, Daily    dicyclomine (BENTYL) 10 mg, Oral, 4 times daily PRN    famotidine (PEPCID) 20 mg, Oral, Daily    fluticasone propionate (FLONASE) 100 mcg, Each Nostril, Daily    GAVILYTE-G 236-22.74-6.74 -5.86 gram suspension SMARTSIG:Milliliter(s) By Mouth Once    polyethylene glycol 3350 (MIRALAX ORAL) Take by mouth.    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    Diverticulitis     Diverticulosis     Dysphagia, pharyngoesophageal phase 04/04/2025    Gastroesophageal reflux disease without esophagitis     History of Helicobacter pylori infection     History of HPV (human papilloma virus) anogenital infection 01/17/2023    History of squamous cell carcinoma in situ 01/17/2023    Osteoarthritis 04/12/2012    Osteopenia of multiple sites     Unspecified mood (affective) disorder     situational anxiety - previous Rx xanax 0.25 mg PRN (last prscribe 2023)     Past Surgical History:   Procedure Laterality Date    CATARACT EXTRACTION W/  INTRAOCULAR LENS IMPLANT Left  "09/27/2017    Dr. Nelson    COLONOSCOPY      [id number]    EXAMINATION UNDER ANESTHESIA N/A 12/28/2022    Procedure: Exam under anesthesia;  Surgeon: Dutch Hinkle MD;  Location: [facility] OR;  Service: General;  Laterality: N/A;    RECTAL BIOPSY N/A 12/28/2022    Procedure: BIOPSY, RECTUM;  Surgeon: Dutch Hinkle MD;  Location: [facility] OR;  Service: General;  Laterality: N/A;     Review of patient's allergies indicates:   Allergen Reactions    Contact metal agent Other (See Comments)    Metal staples [surgical stainless steel]      Pt states "any type of metal"    Penicillins     Levofloxacin Rash     Family History   Problem Relation Name Age of Onset    Polycystic kidney disease Father      Diverticulitis Sister      Polycystic kidney disease Sister      Polycystic kidney disease Paternal Grandfather      Glaucoma Neg Hx      Macular degeneration Neg Hx      Colon cancer Neg Hx      Crohn's disease Neg Hx      Ulcerative colitis Neg Hx       Social History[1]    Currently on File with [facility]:   Most Recent Immunizations   Administered Date(s) Administered    COVID-19, MRNA, LN-S, PF (MODERNA FULL 0.5 ML DOSE) 03/18/2021    Tdap 05/15/2019     Objective:      Vitals:    04/04/25 0959   BP: 116/78   BP Location: Right arm   Patient Position: Sitting   Pulse: 72   Resp: 18   SpO2: 97%   Weight: 62.2 kg (137 lb 2 oz)   Height: 5' 3" (1.6 m)     Body mass index is 24.29 kg/m².    Physical Exam   Constitutional:       General: No acute distress.  HENT:      Head: Normocephalic and atraumatic.   Pulmonary:      Effort: Pulmonary effort is normal. No respiratory distress.   Neurological:      General: No focal deficit present.      Mental Status: Alert and oriented to person, place, and time. Mental status is at baseline.    Assessment:       1. Unintentional weight loss    2. Impaired functional mobility, balance, gait, and endurance        Krystle Foster MD  [facility]  Office: " [phone]   Fax: [phone]  2025      Disclaimer: This note was partly generated using dictation software which may occasionally result in transcription errors.    Total time spend on encounter: 40-54 minutes. This includes face to face time and non-face to face time preparing to see the patient (eg, review of tests), obtaining and/or reviewing separately obtained history, documenting clinical information in the electronic or other health record, independently interpreting results and communicating results to the patient/family/caregiver, or care coordinator.      Visit today included increased complexity associated with the care of the episodic problem (see above) addressed and managing the longitudinal care of the patient due to the serious and/or complex managed problem(s) (see above).          [1]   Social History  Tobacco Use    Smoking status: Former     Current packs/day: 0.00     Types: Cigarettes     Quit date: 2004     Years since quittin.2     Passive exposure: Past    Smokeless tobacco: Never    Tobacco comments:     Quit    Substance Use Topics    Alcohol use: Not Currently    Drug use: Never

## 2025-04-04 NOTE — PROGRESS NOTES
""Patient was seen by primary care today. A follow-up call was made to assess additional needs. The patient stated that all needs were addressed during the visit and reported no additional concerns at this time.    "

## 2025-04-08 ENCOUNTER — RESULTS FOLLOW-UP (OUTPATIENT)
Dept: FAMILY MEDICINE | Facility: CLINIC | Age: 85
End: 2025-04-08

## 2025-04-08 ENCOUNTER — TELEPHONE (OUTPATIENT)
Dept: FAMILY MEDICINE | Facility: CLINIC | Age: 85
End: 2025-04-08
Payer: MEDICARE

## 2025-04-08 NOTE — TELEPHONE ENCOUNTER
----- Message from Alberta sent at 4/8/2025  1:24 PM CDT -----  Type: Needs Medical AdviceWho Called:  Patient Symptoms (please be specific):  How long has patient had these symptoms:  Pharmacy name and phone #:  Best Call Back Number: [phone] Information: Patient is requesting a call back from the nurse regarding lab results.  ----- Message -----  From: Amber Lroenz  Sent: 4/8/2025   1:26 PM CDT  To: [facility]ian Dalton Staff    Type: Needs Medical AdviceWho Called:  Patient Symptoms (please be specific):  How long has patient had these symptoms:  Pharmacy name and phone #:  Best Call Back Number: [phone] Information: Patient is requesting a call back from the nurse regarding labs.

## 2025-04-08 NOTE — TELEPHONE ENCOUNTER
Addressed in separate encounter.    Krystle Foster MD  [facility]  Office: [phone]   Fax: [phone]  04/08/2025  3:11 PM

## 2025-04-25 ENCOUNTER — HOSPITAL ENCOUNTER (OUTPATIENT)
Dept: RADIOLOGY | Facility: HOSPITAL | Age: 85
Discharge: HOME | End: 2025-04-25
Payer: MEDICARE

## 2025-04-25 ENCOUNTER — OFFICE VISIT (OUTPATIENT)
Dept: GASTROENTEROLOGY | Facility: CLINIC | Age: 85
End: 2025-04-25
Payer: MEDICARE

## 2025-04-25 VITALS
BODY MASS INDEX: 24.1 KG/M2 | HEIGHT: 63 IN | SYSTOLIC BLOOD PRESSURE: 113 MMHG | WEIGHT: 136 LBS | DIASTOLIC BLOOD PRESSURE: 65 MMHG | HEART RATE: 89 BPM

## 2025-04-25 DIAGNOSIS — K59.09 CHRONIC CONSTIPATION: ICD-10-CM

## 2025-04-25 DIAGNOSIS — R14.0 ABDOMINAL BLOATING: ICD-10-CM

## 2025-04-25 DIAGNOSIS — K62.89 RECTAL MASS: ICD-10-CM

## 2025-04-25 DIAGNOSIS — R10.32 LEFT LOWER QUADRANT PAIN: ICD-10-CM

## 2025-04-25 DIAGNOSIS — R10.32 LEFT LOWER QUADRANT PAIN: Primary | ICD-10-CM

## 2025-04-25 DIAGNOSIS — Z87.19 HISTORY OF DIVERTICULITIS: ICD-10-CM

## 2025-04-25 DIAGNOSIS — Z87.19 HISTORY OF GASTROESOPHAGEAL REFLUX (GERD): ICD-10-CM

## 2025-04-25 PROCEDURE — 74176 CT ABD & PELVIS W/O CONTRAST: CPT | Mod: TC,PO

## 2025-04-25 PROCEDURE — 99999 PR PBB SHADOW E&M-EST. PATIENT-LVL IV: CPT | Mod: PBBFAC,,,

## 2025-04-25 PROCEDURE — 99214 OFFICE O/P EST MOD 30 MIN: CPT | Mod: PBBFAC,PO

## 2025-04-25 PROCEDURE — 74176 CT ABD & PELVIS W/O CONTRAST: CPT | Mod: 26,,, | Performed by: RADIOLOGY

## 2025-04-25 NOTE — PROGRESS NOTES
Subjective:       Patient ID: Christa Tate is a 85 y.o. female Body mass index is 24.1 kg/m².    Chief Complaint: Abdominal Pain, Constipation, and Follow-up    Established patient of myself.  Former patient of Dr. Epps.     Patient's family member present and assisting with history.    Constipation  This is a chronic problem. The current episode started more than 1 year ago. The problem has been gradually improving since onset. Her stool frequency is 1 time per day. The patient is on a high fiber diet. She Does not exercise regularly. There has Not been adequate water intake. Associated symptoms include abdominal pain (CHIEF COMPLAINT:  Intermittent RLQ and LLQ pain/burning that worsens with palpation; history of diverticulitis; pain began yesterday, but has worsened; currently rated 6/10) and bloating. Pertinent negatives include no diarrhea (resolved - occurred after eating [employer] [place] and during diverticulitis flare), difficulty urinating, fecal incontinence, fever, hematochezia, hemorrhoids, melena, nausea, rectal pain, vomiting or weight loss (stable - decreased appetite, but has supplementing with meal replacement drinks). Associated symptoms comments: History of GERD - currently taking famotidine 20 mg p.r.n.  Hx of rectal mass removed by Dr. Hinkle - follows closely with him every 3-6 months for management. Risk factors include recent illness and immobility. She has tried laxatives, manual disimpaction, mineral oil and enemas (Currently taking Clearlax daily and eating probiotic yogurt; past treatments: manually disimpact,  saline enemia, Miralax, mineral oil and jimmie oil) for the symptoms. The treatment provided moderate relief. Her past medical history is significant for psychiatric history (hx of mood disorder). There is no history of abdominal surgery, endocrine disease, inflammatory bowel disease, irritable bowel syndrome, metabolic disease, neurologic disease, neuromuscular disease or  radiation treatment.     Review of Systems   Constitutional:  Positive for appetite change and fatigue. Negative for activity change, chills, diaphoresis, fever, unexpected weight change and weight loss (stable - decreased appetite, but has supplementing with meal replacement drinks).   HENT:  Negative for sore throat and trouble swallowing.    Respiratory:  Negative for cough, choking and shortness of breath.    Cardiovascular:  Negative for chest pain.   Gastrointestinal:  Positive for abdominal pain (CHIEF COMPLAINT:  Intermittent RLQ and LLQ pain/burning that worsens with palpation; history of diverticulitis; pain began yesterday, but has worsened; currently rated 6/10), bloating and constipation. Negative for abdominal distention, anal bleeding, blood in stool, diarrhea (resolved - occurred after eating [employer] [place] and during diverticulitis flare), hematochezia, hemorrhoids, melena, nausea, rectal pain and vomiting.   Genitourinary:  Negative for difficulty urinating.       No LMP recorded. Patient is postmenopausal.  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    Diverticulitis     Diverticulosis     Dysphagia, pharyngoesophageal phase 04/04/2025    Gastroesophageal reflux disease without esophagitis     History of Helicobacter pylori infection     History of HPV (human papilloma virus) anogenital infection 01/17/2023    History of squamous cell carcinoma in situ 01/17/2023    Osteoarthritis 04/12/2012    Osteopenia of multiple sites     Unspecified mood (affective) disorder     situational anxiety - previous Rx xanax 0.25 mg PRN (last prscribe 2023)     Past Surgical History:   Procedure Laterality Date    CATARACT EXTRACTION W/  INTRAOCULAR LENS IMPLANT Left 09/27/2017    Dr. Nelson    COLONOSCOPY      [id number]    EXAMINATION UNDER ANESTHESIA N/A 12/28/2022    Procedure: Exam under anesthesia;  Surgeon: Dutch BARRETT  MD Manan;  Location: [facility] OR;  Service: General;  Laterality: N/A;    RECTAL BIOPSY N/A 12/28/2022    Procedure: BIOPSY, RECTUM;  Surgeon: Dutch Hinkle MD;  Location: [facility] OR;  Service: General;  Laterality: N/A;     Family History   Problem Relation Name Age of Onset    Polycystic kidney disease Father      Diverticulitis Sister      Polycystic kidney disease Sister      Polycystic kidney disease Paternal Grandfather      Glaucoma Neg Hx      Macular degeneration Neg Hx      Colon cancer Neg Hx      Crohn's disease Neg Hx      Ulcerative colitis Neg Hx       Social History[1]  Wt Readings from Last 10 Encounters:   04/25/25 61.7 kg (136 lb 0.4 oz)   04/04/25 62.2 kg (137 lb 2 oz)   03/27/25 62 kg (136 lb 11 oz)   03/25/25 62.1 kg (136 lb 14.5 oz)   03/23/25 63.2 kg (139 lb 5.3 oz)   03/18/25 64.1 kg (141 lb 5 oz)   03/07/25 65.6 kg (144 lb 10 oz)   11/12/24 66.2 kg (146 lb)   08/22/24 66.5 kg (146 lb 9.7 oz)   07/11/24 67.8 kg (149 lb 9.3 oz)     Lab Results   Component Value Date    WBC 5.20 04/04/2025    HGB 12.4 04/04/2025    HCT 39.2 04/04/2025    MCV 88 04/04/2025     04/04/2025     CMP  Sodium   Date Value Ref Range Status   04/04/2025 137 136 - 145 mmol/L Final   03/13/2025 141 136 - 145 mmol/L Final     Potassium   Date Value Ref Range Status   04/04/2025 4.1 3.5 - 5.1 mmol/L Final   03/13/2025 3.3 (L) 3.5 - 5.1 mmol/L Final     Chloride   Date Value Ref Range Status   04/04/2025 100 95 - 110 mmol/L Final   03/13/2025 102 95 - 110 mmol/L Final     CO2   Date Value Ref Range Status   04/04/2025 27 23 - 29 mmol/L Final   03/13/2025 30 (H) 23 - 29 mmol/L Final     Glucose   Date Value Ref Range Status   03/13/2025 105 70 - 110 mg/dL Final     BUN   Date Value Ref Range Status   04/04/2025 10 8 - 23 mg/dL Final     Creatinine   Date Value Ref Range Status   04/04/2025 0.6 0.5 - 1.4 mg/dL Final     Calcium   Date Value Ref Range Status   04/04/2025 9.5 8.7 - 10.5 mg/dL Final   03/13/2025 9.0 8.7 -  10.5 mg/dL Final     Total Protein   Date Value Ref Range Status   03/13/2025 6.6 6.0 - 8.4 g/dL Final     Albumin   Date Value Ref Range Status   04/04/2025 4.1 3.5 - 5.2 g/dL Final   03/13/2025 3.6 3.5 - 5.2 g/dL Final     Total Bilirubin   Date Value Ref Range Status   03/13/2025 0.5 0.1 - 1.0 mg/dL Final     Comment:     For infants and newborns, interpretation of results should be based  on gestational age, weight and in agreement with clinical  observations.    Premature Infant recommended reference ranges:  Up to 24 hours.............<8.0 mg/dL  Up to 48 hours............<12.0 mg/dL  3-5 days..................<15.0 mg/dL  6-29 days.................<15.0 mg/dL       Bilirubin Total   Date Value Ref Range Status   04/04/2025 0.5 0.1 - 1.0 mg/dL Final     Comment:     For infants and newborns, interpretation of results should be based   on gestational age, weight and in agreement with clinical   observations.    Premature Infant recommended reference ranges:   0-24 hours:  <8.0 mg/dL   24-48 hours: <12.0 mg/dL   3-5 days:    <15.0 mg/dL   6-29 days:   <15.0 mg/dL     Alkaline Phosphatase   Date Value Ref Range Status   03/13/2025 67 40 - 150 U/L Final     ALP   Date Value Ref Range Status   04/04/2025 73 40 - 150 unit/L Final     AST   Date Value Ref Range Status   04/04/2025 23 11 - 45 unit/L Final   03/13/2025 54 (H) 10 - 40 U/L Final     ALT   Date Value Ref Range Status   04/04/2025 7 (L) 10 - 44 unit/L Final   03/13/2025 28 10 - 44 U/L Final     Anion Gap   Date Value Ref Range Status   04/04/2025 10 8 - 16 mmol/L Final     eGFR if    Date Value Ref Range Status   01/14/2022 >60.0 >60 mL/min/1.73 m^2 Final     eGFR if non    Date Value Ref Range Status   01/14/2022 >60.0 >60 mL/min/1.73 m^2 Final     Comment:     Calculation used to obtain the estimated glomerular filtration  rate (eGFR) is the CKD-EPI equation.        Lab Results   Component Value Date    LIPASE 26  03/05/2025     Lab Results   Component Value Date    TSH 1.378 04/04/2025     Reviewed prior medical records including radiology report of CT abdomen and pelvis 03/05/2025, chest x-ray 03/05/2025 & endoscopy history (see surgical history).    Objective:      Physical Exam  Vitals and nursing note reviewed.   Constitutional:       General: She is not in acute distress.     Appearance: Normal appearance. She is normal weight. She is not ill-appearing.   HENT:      Mouth/Throat:      Lips: Pink. No lesions.   Cardiovascular:      Pulses: Normal pulses.      Heart sounds: Normal heart sounds.   Pulmonary:      Effort: Pulmonary effort is normal. No respiratory distress.      Breath sounds: Normal breath sounds.   Abdominal:      General: Bowel sounds are normal. There is no distension or abdominal bruit. There are no signs of injury.      Palpations: Abdomen is soft. There is no shifting dullness, fluid wave, hepatomegaly, splenomegaly or mass.      Tenderness: There is abdominal tenderness in the left lower quadrant. There is no guarding or rebound. Negative signs include Atkinson's sign, Rovsing's sign and McBurney's sign.   Skin:     General: Skin is warm and dry.      Coloration: Skin is not jaundiced or pale.   Neurological:      Mental Status: She is alert and oriented to person, place, and time.   Psychiatric:         Attention and Perception: Attention normal.         Mood and Affect: Mood normal.         Speech: Speech normal.         Behavior: Behavior normal.         Assessment:       1. Left lower quadrant pain    2. History of diverticulitis    3. Chronic constipation    4. Abdominal bloating    5. History of gastroesophageal reflux (GERD)    6. Rectal mass          Plan:       Left lower quadrant pain & History of diverticulitis  - continue with scheduled colonoscopy  -     CT Abdomen Pelvis With IV Contrast Routine Oral Contrast; Future; Expected date: 04/25/2025  -     Creatinine, serum; Future; Expected  date: 04/25/2025  - if pain worsens go to the ER    Chronic constipation  - continue with scheduled colonoscopy  -  daily exercise as tolerated, adequate water intake (six 8-oz glasses of water daily), and high fiber diet. OTC fiber supplements are recommended if diet does not reach daily fiber goal (20-30 grams daily), such as Metamucil, Citrucel, or FiberCon (take as directed, separate from other oral medications by >2 hours).  - Continue OTC MiraLax/Clearlax once daily (17g PO)  - Try intermittently dosed Dulcolax OTC as directed (every 3-4  days) PRN to facilitate bowel movements  -If still no improvement with these measures, call/follow-up    Abdominal bloating  - recommend OTC simethicone as directed, such as Phazyme or Gas-x  - recommend low gas diet: Reduce or eliminate these foods from your diet: Broccoli, Cauliflower, [place] sprouts, Cabbage, Cooked dried beans, Carbonated beverages (sparkling water, soda, beer, champagne)  Other Causes Of Excess Gas Include:   1) EATING TOO FAST or TALKING WHILE YOU CHEW may cause you to swallow air. This increases the amount of gas in the stomach and may worsen your symptoms.  --> Chew each mouthful completely before swallowing. Take your time.  2) OVEREATING may increase the feeling of being bloated and cause more gas.  --> When you are full, stop eating.  3) CONSTIPATION can increase the amount of normal intestinal gas.  --> Avoid constipation by increasing the amount of fiber in your diet by including whole cereal grains, fresh vegetables (except those in the above list) and fresh fruits. High-fiber foods absorb water and carry it out of the body. When increasing the amount of fiber in your diet, you also need to increase the amount of water that you drink. You should drink at least eight 8-ounce glasses of water (two quarts) per day.    History of GERD  -Avoid large meals, avoid eating within 2-3 hours of bedtime (avoid late night eating & lying down soon after  eating), elevate head of bed if nocturnal symptoms are present, smoking cessation (if current smoker), & weight loss (if overweight).   -Avoid known foods which trigger reflux symptoms & to minimize/avoid high-fat foods, chocolate, caffeine, citrus, alcohol, & tomato products.  -Avoid/limit use of NSAID's, since they can cause GI upset, bleeding, and/or ulcers. If needed, take with food.  - continue famotidine 20 mg p.r.n. as prescribed    Rectal mass  -follow-up with Dr. Hinkle ([facility]) for continued evaluation and management  - continue with scheduled colonoscopy    Follow up in about 2 months (around 2025), or if symptoms worsen or fail to improve.      If no improvement in symptoms or symptoms worsen, call/follow-up at clinic or go to ER.        Total time spent on the encounter includes face to face time and non-face to face time preparing to see the patient (eg, review of tests), Obtaining and/or reviewing separately obtained history, Documenting clinical information in the electronic or other health record, Independently interpreting results (not separately reported) and communicating results to the patient/family/caregiver, or Care coordination (not separately reported).     A dictation software program was used for this note. Please expect some simple typographical  errors in this note.             [1]   Social History  Tobacco Use    Smoking status: Former     Current packs/day: 0.00     Types: Cigarettes     Quit date: 2004     Years since quittin.3     Passive exposure: Past    Smokeless tobacco: Never    Tobacco comments:     Quit    Substance Use Topics    Alcohol use: Not Currently    Drug use: Never

## 2025-04-25 NOTE — PATIENT INSTRUCTIONS
Recommend high fiber diet (20-30 grams of fiber daily)/OTC fiber supplements daily as directed.  - trial of Ibgard OTC as directed on package  - continue OTC MiraLax once daily (17g PO) as directed  - If no improvement with above recommendations, try intermittently dosed Dulcolax OTC as directed (every 3-4 days) PRN to facilitate bowel movements

## 2025-04-28 ENCOUNTER — TELEPHONE (OUTPATIENT)
Dept: GASTROENTEROLOGY | Facility: CLINIC | Age: 85
End: 2025-04-28
Payer: MEDICARE

## 2025-04-28 NOTE — TELEPHONE ENCOUNTER
Returned pt call, she was informed Larissa has left for the day but shall return tomorrow... as soon as Larissa, NP reviews her CT results and send a result note the pt will be notified. Pt voiced understanding with no further questions or concerns at this time

## 2025-04-28 NOTE — TELEPHONE ENCOUNTER
----- Message from Iwona sent at 4/28/2025  1:20 PM CDT -----  Regarding: results  Type:  Needs Medical AdviceWho Called: Mell Call Back Number: [phone] Information: pt wants a callback to go over her ct results. She wants a call after 4pm due to her having appts.   please call to discuss.

## 2025-04-29 ENCOUNTER — PATIENT MESSAGE (OUTPATIENT)
Dept: GASTROENTEROLOGY | Facility: CLINIC | Age: 85
End: 2025-04-29
Payer: MEDICARE

## 2025-04-29 ENCOUNTER — TELEPHONE (OUTPATIENT)
Dept: FAMILY MEDICINE | Facility: CLINIC | Age: 85
End: 2025-04-29
Payer: MEDICARE

## 2025-04-29 ENCOUNTER — RESULTS FOLLOW-UP (OUTPATIENT)
Dept: GASTROENTEROLOGY | Facility: CLINIC | Age: 85
End: 2025-04-29

## 2025-04-29 DIAGNOSIS — K57.92 DIVERTICULITIS: Primary | ICD-10-CM

## 2025-04-29 RX ORDER — METRONIDAZOLE 500 MG/1
500 TABLET ORAL 3 TIMES DAILY
Qty: 30 TABLET | Refills: 0 | Status: SHIPPED | OUTPATIENT
Start: 2025-04-29 | End: 2025-05-09

## 2025-04-29 RX ORDER — CIPROFLOXACIN 500 MG/1
500 TABLET ORAL 2 TIMES DAILY
Qty: 20 TABLET | Refills: 0 | Status: SHIPPED | OUTPATIENT
Start: 2025-04-29 | End: 2025-05-09

## 2025-04-29 NOTE — TELEPHONE ENCOUNTER
----- Message from Reid sent at 4/29/2025 10:59 AM CDT -----  Type:  Needs Medical AdviceWho Called: ptWould the patient rather a call back or a response via [facility]? Call Carlos Call Back Number: [phone] Additional Information: pt st she is being put on some strong med. & she is unable to handle it. She have some concerns she would like to discuss with the doctor. Could someone give pt a call when they can. please call to discuss

## 2025-04-29 NOTE — TELEPHONE ENCOUNTER
Patient was called yesterday 04/28/25 to discuss CT results at the end of the day. Spoke with patient, but patient reported she was with friends and would like me to call her today 04/29/25 in the morning when it was more convenient. No further issues were noted.  Sincerely,  Larissa Floyd NP

## 2025-04-29 NOTE — TELEPHONE ENCOUNTER
Spoke to patient she does not want to take the medications . She would go to the ER. Advised pt that that is her choice. She said she will go to [facility] tomorrow

## 2025-04-29 NOTE — PROGRESS NOTES
Left message for pt to return call to clinic to reschedule c-scope per LITTLE Floyd NP. Number provided.

## 2025-04-30 ENCOUNTER — TELEPHONE (OUTPATIENT)
Dept: GASTROENTEROLOGY | Facility: CLINIC | Age: 85
End: 2025-04-30
Payer: MEDICARE

## 2025-04-30 NOTE — TELEPHONE ENCOUNTER
Notified pt daughter, Dayana of KEE Dias recommendations.  Dayana voiced understanding, all questions addressed

## 2025-04-30 NOTE — TELEPHONE ENCOUNTER
Please let the patient know unfortunately she would have to go to ER to receive IV antibiotics. I recommend her try a probiotic while taking oral antibiotics to help prevent possible side effects.   Sincerely,  Larissa Floyd NP

## 2025-04-30 NOTE — TELEPHONE ENCOUNTER
Called numbers provided, no answer, left detailed vm with callback # and asking them to check their mychart for updates as well

## 2025-04-30 NOTE — TELEPHONE ENCOUNTER
----- Message from Beck sent at 4/30/2025 12:20 PM CDT -----  Type:  Needs Medical AdviceWho Called: pt daughter Dayana Symptoms (please be specific): pt isn't getting any better with ciprofloxacin HCl (CIPRO) 500 MG tablet , and pt would like to discuss an alternative , pt can't tolerate medicine , pt has diarrhea How long has patient had these symptoms:  a few days Would the patient rather a call back or a response via [facility]? Call back Best Call Back Number: [phone] pt , [phone] pt daughter Additional Information:  please call to advise, Thank You.

## 2025-04-30 NOTE — TELEPHONE ENCOUNTER
Pt daughter Dayana called back, stating pt has severe diarrhea and stomach hurting, Dayana states this is frustrating. Asked if pt started probiotic but Dayana says she does not know, she'll have to call and ask her. Gave results from result note and what KEE Dias medically advised in regards to pt question about IV abx. Dayana started to yell through the phone, I asked if she could calm down and stop yelling since I couldn't understand what she was saying. Dayana said the cipro had the pts stomach tore up all yesterday and she didn't know who to call. Asked Dayana to have pt hold cipro but start a probiotic NOW. I will fwd this msg to KEE Dias to see what she advises. Recommended pt go to nearest ER for worsening symptoms... Dayana voiced understanding with no further questions at this time

## 2025-05-02 ENCOUNTER — TELEPHONE (OUTPATIENT)
Dept: GASTROENTEROLOGY | Facility: CLINIC | Age: 85
End: 2025-05-02
Payer: MEDICARE

## 2025-05-02 NOTE — TELEPHONE ENCOUNTER
Called pt and she says she suppose to be on a non-fiber diet but thought it was suppose to be a high fiber. She think now, she is suppose to be on non fiber diet. Read KEE Dias recommendations from pt last ov 4/25.   Pt says she is not currently have diarrhea but on low fiber diet at this time. Pt states she never started the Cipro, she was only taking the flagyl and probiotic. Informed her that her daughter Dayana called/mychart us stating the pt had severe diarrhea following the Cipro use in return KEE Dias advised pt to hold Cipro and just take the flagyl with the probiotic. Pt says her stool was loose but no diarrhea. She's better but she is having abd pain. Requesting appt with KEE Dias    Appt scheduled, pt agreed to date/time/location

## 2025-05-02 NOTE — TELEPHONE ENCOUNTER
----- Message from Sherry sent at 5/1/2025  4:16 PM CDT -----  Contact: pt .[phone]  Type: Needs Medical AdviceWho Called:  Pt Best Call Back Number: .[phone] Information: Pt stated she thinks her daughter may have misunderstood what was told to her. Pt would like providers office to call her. Pt stated she never took Cipro. She has only taken metronidagole and align probiotic. Pls call back and advise. Pt wants to make sure she is takign her meds correctly.

## 2025-05-05 ENCOUNTER — HOSPITAL ENCOUNTER (OUTPATIENT)
Dept: RADIOLOGY | Facility: HOSPITAL | Age: 85
Discharge: HOME | End: 2025-05-05
Payer: MEDICARE

## 2025-05-05 ENCOUNTER — OFFICE VISIT (OUTPATIENT)
Dept: GASTROENTEROLOGY | Facility: CLINIC | Age: 85
End: 2025-05-05
Payer: MEDICARE

## 2025-05-05 VITALS — WEIGHT: 132.94 LBS | BODY MASS INDEX: 23.55 KG/M2 | HEIGHT: 63 IN

## 2025-05-05 DIAGNOSIS — R10.31 RLQ ABDOMINAL PAIN: ICD-10-CM

## 2025-05-05 DIAGNOSIS — Z87.19 HISTORY OF DIVERTICULITIS: ICD-10-CM

## 2025-05-05 DIAGNOSIS — K62.89 RECTAL MASS: ICD-10-CM

## 2025-05-05 DIAGNOSIS — R19.8 ALTERNATING CONSTIPATION AND DIARRHEA: Primary | ICD-10-CM

## 2025-05-05 DIAGNOSIS — Z87.19 HISTORY OF GASTROESOPHAGEAL REFLUX (GERD): ICD-10-CM

## 2025-05-05 DIAGNOSIS — R19.8 ALTERNATING CONSTIPATION AND DIARRHEA: ICD-10-CM

## 2025-05-05 DIAGNOSIS — R63.4 WEIGHT LOSS: ICD-10-CM

## 2025-05-05 DIAGNOSIS — R19.4 CHANGE IN BOWEL HABITS: ICD-10-CM

## 2025-05-05 DIAGNOSIS — R19.5 CHANGE IN STOOL CALIBER: ICD-10-CM

## 2025-05-05 DIAGNOSIS — R14.0 ABDOMINAL BLOATING: ICD-10-CM

## 2025-05-05 PROCEDURE — 99214 OFFICE O/P EST MOD 30 MIN: CPT | Mod: PBBFAC,25,PO

## 2025-05-05 PROCEDURE — 74018 RADEX ABDOMEN 1 VIEW: CPT | Mod: 26,,, | Performed by: RADIOLOGY

## 2025-05-05 PROCEDURE — 74018 RADEX ABDOMEN 1 VIEW: CPT | Mod: TC,PO

## 2025-05-05 PROCEDURE — 99999 PR PBB SHADOW E&M-EST. PATIENT-LVL IV: CPT | Mod: PBBFAC,,,

## 2025-05-05 PROCEDURE — 99214 OFFICE O/P EST MOD 30 MIN: CPT | Mod: S$PBB,,,

## 2025-05-05 NOTE — PATIENT INSTRUCTIONS
- recommend OTC probiotic, such as Florastor or Culturelle, taken as directed on packaging  - avoid lactose, alcohol, & caffeine  - avoid known triggers

## 2025-05-05 NOTE — PROGRESS NOTES
"Subjective:       Patient ID: Christa Tate is a 85 y.o. female Body mass index is 23.55 kg/m².    Chief Complaint: Bloated and Follow-up    Established patient of myself.  Former patient of Dr. Epps.     Patient following up to discuss antibiotics and continued GI symptoms.    GI Problem  The primary symptoms include weight loss (Decreated appetite; documented weight loss of 12 lb since 03/07/2025), fatigue, abdominal pain and diarrhea. Primary symptoms do not include fever, nausea, vomiting, melena, hematemesis, jaundice, hematochezia or dysuria.   The abdominal pain began more than 2 days ago. The abdominal pain has been gradually improving since its onset. The abdominal pain is located in the RLQ and periumbilical region. The abdominal pain does not radiate. The severity of the abdominal pain is 3/10 (Intermittent burning that worsened after sitting a long period playing cards; came on all of sudden; varies in severity and duration). The abdominal pain is relieved by passing flatus and bowel movements.   The illness is also significant for bloating (H pylori stool negative 03/19/2025; TTG IgA in process) and constipation (having 1 BM daily - recently been pencil thin, but currently 7 on Dixon scale; unsure if she feels complete after BMs; taking MiraLax 1-2 times daily & eating probiotic yogurt; past tx: Manually disimpact, enema, mineral oil, and castor oil). The illness does not include chills, dysphagia or odynophagia. Significant associated medical issues include GERD (Well-controlled taking Pepcid 20 mg once daily) and diverticulitis (History of diverticulitis flare around ER visit 03/05/2025 treated with Cipro & Flagyl; currently taking Flagyl for possible recurrence of diverticulitis seen on repeat CT 04/25/25 - could not take Cipro because it "tore her stomach up"). Associated medical issues do not include inflammatory bowel disease, alcohol abuse, PUD, gastric bypass, bowel resection or " irritable bowel syndrome.     Review of Systems   Constitutional:  Positive for appetite change, fatigue and weight loss (Decreated appetite; documented weight loss of 12 lb since 03/07/2025). Negative for activity change, chills, diaphoresis, fever and unexpected weight change.   HENT:  Negative for sore throat and trouble swallowing.    Respiratory:  Negative for cough, choking and shortness of breath.    Cardiovascular:  Negative for chest pain.   Gastrointestinal:  Positive for abdominal pain, bloating (H pylori stool negative 03/19/2025; TTG IgA in process), constipation (having 1 BM daily - recently been pencil thin, but currently 7 on Payette scale; unsure if she feels complete after BMs; taking MiraLax 1-2 times daily & eating probiotic yogurt; past tx: Manually disimpact, enema, mineral oil, and castor oil) and diarrhea. Negative for abdominal distention, anal bleeding, blood in stool, dysphagia, hematemesis, hematochezia, jaundice, melena, nausea, rectal pain and vomiting.   Genitourinary:  Negative for difficulty urinating and dysuria.       No LMP recorded. Patient is postmenopausal.  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    Diverticulitis     Diverticulosis     Dysphagia, pharyngoesophageal phase 04/04/2025    Gastroesophageal reflux disease without esophagitis     History of Helicobacter pylori infection     History of HPV (human papilloma virus) anogenital infection 01/17/2023    History of squamous cell carcinoma in situ 01/17/2023    Osteoarthritis 04/12/2012    Osteopenia of multiple sites     Unspecified mood (affective) disorder     situational anxiety - previous Rx xanax 0.25 mg PRN (last prscribe 2023)     Past Surgical History:   Procedure Laterality Date    CATARACT EXTRACTION W/  INTRAOCULAR LENS IMPLANT Left 09/27/2017    Dr. Nelson    COLONOSCOPY      [id number]    EXAMINATION UNDER ANESTHESIA  N/A 12/28/2022    Procedure: Exam under anesthesia;  Surgeon: Dutch Hinkle MD;  Location: [facility] OR;  Service: General;  Laterality: N/A;    RECTAL BIOPSY N/A 12/28/2022    Procedure: BIOPSY, RECTUM;  Surgeon: Dutch Hinkle MD;  Location: [facility] OR;  Service: General;  Laterality: N/A;     Family History   Problem Relation Name Age of Onset    Polycystic kidney disease Father      Diverticulitis Sister      Polycystic kidney disease Sister      Polycystic kidney disease Paternal Grandfather      Glaucoma Neg Hx      Macular degeneration Neg Hx      Colon cancer Neg Hx      Crohn's disease Neg Hx      Ulcerative colitis Neg Hx       Social History[1]  Wt Readings from Last 10 Encounters:   05/05/25 60.3 kg (132 lb 15 oz)   04/25/25 61.7 kg (136 lb 0.4 oz)   04/04/25 62.2 kg (137 lb 2 oz)   03/27/25 62 kg (136 lb 11 oz)   03/25/25 62.1 kg (136 lb 14.5 oz)   03/23/25 63.2 kg (139 lb 5.3 oz)   03/18/25 64.1 kg (141 lb 5 oz)   03/07/25 65.6 kg (144 lb 10 oz)   11/12/24 66.2 kg (146 lb)   08/22/24 66.5 kg (146 lb 9.7 oz)     Lab Results   Component Value Date    WBC 5.20 04/04/2025    HGB 12.4 04/04/2025    HCT 39.2 04/04/2025    MCV 88 04/04/2025     04/04/2025     CMP  Sodium   Date Value Ref Range Status   04/04/2025 137 136 - 145 mmol/L Final   03/13/2025 141 136 - 145 mmol/L Final     Potassium   Date Value Ref Range Status   04/04/2025 4.1 3.5 - 5.1 mmol/L Final   03/13/2025 3.3 (L) 3.5 - 5.1 mmol/L Final     Chloride   Date Value Ref Range Status   04/04/2025 100 95 - 110 mmol/L Final   03/13/2025 102 95 - 110 mmol/L Final     CO2   Date Value Ref Range Status   04/04/2025 27 23 - 29 mmol/L Final   03/13/2025 30 (H) 23 - 29 mmol/L Final     Glucose   Date Value Ref Range Status   04/04/2025 120 (H) 70 - 110 mg/dL Final   03/13/2025 105 70 - 110 mg/dL Final     BUN   Date Value Ref Range Status   04/04/2025 10 8 - 23 mg/dL Final     Creatinine   Date Value Ref Range Status   05/05/2025 0.7 0.5 - 1.4 mg/dL  Final     Calcium   Date Value Ref Range Status   04/04/2025 9.5 8.7 - 10.5 mg/dL Final   03/13/2025 9.0 8.7 - 10.5 mg/dL Final     Protein Total   Date Value Ref Range Status   04/04/2025 7.2 6.0 - 8.4 gm/dL Final     Total Protein   Date Value Ref Range Status   03/13/2025 6.6 6.0 - 8.4 g/dL Final     Albumin   Date Value Ref Range Status   04/04/2025 4.1 3.5 - 5.2 g/dL Final   03/13/2025 3.6 3.5 - 5.2 g/dL Final     Total Bilirubin   Date Value Ref Range Status   03/13/2025 0.5 0.1 - 1.0 mg/dL Final     Comment:     For infants and newborns, interpretation of results should be based  on gestational age, weight and in agreement with clinical  observations.    Premature Infant recommended reference ranges:  Up to 24 hours.............<8.0 mg/dL  Up to 48 hours............<12.0 mg/dL  3-5 days..................<15.0 mg/dL  6-29 days.................<15.0 mg/dL       Bilirubin Total   Date Value Ref Range Status   04/04/2025 0.5 0.1 - 1.0 mg/dL Final     Comment:     For infants and newborns, interpretation of results should be based   on gestational age, weight and in agreement with clinical   observations.    Premature Infant recommended reference ranges:   0-24 hours:  <8.0 mg/dL   24-48 hours: <12.0 mg/dL   3-5 days:    <15.0 mg/dL   6-29 days:   <15.0 mg/dL     Alkaline Phosphatase   Date Value Ref Range Status   03/13/2025 67 40 - 150 U/L Final     ALP   Date Value Ref Range Status   04/04/2025 73 40 - 150 unit/L Final     AST   Date Value Ref Range Status   04/04/2025 23 11 - 45 unit/L Final   03/13/2025 54 (H) 10 - 40 U/L Final     ALT   Date Value Ref Range Status   04/04/2025 7 (L) 10 - 44 unit/L Final   03/13/2025 28 10 - 44 U/L Final     Anion Gap   Date Value Ref Range Status   04/04/2025 10 8 - 16 mmol/L Final     eGFR if    Date Value Ref Range Status   01/14/2022 >60.0 >60 mL/min/1.73 m^2 Final     eGFR if non    Date Value Ref Range Status   01/14/2022 >60.0 >60  mL/min/1.73 m^2 Final     Comment:     Calculation used to obtain the estimated glomerular filtration  rate (eGFR) is the CKD-EPI equation.        Lab Results   Component Value Date    LIPASE 26 03/05/2025     Lab Results   Component Value Date    TSH 1.378 04/04/2025     Reviewed prior medical records including radiology report of [facility] 05/05/2025, CT abdomen and pelvis 04/25/2025, CT abdomen and pelvis 03/05/2025, chest x-ray 03/05/2025 & endoscopy history (see surgical history).    Objective:      Physical Exam  Vitals and nursing note reviewed.   Constitutional:       General: She is not in acute distress.     Appearance: Normal appearance. She is normal weight. She is not ill-appearing.   HENT:      Mouth/Throat:      Lips: Pink. No lesions.   Cardiovascular:      Pulses: Normal pulses.      Heart sounds: Normal heart sounds.   Pulmonary:      Effort: Pulmonary effort is normal. No respiratory distress.      Breath sounds: Normal breath sounds.   Abdominal:      General: Bowel sounds are normal. There is distension. There is no abdominal bruit. There are no signs of injury.      Palpations: Abdomen is soft. There is no shifting dullness, fluid wave, hepatomegaly, splenomegaly or mass.      Tenderness: There is abdominal tenderness (mild) in the right lower quadrant and periumbilical area. There is no guarding or rebound. Negative signs include Atkinson's sign, Rovsing's sign and McBurney's sign.   Skin:     General: Skin is warm and dry.      Coloration: Skin is not jaundiced or pale.   Neurological:      Mental Status: She is alert and oriented to person, place, and time.   Psychiatric:         Attention and Perception: Attention normal.         Mood and Affect: Mood normal. Affect is tearful.         Speech: Speech normal.         Behavior: Behavior normal.         Assessment:       1. Alternating constipation and diarrhea    2. Change in bowel habits    3. Change in stool caliber    4. History of diverticulitis     5. RLQ abdominal pain    6. Abdominal bloating    7. Weight loss    8. History of gastroesophageal reflux (GERD)    9. Rectal mass          Plan:       Alternating constipation and diarrhea, Change in bowel habits, & Change in stool caliber  - continue with scheduled colonoscopy  - recommend OTC probiotic, such as Florastor or Culturelle, taken as directed on packaging  - avoid lactose, alcohol, & caffeine  - avoid known triggers  - start Linzess  - may hold Miralax after starting Linzess  -     Giardia / Cryptosporidum, EIA; Future  -     Stool Exam-Ova,Cysts,Parasites; Future  -     Clostridium difficile EIA; Future  -     Stool culture; Future  -     X-Ray Abdomen AP 1 View; Future; Expected date: 05/05/2025  -     TISSUE TRANSGLUTAMINASE (TTG), IGA; Future; Expected date: 05/05/2025  -     IGA; Future; Expected date: 05/05/2025  - if symptoms worsen go to ER    History of diverticulitis & RLQ abdominal pain  - continue with scheduled colonoscopy  - discussed the diagnosis of diverticulosis and diverticulitis, advised to continue a low fiber diet for the next 4 weeks and then slowly increase fiber in diet. Advised a low fiber diet is recommended for diverticulitis (for about 4 weeks), but to prevent diverticulitis, high fiber diet is recommended.  -Recommended high fiber diet after episode has completely resolved. Recommended daily exercise, adequate water intake (six 8-oz glasses of water daily), and high fiber diet. OTC fiber supplements are recommended if diet does not reach daily fiber goal (25 grams daily), such as Metamucil, Citrucel, or FiberCon (take as directed, separate from other oral medications by >2 hours).  - if symptoms do not resolve or if worsen prior to colonoscopy to contact us or go to ER, patient verbalized understanding  - if episodes of diverticulitis recur frequently, may need to consult general surgery  - continue antibiotic until completed    Abdominal bloating  - continue with  scheduled colonoscopy  - recommend OTC simethicone as directed, such as Phazyme or Gas-x  - recommend low gas diet: Reduce or eliminate these foods from your diet: Broccoli, Cauliflower, [place] sprouts, Cabbage, Cooked dried beans, Carbonated beverages (sparkling water, soda, beer, champagne)  Other Causes Of Excess Gas Include:   1) EATING TOO FAST or TALKING WHILE YOU CHEW may cause you to swallow air. This increases the amount of gas in the stomach and may worsen your symptoms.  --> Chew each mouthful completely before swallowing. Take your time.  2) OVEREATING may increase the feeling of being bloated and cause more gas.  --> When you are full, stop eating.  3) CONSTIPATION can increase the amount of normal intestinal gas.  --> Avoid constipation by increasing the amount of fiber in your diet by including whole cereal grains, fresh vegetables (except those in the above list) and fresh fruits. High-fiber foods absorb water and carry it out of the body. When increasing the amount of fiber in your diet, you also need to increase the amount of water that you drink. You should drink at least eight 8-ounce glasses of water (two quarts) per day.  -     X-Ray Abdomen AP 1 View; Future; Expected date: 05/05/2025  -     TISSUE TRANSGLUTAMINASE (TTG), IGA; Future; Expected date: 05/05/2025  -     IGA; Future; Expected date: 05/05/2025    Weight loss  - continue with scheduled colonoscopy  - encouraged PO intake and daily calorie counts to ensure adequate nutrition is taken in, recommend at least 2,000 calories a day  - recommend nutritional drinks, such as Boost, Ensure or Glucerna, to supplement nutrition needs    History of gastroesophageal reflux (GERD)  -Avoid large meals, avoid eating within 2-3 hours of bedtime (avoid late night eating & lying down soon after eating), elevate head of bed if nocturnal symptoms are present, smoking cessation (if current smoker), & weight loss (if overweight).   -Avoid known foods  which trigger reflux symptoms & to minimize/avoid high-fat foods, chocolate, caffeine, citrus, alcohol, & tomato products.  -Avoid/limit use of NSAID's, since they can cause GI upset, bleeding, and/or ulcers. If needed, take with food.  - continue famotidine 20 mg p.r.n. as prescribed  - consider EGD    Rectal mass  - follow-up with Dr. Hinkle ([facility]) as recommended for continued evaluation and management  - continue with scheduled colonoscopy    Follow up in about 2 months (around 2025), or if symptoms worsen or fail to improve.      If no improvement in symptoms or symptoms worsen, call/follow-up at clinic or go to ER.        Total time spent on the encounter includes face to face time and non-face to face time preparing to see the patient (eg, review of tests), Obtaining and/or reviewing separately obtained history, Documenting clinical information in the electronic or other health record, Independently interpreting results (not separately reported) and communicating results to the patient/family/caregiver, or Care coordination (not separately reported).     A dictation software program was used for this note. Please expect some simple typographical  errors in this note.             [1]   Social History  Tobacco Use    Smoking status: Former     Current packs/day: 0.00     Types: Cigarettes     Quit date: 2004     Years since quittin.3     Passive exposure: Past    Smokeless tobacco: Never    Tobacco comments:     Quit    Substance Use Topics    Alcohol use: Not Currently    Drug use: Never

## 2025-05-06 ENCOUNTER — RESULTS FOLLOW-UP (OUTPATIENT)
Dept: GASTROENTEROLOGY | Facility: CLINIC | Age: 85
End: 2025-05-06

## 2025-05-06 DIAGNOSIS — R76.8 HIGH TOTAL SERUM IGA: ICD-10-CM

## 2025-05-06 DIAGNOSIS — R19.8 ALTERNATING CONSTIPATION AND DIARRHEA: Primary | ICD-10-CM

## 2025-05-07 ENCOUNTER — LAB VISIT (OUTPATIENT)
Dept: LAB | Facility: HOSPITAL | Age: 85
End: 2025-05-07
Payer: MEDICARE

## 2025-05-07 DIAGNOSIS — R19.8 ALTERNATING CONSTIPATION AND DIARRHEA: ICD-10-CM

## 2025-05-07 PROCEDURE — 87046 STOOL CULTR AEROBIC BACT EA: CPT

## 2025-05-07 PROCEDURE — 87328 CRYPTOSPORIDIUM AG IA: CPT

## 2025-05-07 PROCEDURE — 87045 FECES CULTURE AEROBIC BACT: CPT

## 2025-05-07 PROCEDURE — 87177 OVA AND PARASITES SMEARS: CPT

## 2025-05-07 PROCEDURE — 87427 SHIGA-LIKE TOXIN AG IA: CPT | Mod: 59

## 2025-05-08 LAB
C COLI+JEJ+UPSA DNA STL QL NAA+NON-PROBE: NEGATIVE
CRYPTOSP AG SPEC QL: NEGATIVE
G LAMBLIA AG STL QL IA: NEGATIVE

## 2025-05-09 ENCOUNTER — TELEPHONE (OUTPATIENT)
Dept: GASTROENTEROLOGY | Facility: CLINIC | Age: 85
End: 2025-05-09
Payer: MEDICARE

## 2025-05-09 LAB
E COLI SXT1 STL QL IA: NEGATIVE
E COLI SXT2 STL QL IA: NEGATIVE

## 2025-05-09 NOTE — TELEPHONE ENCOUNTER
Pt notified and said she didn't know the message from her Dr was there. Her granddaughter showed her that... she asked if she should follow up with her PCP- as she was medically advised to do. Reassured pt she will be notified once all of her stool results KEE Dias ordered is back and reviewed; she voiced understanding with no further questions at this time.

## 2025-05-09 NOTE — TELEPHONE ENCOUNTER
Please let the patient know her primary care doctor messaged her with recommendations in regards to the abnormal blood work. She can review the PCP's recommendations on her Mychart.  Sincerely,  Larissa Floyd NP

## 2025-05-09 NOTE — TELEPHONE ENCOUNTER
----- Message from Ed sent at 5/9/2025 11:54 AM CDT -----  Regarding: Christa  Type: Patient Callback Who called: Christa What is the request in detail: Patient stated that she does not know what to eat. She has had multiple test in August and she stated that the results look bad. She is looking to get a call today from the office about this. Please reach out to the patient to explain her results as soon as possible because she is very worried. Can the clinic reply by [facility]? Yes Would the patient rather a call back or a response via [facility]? Callback Best call back number: [phone] Information:

## 2025-05-09 NOTE — TELEPHONE ENCOUNTER
----- Message from [employer] sent at 5/9/2025  9:05 AM CDT -----  Type:  Test ResultsWho Called:  ptName of Test (Lab/Mammo/Etc):  lab, stool, xrayDate of Test:  5/7Ordering Provider:  sameWhere the test was performed:  [facility] Call Back Number:  [phone] Additional Information:  rtn call w/resultsPlease adviseThanks

## 2025-05-09 NOTE — TELEPHONE ENCOUNTER
"Returned pt call and she was wanting to know why all of her labs say ABNORMAL. Read pt ABNORMAL lab results to her that KEE Dias ordered and resulted. Pt was referring to labs her PCP ordered so pt was asked to f/u with her PCP and then pt began to cry and ask "Why do I have to deal with this. I don't even know what's going on with me or what's wrong with me. I have to take care of my blind daughter and dogs... I had to put my favorite dog down. I just need help. I haven't heard from nobody else. Back then we all went to one Dr and he would take care of me and all  my children. I've only seen this PCP once." Pt continued to cry, I informed her we are still waiting for some of  her results but will notify her once we get them... pt insists on KEE Dias explaining her results to her and what is going on with her. Informed pt I typically communicate for KEE Dias but I will give her the message and f/u with pt. Pt voiced understanding with no further questions at this time  "

## 2025-05-10 LAB — BACTERIA STL CULT: NORMAL

## 2025-05-13 LAB — O+P STL MICRO: NORMAL

## 2025-05-14 ENCOUNTER — LAB VISIT (OUTPATIENT)
Dept: LAB | Facility: HOSPITAL | Age: 85
End: 2025-05-14
Attending: ALLERGY & IMMUNOLOGY
Payer: MEDICARE

## 2025-05-14 ENCOUNTER — OFFICE VISIT (OUTPATIENT)
Dept: ALLERGY | Facility: CLINIC | Age: 85
End: 2025-05-14
Payer: MEDICARE

## 2025-05-14 VITALS
HEIGHT: 63 IN | BODY MASS INDEX: 23.34 KG/M2 | OXYGEN SATURATION: 95 % | SYSTOLIC BLOOD PRESSURE: 110 MMHG | DIASTOLIC BLOOD PRESSURE: 72 MMHG | HEART RATE: 82 BPM | WEIGHT: 131.75 LBS

## 2025-05-14 DIAGNOSIS — R76.8 HIGH TOTAL SERUM IGA: ICD-10-CM

## 2025-05-14 DIAGNOSIS — J31.0 CHRONIC RHINITIS: Primary | ICD-10-CM

## 2025-05-14 DIAGNOSIS — J31.0 CHRONIC RHINITIS: ICD-10-CM

## 2025-05-14 LAB
IGA SERPL-MCNC: 391 MG/DL (ref 40–350)
IGG SERPL-MCNC: 1016 MG/DL (ref 650–1600)
IGM SERPL-MCNC: 82 MG/DL (ref 50–300)

## 2025-05-14 PROCEDURE — 83521 IG LIGHT CHAINS FREE EACH: CPT

## 2025-05-14 PROCEDURE — 36415 COLL VENOUS BLD VENIPUNCTURE: CPT | Mod: PO

## 2025-05-14 PROCEDURE — 86317 IMMUNOASSAY INFECTIOUS AGENT: CPT

## 2025-05-14 PROCEDURE — 82784 ASSAY IGA/IGD/IGG/IGM EACH: CPT

## 2025-05-14 PROCEDURE — 82787 IGG 1 2 3 OR 4 EACH: CPT | Mod: 59,PO

## 2025-05-14 PROCEDURE — 82784 ASSAY IGA/IGD/IGG/IGM EACH: CPT | Mod: 59

## 2025-05-14 PROCEDURE — 99999 PR PBB SHADOW E&M-EST. PATIENT-LVL IV: CPT | Mod: PBBFAC,,, | Performed by: ALLERGY & IMMUNOLOGY

## 2025-05-14 PROCEDURE — 99214 OFFICE O/P EST MOD 30 MIN: CPT | Mod: PBBFAC,PO | Performed by: ALLERGY & IMMUNOLOGY

## 2025-05-14 PROCEDURE — 99204 OFFICE O/P NEW MOD 45 MIN: CPT | Mod: S$PBB,,, | Performed by: ALLERGY & IMMUNOLOGY

## 2025-05-14 RX ORDER — IPRATROPIUM BROMIDE 21 UG/1
SPRAY, METERED NASAL
Qty: 30 ML | Refills: 12 | Status: SHIPPED | OUTPATIENT
Start: 2025-05-14

## 2025-05-14 NOTE — PROGRESS NOTES
Subjective:       Patient ID: Christa Tate is a 85 y.o. female.    Chief Complaint:  gluten allergy      66 yo woman presents for consult from NP Larissa Floyd for elevated IgA. She states she has been having severe GI issues. Has pain, has diarrhea, bloat and weight loss. Saw GI and had labs. She was told had gluten issue so has been eating gluten free and not any better. Celiac test was negative but showed slightly elevated IgA of 393. She does not have frequent infections, no sinus infections, no bronchitis, no pneumonia. She recently was on Cipro and metronidazole fro GI issues. Finished today she felt like stomach was worse on these. She does have rhinitis, has lot of runny nose. Not much sneeze or stuffy nose. Has used Flonase and minimal help. No asthma or eczema. No insect or latex allergy.         Environmental History: see history section for home environment  Review of Systems   HENT:  Positive for congestion, postnasal drip and rhinorrhea. Negative for sinus pressure and sneezing.    Respiratory:  Negative for cough, chest tightness, shortness of breath and wheezing.    Gastrointestinal:  Positive for abdominal distention, abdominal pain, constipation and diarrhea.   Skin:  Negative for color change and rash.        Objective:      Physical Exam  Vitals and nursing note reviewed.   Constitutional:       General: She is not in acute distress.     Appearance: Normal appearance. She is not ill-appearing.   HENT:      Nose: No rhinorrhea.   Eyes:      General:         Right eye: No discharge.         Left eye: No discharge.      Conjunctiva/sclera: Conjunctivae normal.   Pulmonary:      Effort: Pulmonary effort is normal. No respiratory distress.   Abdominal:      General: There is no distension.   Skin:     General: Skin is warm and dry.      Findings: No erythema or rash.   Neurological:      Mental Status: She is alert and oriented to person, place, and time.   Psychiatric:         Mood and  Affect: Mood normal.         Behavior: Behavior normal.         Laboratory:   none performed   Assessment:       1. Chronic rhinitis    2. High total serum IgA         Plan:       Elevated IgA- advised is just above normal range so unlikely relevant, will send labs fro immune evaluation to see if any other abnormalities but pt has no H/O chronic infections  Phone review results  Rhinitis- trial ipratropium 2 SEN BID and up to QID as needed    I spent a total of 45 minutes on the day of the visit.  This includes face to face time and non-face to face time preparing to see the patient (eg, review of tests), obtaining and/or reviewing separately obtained history, documenting clinical information in the electronic or other health record, independently interpreting results and communicating results to the patient/family/caregiver, or care coordinator.

## 2025-05-19 LAB
DIPHTHERIA TOXOID AB: <0.1 IU/ML
TETANUS ANTITOXOID: 0.62 IU/ML
W IMMUNOGLOBULIN G SUBCLASS 1: 527 MG/DL
W IMMUNOGLOBULIN G SUBCLASS 2: 266 MG/DL
W IMMUNOGLOBULIN G SUBCLASS 3: 47 MG/DL
W IMMUNOGLOBULIN G SUBCLASS 4: 79 MG/DL

## 2025-05-22 ENCOUNTER — TELEPHONE (OUTPATIENT)
Dept: SURGERY | Facility: HOSPITAL | Age: 85
End: 2025-05-22
Payer: MEDICARE

## 2025-05-22 NOTE — TELEPHONE ENCOUNTER
Pt is scheduled for colonoscopy on Weds., 5/28/2025. Pt states she does not have prep instructions - please send to pt's [employer] portal. Thank you.

## 2025-05-26 ENCOUNTER — TELEPHONE (OUTPATIENT)
Dept: GASTROENTEROLOGY | Facility: CLINIC | Age: 85
End: 2025-05-26
Payer: MEDICARE

## 2025-05-26 DIAGNOSIS — H25.11 NUCLEAR SCLEROTIC CATARACT OF RIGHT EYE: Primary | ICD-10-CM

## 2025-05-26 RX ORDER — PREDNISOLONE/MOXIFLOX/BROMFEN 1 %-0.5 %
1 SUSPENSION, DROPS(FINAL DOSAGE FORM)(ML) OPHTHALMIC (EYE) 3 TIMES DAILY
Qty: 8 ML | Refills: 3 | Status: SHIPPED | OUTPATIENT
Start: 2025-05-26

## 2025-05-26 NOTE — TELEPHONE ENCOUNTER
----- Message from Sherry sent at 5/26/2025  9:34 AM CDT -----  Contact: pt [phone]  Type: Needs Medical AdviceWho Called:  Pt Best Call Back Number: [phone] Information: Pt requesting to reschedule her COL on 05/28 due to being ill. Pls call back and advise

## 2025-05-26 NOTE — TELEPHONE ENCOUNTER
Spoke to pt, rescheduled procedure due to pt having cold. Pt verbalized understanding of new procedure date and location.

## 2025-05-29 ENCOUNTER — TELEPHONE (OUTPATIENT)
Dept: ALLERGY | Facility: CLINIC | Age: 85
End: 2025-05-29
Payer: MEDICARE

## 2025-05-29 NOTE — TELEPHONE ENCOUNTER
Spoke to pt and relayed Dr. Meng's message concerning lab results          ----- Message from Melissa Meng MD sent at 5/29/2025 11:56 AM CDT -----  She needs to discuss with GI. She came to me for elevated IgA and did immune system labs which so far are normal. I did advise her that celiac test was normal so did not have sign of celiac but she needs to discuss with GI issues with GI  ----- Message -----  From: Tato Del Angel LPN  Sent: 5/29/2025  11:05 AM CDT  To: Melissa Meng MD    Please advise of results  ----- Message -----  From: Citlalli Greco  Sent: 5/29/2025  10:53 AM CDT  To: Yusra GRIGSBY Staff    Type:  Test ResultsWho Called: pt Ordering Provider: Dr Kalani Carpioould the patient rather a call back or a response via [facility]? Yes Best Call Back Number: [phone] Information:  Pt would like to know if they are allergic to Gluten or not, she doesn't know how to eat,     Please call back to advise. Thanks!

## 2025-05-30 ENCOUNTER — OFFICE VISIT (OUTPATIENT)
Dept: FAMILY MEDICINE | Facility: CLINIC | Age: 85
End: 2025-05-30
Payer: MEDICARE

## 2025-05-30 VITALS
SYSTOLIC BLOOD PRESSURE: 128 MMHG | TEMPERATURE: 99 F | WEIGHT: 129.63 LBS | DIASTOLIC BLOOD PRESSURE: 72 MMHG | HEIGHT: 63 IN | HEART RATE: 102 BPM | BODY MASS INDEX: 22.97 KG/M2 | OXYGEN SATURATION: 96 %

## 2025-05-30 DIAGNOSIS — J01.90 ACUTE BACTERIAL SINUSITIS: Primary | ICD-10-CM

## 2025-05-30 DIAGNOSIS — R05.1 ACUTE COUGH: ICD-10-CM

## 2025-05-30 DIAGNOSIS — B96.89 ACUTE BACTERIAL SINUSITIS: Primary | ICD-10-CM

## 2025-05-30 DIAGNOSIS — K59.09 CHRONIC CONSTIPATION: ICD-10-CM

## 2025-05-30 DIAGNOSIS — R09.81 SINUS CONGESTION: ICD-10-CM

## 2025-05-30 PROCEDURE — 99999 PR PBB SHADOW E&M-EST. PATIENT-LVL V: CPT | Mod: PBBFAC,,, | Performed by: NURSE PRACTITIONER

## 2025-05-30 PROCEDURE — 99215 OFFICE O/P EST HI 40 MIN: CPT | Mod: PBBFAC,PN | Performed by: NURSE PRACTITIONER

## 2025-05-30 RX ORDER — DOXYCYCLINE 100 MG/1
100 CAPSULE ORAL 2 TIMES DAILY
Qty: 14 CAPSULE | Refills: 0 | Status: SHIPPED | OUTPATIENT
Start: 2025-05-30 | End: 2025-06-06

## 2025-05-30 NOTE — PATIENT INSTRUCTIONS
OVER THE COUNTER RECOMMENDATIONS/SUGGESTIONS.    Sinus massage    Humidifier/steam shower     Use Nasal Saline to mechanically move any post nasal drip from your eustachian tube or from the back of your throat. (do this prior to using Flonase, to help with effectiveness)    Use Flonase 1spray/nostril twice a day. It is a local acting steroid nasal spray, if you develop a bloody nose, stop using the medication immediately.     Continue Claritin     Use mucinex (guaifenisin) to break up mucous up to 2400mg/day to loosen any mucous.     Make sure to stay well hydrated.     May try honey- which is a natural cough suppressant      Tylenol up to 4,000 mg a day is safe for short periods and can be used for body aches, pain, and fever. However in high doses and prolonged use it can cause liver irritation.     Ibuprofen is a non-steroidal anti-inflammatory that can be used for body aches, pain, and fever.However it can also cause stomach irritation if over used.           Do not hesitate to get in touch with me should you have any further questions.      Thank you for trusting me with your care!  I wish you health and happiness.     JUSTIN Romo

## 2025-05-30 NOTE — PROGRESS NOTES
THIS DOCUMENT WAS MADE IN PART WITH VOICE RECOGNITION SOFTWARE.  OCCASIONALLY THIS SOFTWARE WILL MISINTERPRET WORDS OR PHRASES.     Assessment and Plan:    Acute bacterial sinusitis    Covered with doxycycline. We discussed symptomatic management with OTC meds and saline rinses. Will add Flonase and Mucinex. Discussed using saline rinse/mist prior to using Flonase to help with effectiveness.   Will take Tylenol or Ibuprofen as needed for any pain and/or fever. Advised on signs/symptoms of emergent conditions. Patient advised to let us know if symptoms persist or if she develops any new or worsening symptoms.         -     doxycycline (VIBRAMYCIN) 100 MG Cap; Take 1 capsule (100 mg total) by mouth 2 (two) times daily. for 7 days  Dispense: 14 capsule; Refill: 0    Sinus congestion    Acute cough    Chronic constipation  Comments:  Advised on diet and fiber intake  Continue regimen  Keep follow up with GI  Plans on rescheduling colonoscopy when feeling better         Follow up if symptoms worsen or fail to improve, for Follow-up with PCP.   ______________________________________________________________________  Subjective:    History of Present Illness    CHIEF COMPLAINT:  - Patient presents with symptoms of a sinus infection, including thick mucus, cough, and nausea.    HPI:  Patient reports sinus-related symptoms including thick yellowish-green saliva and mucus.  She reports persistent sinus congestion for the past week, with postnasal drip and associated cough.  She reports sinus drainage is foul and thick, causing her to gag and throw up on Tuesday.  She reports intermittent nausea, decreased appetite and body aches. Patient reports that her grandchildren had similar symptoms a couple of weeks ago.  She reports her symptoms seem to be lingering.      For symptom management, she has been taking Claritin and has Flonase, though she has not started using it yet. Her daughter purchased cough syrup for her. She  denies having a fever but felt warm a few days ago. She expresses concern about the possibility of pneumonia.    She discusses ongoing GI issues, including bloating, decreased appetite, and alternating constipation and diarrhea. She was evaluated by GI nurse practitioner on May 5th was advised to follow a low-fiber diet for 4 weeks, then gradually increase fiber intake.  She reports this has helped some.  She is no longer having diarrhea. A colonoscopy scheduled for the 12th was cancelled due to her illness. She had an elevated IgA, TTG and kidney function was acceptable.  She was referred to immunology for further investigation.      She was evaluated by allergist, Dr. Meng on May 14 and is pending lab results, so far normal.  She was started on  Atrovent nasal spray for chronic rhinitis.              Medications:  Medications Ordered Prior to Encounter[1]    Review of Systems:  Review of Systems   Constitutional:  Negative for fatigue and fever.   HENT:  Positive for congestion, postnasal drip, rhinorrhea and sinus pressure. Negative for sinus pain and sore throat.    Respiratory:  Positive for cough. Negative for shortness of breath.    Cardiovascular:  Negative for chest pain.   Gastrointestinal:  Positive for constipation, nausea and vomiting (1 episode). Negative for abdominal distention and abdominal pain.   Musculoskeletal:  Positive for arthralgias.       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    Diverticulitis     Diverticulosis     Dysphagia, pharyngoesophageal phase 04/04/2025    Gastroesophageal reflux disease without esophagitis     History of Helicobacter pylori infection     History of HPV (human papilloma virus) anogenital infection 01/17/2023    History of squamous cell carcinoma in situ 01/17/2023    Osteoarthritis 04/12/2012    Osteopenia of multiple sites     Unspecified mood  "(affective) disorder     situational anxiety - previous Rx xanax 0.25 mg PRN (last prscribe 2023)       Objective:    Vitals:  Vitals:    05/30/25 1153   BP: 128/72   Pulse: 102   Temp: 98.6 °F (37 °C)   SpO2: 96%   Weight: 58.8 kg (129 lb 10.1 oz)   Height: 5' 3" (1.6 m)       Physical Exam  Vitals and nursing note reviewed.   Constitutional:       General: She is not in acute distress.  HENT:      Head: Normocephalic and atraumatic.      Right Ear: Tympanic membrane, ear canal and external ear normal.      Left Ear: Tympanic membrane, ear canal and external ear normal.      Nose: Mucosal edema, congestion and rhinorrhea present. Rhinorrhea is purulent.      Right Turbinates: Not swollen or pale.      Left Turbinates: Not swollen or pale.      Right Sinus: No maxillary sinus tenderness or frontal sinus tenderness.      Left Sinus: No maxillary sinus tenderness or frontal sinus tenderness.      Mouth/Throat:      Mouth: Mucous membranes are moist.      Pharynx: Uvula midline. Posterior oropharyngeal erythema and postnasal drip present. No pharyngeal swelling.   Eyes:      Conjunctiva/sclera: Conjunctivae normal.   Neck:      Thyroid: No thyromegaly.   Cardiovascular:      Rate and Rhythm: Normal rate and regular rhythm.      Heart sounds: Normal heart sounds.   Pulmonary:      Effort: Pulmonary effort is normal.      Breath sounds: Normal breath sounds.   Abdominal:      General: Bowel sounds are normal. There is no distension.      Palpations: Abdomen is soft.      Tenderness: There is no abdominal tenderness.   Musculoskeletal:         General: Normal range of motion.      Cervical back: Normal range of motion and neck supple.   Lymphadenopathy:      Cervical: No cervical adenopathy.   Skin:     General: Skin is warm and dry.   Neurological:      General: No focal deficit present.      Mental Status: She is alert and oriented to person, place, and time.      Cranial Nerves: No cranial nerve deficit.   Psychiatric: "         Mood and Affect: Mood normal.         Behavior: Behavior normal.         Thought Content: Thought content normal.         Data:    CMP  Sodium   Date Value Ref Range Status   04/04/2025 137 136 - 145 mmol/L Final   03/13/2025 141 136 - 145 mmol/L Final     Potassium   Date Value Ref Range Status   04/04/2025 4.1 3.5 - 5.1 mmol/L Final   03/13/2025 3.3 (L) 3.5 - 5.1 mmol/L Final     Chloride   Date Value Ref Range Status   04/04/2025 100 95 - 110 mmol/L Final   03/13/2025 102 95 - 110 mmol/L Final     CO2   Date Value Ref Range Status   04/04/2025 27 23 - 29 mmol/L Final   03/13/2025 30 (H) 23 - 29 mmol/L Final     Glucose   Date Value Ref Range Status   04/04/2025 120 (H) 70 - 110 mg/dL Final   03/13/2025 105 70 - 110 mg/dL Final     BUN   Date Value Ref Range Status   04/04/2025 10 8 - 23 mg/dL Final     Creatinine   Date Value Ref Range Status   05/05/2025 0.7 0.5 - 1.4 mg/dL Final     Calcium   Date Value Ref Range Status   04/04/2025 9.5 8.7 - 10.5 mg/dL Final   03/13/2025 9.0 8.7 - 10.5 mg/dL Final     Protein Total   Date Value Ref Range Status   04/04/2025 7.2 6.0 - 8.4 gm/dL Final     Total Protein   Date Value Ref Range Status   03/13/2025 6.6 6.0 - 8.4 g/dL Final     Albumin   Date Value Ref Range Status   04/04/2025 4.1 3.5 - 5.2 g/dL Final   03/13/2025 3.6 3.5 - 5.2 g/dL Final     Total Bilirubin   Date Value Ref Range Status   03/13/2025 0.5 0.1 - 1.0 mg/dL Final     Comment:     For infants and newborns, interpretation of results should be based  on gestational age, weight and in agreement with clinical  observations.    Premature Infant recommended reference ranges:  Up to 24 hours.............<8.0 mg/dL  Up to 48 hours............<12.0 mg/dL  3-5 days..................<15.0 mg/dL  6-29 days.................<15.0 mg/dL       Bilirubin Total   Date Value Ref Range Status   04/04/2025 0.5 0.1 - 1.0 mg/dL Final     Comment:     For infants and newborns, interpretation of results should be based    on gestational age, weight and in agreement with clinical   observations.    Premature Infant recommended reference ranges:   0-24 hours:  <8.0 mg/dL   24-48 hours: <12.0 mg/dL   3-5 days:    <15.0 mg/dL   6-29 days:   <15.0 mg/dL     Alkaline Phosphatase   Date Value Ref Range Status   03/13/2025 67 40 - 150 U/L Final     ALP   Date Value Ref Range Status   04/04/2025 73 40 - 150 unit/L Final     AST   Date Value Ref Range Status   04/04/2025 23 11 - 45 unit/L Final   03/13/2025 54 (H) 10 - 40 U/L Final     ALT   Date Value Ref Range Status   04/04/2025 7 (L) 10 - 44 unit/L Final   03/13/2025 28 10 - 44 U/L Final     Anion Gap   Date Value Ref Range Status   04/04/2025 10 8 - 16 mmol/L Final     eGFR   Date Value Ref Range Status   05/05/2025 >60 >60 mL/min/1.73/m2 Final     Comment:     Estimated GFR calculated using the CKD-EPI creatinine (2021) equation.   03/13/2025 >60.0 >60 mL/min/1.73 m^2 Final        Medical history reviewed, Medications reconciled.          JUSTIN Romo  Family Medicine         [1]   Current Outpatient Medications on File Prior to Visit   Medication Sig Dispense Refill    Bifidobacterium infantis (ALIGN, B.INFANTIS, ORAL) Take by mouth.      cholecalciferol, vitamin D3, (VITAMIN D3) 125 mcg (5,000 unit) Tab Take 1 tablet (5,000 Units total) by mouth once daily. 90 tablet 3    famotidine (PEPCID) 20 MG tablet Take 1 tablet (20 mg total) by mouth once daily. 90 tablet 0    fluticasone propionate (FLONASE) 50 mcg/actuation nasal spray 2 sprays (100 mcg total) by Each Nostril route once daily. 48 g 2    loratadine (CLARITIN) 10 mg tablet Take 10 mg by mouth once daily.      polyethylene glycol 3350 (MIRALAX ORAL) Take by mouth.      GAVILYTE-G 236-22.74-6.74 -5.86 gram suspension  (Patient not taking: Reported on 5/30/2025)      ipratropium (ATROVENT) 21 mcg (0.03 %) nasal spray 2 SEN BID and up to QID as needed for runny nose (Patient not taking: Reported on 5/30/2025) 30 mL 12     linaCLOtide (LINZESS) 72 mcg Cap capsule Take 1 capsule (72 mcg total) by mouth before breakfast. (Patient not taking: Reported on 5/30/2025) 30 capsule 2    prednisoLONE-moxiflox-bromfen 1-0.5-0.075 % DrpS Apply 1 drop to eye 3 (three) times daily. (Patient not taking: Reported on 5/30/2025) 8 mL 3    prednisoLONE-moxiflox-bromfen 1-0.5-0.075 % DrpS Apply 1 drop to eye 3 (three) times daily. (Patient not taking: Reported on 5/30/2025) 8 mL 3     Current Facility-Administered Medications on File Prior to Visit   Medication Dose Route Frequency Provider Last Rate Last Admin    electrolyte-S (ISOLYTE)   Intravenous Continuous Torsten Lao MD        lactated ringers infusion   Intravenous Continuous Torsten Lao MD 10 mL/hr at 12/28/22 0930 Restarted at 12/28/22 1040    LIDOcaine (PF) 10 mg/ml (1%) injection 10 mg  1 mL Intradermal Once Torsten Lao MD

## 2025-06-04 ENCOUNTER — TELEPHONE (OUTPATIENT)
Dept: OPHTHALMOLOGY | Facility: CLINIC | Age: 85
End: 2025-06-04
Payer: MEDICARE

## 2025-06-10 ENCOUNTER — TELEPHONE (OUTPATIENT)
Dept: GASTROENTEROLOGY | Facility: CLINIC | Age: 85
End: 2025-06-10
Payer: MEDICARE

## 2025-06-10 NOTE — TELEPHONE ENCOUNTER
Copied from CRM #[id number]. Topic: Appointments - Appointment Rescheduling  >> Otilio 10, 2025 12:00 PM Praveena wrote:  Pt needs to reschedule her 6/12 Colonoscopy. Please adrianne her at [phone]. Thank you

## 2025-06-11 ENCOUNTER — TELEPHONE (OUTPATIENT)
Dept: GASTROENTEROLOGY | Facility: CLINIC | Age: 85
End: 2025-06-11
Payer: MEDICARE

## 2025-06-11 NOTE — TELEPHONE ENCOUNTER
Spoke to pt, rescheduled cscope due to pt not feeling well, pt verbalized understanding of new procedure date.

## 2025-06-17 ENCOUNTER — PATIENT MESSAGE (OUTPATIENT)
Dept: ALLERGY | Facility: CLINIC | Age: 85
End: 2025-06-17
Payer: MEDICARE

## 2025-06-19 ENCOUNTER — TELEPHONE (OUTPATIENT)
Dept: ALLERGY | Facility: CLINIC | Age: 85
End: 2025-06-19
Payer: MEDICARE

## 2025-06-19 DIAGNOSIS — J32.9 RECURRENT SINUS INFECTIONS: Primary | ICD-10-CM

## 2025-06-19 NOTE — TELEPHONE ENCOUNTER
Copied from CRM #[id number]. Topic: General Inquiry - Patient Advice  >> Jun 18, 2025  1:43 PM Angélica wrote:  Type:  Needs Medical Advice    Who Called: pt  Would the patient rather a call back or a response via [facility]? call  Best Call Back Number: [phone]   Additional Information: pt would like to speak about lab work. Pls give a call back

## 2025-06-23 ENCOUNTER — LAB VISIT (OUTPATIENT)
Dept: LAB | Facility: HOSPITAL | Age: 85
End: 2025-06-23
Attending: ALLERGY & IMMUNOLOGY
Payer: MEDICARE

## 2025-06-23 DIAGNOSIS — J32.9 RECURRENT SINUS INFECTIONS: ICD-10-CM

## 2025-06-23 PROCEDURE — 86317 IMMUNOASSAY INFECTIOUS AGENT: CPT

## 2025-06-23 PROCEDURE — 36415 COLL VENOUS BLD VENIPUNCTURE: CPT | Mod: PN

## 2025-08-26 ENCOUNTER — TELEPHONE (OUTPATIENT)
Dept: OPHTHALMOLOGY | Facility: CLINIC | Age: 85
End: 2025-08-26
Payer: MEDICARE

## (undated) DEVICE — SCRUB 10% POVIDONE IODINE 4OZ

## (undated) DEVICE — TUBING SUC UNIV W/CONN 12FT

## (undated) DEVICE — DRAPE HALF SURGICAL 40X58IN